# Patient Record
Sex: FEMALE | Race: WHITE | NOT HISPANIC OR LATINO | Employment: OTHER | URBAN - METROPOLITAN AREA
[De-identification: names, ages, dates, MRNs, and addresses within clinical notes are randomized per-mention and may not be internally consistent; named-entity substitution may affect disease eponyms.]

---

## 2010-05-12 LAB — CRC RECOMMENDATION EXT: NORMAL

## 2017-01-09 ENCOUNTER — HISTORICAL (OUTPATIENT)
Dept: RADIOLOGY | Facility: HOSPITAL | Age: 72
End: 2017-01-09

## 2017-02-13 ENCOUNTER — HISTORICAL (OUTPATIENT)
Dept: LAB | Facility: HOSPITAL | Age: 72
End: 2017-02-13

## 2017-02-22 ENCOUNTER — HISTORICAL (OUTPATIENT)
Dept: LAB | Facility: HOSPITAL | Age: 72
End: 2017-02-22

## 2017-11-28 ENCOUNTER — HISTORICAL (OUTPATIENT)
Dept: LAB | Facility: HOSPITAL | Age: 72
End: 2017-11-28

## 2017-11-28 LAB
ALBUMIN SERPL-MCNC: 3.9 GM/DL (ref 3.4–5)
ALBUMIN/GLOB SERPL: 1.7 RATIO (ref 1.1–2)
ALP SERPL-CCNC: 99 UNIT/L (ref 46–116)
ALT SERPL-CCNC: 48 UNIT/L (ref 12–78)
APPEARANCE, UA: CLEAR
AST SERPL-CCNC: 28 UNIT/L (ref 15–37)
BACTERIA SPEC CULT: ABNORMAL
BILIRUB SERPL-MCNC: 0.8 MG/DL (ref 0.2–1)
BILIRUB UR QL STRIP: NEGATIVE
BILIRUBIN DIRECT+TOT PNL SERPL-MCNC: 0.18 MG/DL (ref 0–0.2)
BILIRUBIN DIRECT+TOT PNL SERPL-MCNC: 0.62 MG/DL (ref 0–0.8)
BUN SERPL-MCNC: 22.3 MG/DL (ref 7–18)
CALCIUM SERPL-MCNC: 9.7 MG/DL (ref 8.5–10.1)
CHLORIDE SERPL-SCNC: 104 MMOL/L (ref 98–107)
CHOLEST SERPL-MCNC: 204 MG/DL (ref 0–200)
CHOLEST/HDLC SERPL: 3.9 {RATIO} (ref 0–4)
CO2 SERPL-SCNC: 29.7 MMOL/L (ref 21–32)
COLOR UR: YELLOW
CREAT SERPL-MCNC: 0.8 MG/DL (ref 0.6–1.3)
CREAT UR-MCNC: 60 MG/DL (ref 30–125)
EST. AVERAGE GLUCOSE BLD GHB EST-MCNC: 123 MG/DL
GLOBULIN SER-MCNC: 2.3 GM/DL (ref 2.4–3.5)
GLUCOSE (UA): NEGATIVE
GLUCOSE SERPL-MCNC: 102 MG/DL (ref 74–106)
HBA1C MFR BLD: 5.9 % (ref 4.5–6.2)
HDLC SERPL-MCNC: 52 MG/DL (ref 40–60)
HGB UR QL STRIP: NEGATIVE
KETONES UR QL STRIP: NEGATIVE
LDLC SERPL CALC-MCNC: 129 MG/DL (ref 0–129)
LEUKOCYTE ESTERASE UR QL STRIP: ABNORMAL
MICROALBUMIN UR-MCNC: 1.4 MG/DL (ref 0–3)
MICROALBUMIN/CREAT RATIO PNL UR: 23.4 MG/GM CR (ref 0–30)
NITRITE UR QL STRIP: NEGATIVE
PH UR STRIP: 7 [PH] (ref 5–9)
POTASSIUM SERPL-SCNC: 4.6 MMOL/L (ref 3.5–5.1)
PROT SERPL-MCNC: 6.2 GM/DL (ref 6.4–8.2)
PROT UR QL STRIP: NEGATIVE
RBC #/AREA URNS HPF: ABNORMAL /[HPF]
SODIUM SERPL-SCNC: 141 MMOL/L (ref 136–145)
SP GR UR STRIP: 1.01 (ref 1–1.03)
SQUAMOUS EPITHELIAL, UA: ABNORMAL
TRIGL SERPL-MCNC: 115 MG/DL
TSH SERPL-ACNC: 1.62 MIU/ML (ref 0.36–3.74)
UROBILINOGEN UR STRIP-ACNC: 0.2
VLDLC SERPL CALC-MCNC: 23 MG/DL
WBC #/AREA URNS HPF: ABNORMAL /HPF

## 2018-04-27 ENCOUNTER — HISTORICAL (OUTPATIENT)
Dept: RADIOLOGY | Facility: HOSPITAL | Age: 73
End: 2018-04-27

## 2018-05-01 ENCOUNTER — HISTORICAL (OUTPATIENT)
Dept: LAB | Facility: HOSPITAL | Age: 73
End: 2018-05-01

## 2018-07-25 ENCOUNTER — HISTORICAL (OUTPATIENT)
Dept: LAB | Facility: HOSPITAL | Age: 73
End: 2018-07-25

## 2018-07-25 LAB
ALBUMIN SERPL-MCNC: 3.8 GM/DL (ref 3.4–5)
ALBUMIN/GLOB SERPL: 1.5 RATIO (ref 1.1–2)
ALP SERPL-CCNC: 96 UNIT/L (ref 46–116)
ALT SERPL-CCNC: 37 UNIT/L (ref 12–78)
AST SERPL-CCNC: 24 UNIT/L (ref 15–37)
BILIRUB SERPL-MCNC: 1.1 MG/DL (ref 0.2–1)
BILIRUBIN DIRECT+TOT PNL SERPL-MCNC: 0.22 MG/DL (ref 0–0.2)
BILIRUBIN DIRECT+TOT PNL SERPL-MCNC: 0.88 MG/DL (ref 0–0.8)
BUN SERPL-MCNC: 15 MG/DL (ref 7–18)
CALCIUM SERPL-MCNC: 9.6 MG/DL (ref 8.5–10.1)
CHLORIDE SERPL-SCNC: 102 MMOL/L (ref 98–107)
CO2 SERPL-SCNC: 28.5 MMOL/L (ref 21–32)
CREAT SERPL-MCNC: 0.65 MG/DL (ref 0.6–1.3)
EST. AVERAGE GLUCOSE BLD GHB EST-MCNC: 126 MG/DL
GLOBULIN SER-MCNC: 2.6 GM/DL (ref 2.4–3.5)
GLUCOSE SERPL-MCNC: 101 MG/DL (ref 74–106)
HBA1C MFR BLD: 6 % (ref 4.5–6.2)
POTASSIUM SERPL-SCNC: 4.8 MMOL/L (ref 3.5–5.1)
PROT SERPL-MCNC: 6.4 GM/DL (ref 6.4–8.2)
SODIUM SERPL-SCNC: 138 MMOL/L (ref 136–145)

## 2018-08-06 ENCOUNTER — HISTORICAL (OUTPATIENT)
Dept: RADIOLOGY | Facility: HOSPITAL | Age: 73
End: 2018-08-06

## 2018-08-29 ENCOUNTER — HISTORICAL (OUTPATIENT)
Dept: RADIOLOGY | Facility: HOSPITAL | Age: 73
End: 2018-08-29

## 2018-10-15 ENCOUNTER — HISTORICAL (OUTPATIENT)
Dept: RADIOLOGY | Facility: HOSPITAL | Age: 73
End: 2018-10-15

## 2018-10-23 ENCOUNTER — HISTORICAL (OUTPATIENT)
Dept: RADIOLOGY | Facility: HOSPITAL | Age: 73
End: 2018-10-23

## 2018-11-19 ENCOUNTER — HISTORICAL (OUTPATIENT)
Dept: LAB | Facility: HOSPITAL | Age: 73
End: 2018-11-19

## 2018-11-19 LAB
ABS NEUT (OLG): 4.05 X10(3)/MCL (ref 2.1–9.2)
ALBUMIN SERPL-MCNC: 4 GM/DL (ref 3.4–5)
ALBUMIN/GLOB SERPL: 1.5 RATIO (ref 1.1–2)
ALP SERPL-CCNC: 104 UNIT/L (ref 46–116)
ALT SERPL-CCNC: 49 UNIT/L (ref 12–78)
AST SERPL-CCNC: 32 UNIT/L (ref 15–37)
BASOPHILS # BLD AUTO: 0.1 X10(3)/MCL (ref 0–0.2)
BASOPHILS NFR BLD AUTO: 1 %
BILIRUB SERPL-MCNC: 0.8 MG/DL (ref 0.2–1)
BILIRUBIN DIRECT+TOT PNL SERPL-MCNC: 0.19 MG/DL (ref 0–0.2)
BILIRUBIN DIRECT+TOT PNL SERPL-MCNC: 0.61 MG/DL (ref 0–0.8)
BUN SERPL-MCNC: 18.7 MG/DL (ref 7–18)
CALCIUM SERPL-MCNC: 10.2 MG/DL (ref 8.5–10.1)
CHLORIDE SERPL-SCNC: 101 MMOL/L (ref 98–107)
CHOLEST SERPL-MCNC: 219 MG/DL (ref 0–200)
CHOLEST/HDLC SERPL: 3.5 {RATIO} (ref 0–4)
CO2 SERPL-SCNC: 32.4 MMOL/L (ref 21–32)
CREAT SERPL-MCNC: 0.65 MG/DL (ref 0.6–1.3)
EOSINOPHIL # BLD AUTO: 0.2 X10(3)/MCL (ref 0–0.9)
EOSINOPHIL NFR BLD AUTO: 3 %
ERYTHROCYTE [DISTWIDTH] IN BLOOD BY AUTOMATED COUNT: 12.9 % (ref 11.5–17)
EST. AVERAGE GLUCOSE BLD GHB EST-MCNC: 123 MG/DL
GLOBULIN SER-MCNC: 2.7 GM/DL (ref 2.4–3.5)
GLUCOSE SERPL-MCNC: 111 MG/DL (ref 74–106)
HBA1C MFR BLD: 5.9 % (ref 4.5–6.2)
HCT VFR BLD AUTO: 41.6 % (ref 37–47)
HDLC SERPL-MCNC: 62 MG/DL (ref 40–60)
HGB BLD-MCNC: 13.5 GM/DL (ref 12–16)
IMM GRANULOCYTES # BLD AUTO: 0.01 % (ref 0–0.02)
IMM GRANULOCYTES NFR BLD AUTO: 0.1 % (ref 0–0.43)
LDLC SERPL CALC-MCNC: 143 MG/DL (ref 0–129)
LYMPHOCYTES # BLD AUTO: 2.9 X10(3)/MCL (ref 0.6–4.6)
LYMPHOCYTES NFR BLD AUTO: 37 %
MCH RBC QN AUTO: 29.3 PG (ref 27–31)
MCHC RBC AUTO-ENTMCNC: 32.5 GM/DL (ref 33–36)
MCV RBC AUTO: 90.4 FL (ref 80–94)
MONOCYTES # BLD AUTO: 0.6 X10(3)/MCL (ref 0.1–1.3)
MONOCYTES NFR BLD AUTO: 7 %
NEUTROPHILS # BLD AUTO: 4.05 X10(3)/MCL (ref 1.4–7.9)
NEUTROPHILS NFR BLD AUTO: 52 %
PLATELET # BLD AUTO: 246 X10(3)/MCL (ref 130–400)
PMV BLD AUTO: 10.4 FL (ref 9.4–12.4)
POTASSIUM SERPL-SCNC: 4.6 MMOL/L (ref 3.5–5.1)
PROT SERPL-MCNC: 6.7 GM/DL (ref 6.4–8.2)
RBC # BLD AUTO: 4.6 X10(6)/MCL (ref 4.2–5.4)
SODIUM SERPL-SCNC: 140 MMOL/L (ref 136–145)
TRIGL SERPL-MCNC: 69 MG/DL
VLDLC SERPL CALC-MCNC: 14 MG/DL
WBC # SPEC AUTO: 7.8 X10(3)/MCL (ref 4.5–11.5)

## 2018-11-20 ENCOUNTER — HISTORICAL (OUTPATIENT)
Dept: LAB | Facility: HOSPITAL | Age: 73
End: 2018-11-20

## 2018-11-20 LAB
APPEARANCE, UA: CLEAR
BACTERIA SPEC CULT: ABNORMAL
BILIRUB UR QL STRIP: NEGATIVE
COLOR UR: YELLOW
GLUCOSE (UA): NEGATIVE
HGB UR QL STRIP: NEGATIVE
KETONES UR QL STRIP: NEGATIVE
LEUKOCYTE ESTERASE UR QL STRIP: ABNORMAL
NITRITE UR QL STRIP: NEGATIVE
PH UR STRIP: 7 [PH] (ref 5–9)
PROT UR QL STRIP: NEGATIVE
RBC #/AREA URNS HPF: ABNORMAL /HPF
SP GR UR STRIP: 1.01 (ref 1–1.03)
SQUAMOUS EPITHELIAL, UA: ABNORMAL
UROBILINOGEN UR STRIP-ACNC: 0.2
WBC #/AREA URNS HPF: ABNORMAL /HPF

## 2018-11-22 LAB — FINAL CULTURE: NO GROWTH

## 2018-12-10 ENCOUNTER — HISTORICAL (OUTPATIENT)
Dept: LAB | Facility: HOSPITAL | Age: 73
End: 2018-12-10

## 2018-12-10 LAB
CALCIUM SERPL-MCNC: 9.6 MG/DL (ref 8.5–10.1)
CREAT UR-MCNC: 51.9 MG/DL (ref 30–125)
MICROALBUMIN UR-MCNC: 0.9 MG/DL (ref 0–3)
MICROALBUMIN/CREAT RATIO PNL UR: 17.3 MG/GM CR (ref 0–30)
PHOSPHATE SERPL-MCNC: 3.1 MG/DL (ref 2.5–4.9)
TSH SERPL-ACNC: 0.6 MIU/ML (ref 0.36–3.74)

## 2019-01-16 ENCOUNTER — HISTORICAL (OUTPATIENT)
Dept: RADIOLOGY | Facility: HOSPITAL | Age: 74
End: 2019-01-16

## 2019-01-30 ENCOUNTER — HISTORICAL (OUTPATIENT)
Dept: RADIOLOGY | Facility: HOSPITAL | Age: 74
End: 2019-01-30

## 2019-01-30 LAB
ABS NEUT (OLG): 3.8 X10(3)/MCL (ref 2.1–9.2)
BASOPHILS # BLD AUTO: 0 X10(3)/MCL (ref 0–0.2)
BASOPHILS NFR BLD AUTO: 0 %
BUN SERPL-MCNC: 21.6 MG/DL (ref 7–18)
CALCIUM SERPL-MCNC: 10.1 MG/DL (ref 8.5–10.1)
CHLORIDE SERPL-SCNC: 102 MMOL/L (ref 98–107)
CO2 SERPL-SCNC: 33.7 MMOL/L (ref 21–32)
CREAT SERPL-MCNC: 0.68 MG/DL (ref 0.6–1.3)
CREAT/UREA NIT SERPL: 32
EOSINOPHIL # BLD AUTO: 0.1 X10(3)/MCL (ref 0–0.9)
EOSINOPHIL NFR BLD AUTO: 2 %
ERYTHROCYTE [DISTWIDTH] IN BLOOD BY AUTOMATED COUNT: 13 % (ref 11.5–17)
GLUCOSE SERPL-MCNC: 112 MG/DL (ref 74–106)
HCT VFR BLD AUTO: 42.9 % (ref 37–47)
HGB BLD-MCNC: 13.8 GM/DL (ref 12–16)
LYMPHOCYTES # BLD AUTO: 2.2 X10(3)/MCL (ref 0.6–4.6)
LYMPHOCYTES NFR BLD AUTO: 33 %
MCH RBC QN AUTO: 28.9 PG (ref 27–31)
MCHC RBC AUTO-ENTMCNC: 32.2 GM/DL (ref 33–36)
MCV RBC AUTO: 89.9 FL (ref 80–94)
MONOCYTES # BLD AUTO: 0.5 X10(3)/MCL (ref 0.1–1.3)
MONOCYTES NFR BLD AUTO: 8 %
NEUTROPHILS # BLD AUTO: 3.8 X10(3)/MCL (ref 1.4–7.9)
NEUTROPHILS NFR BLD AUTO: 57 %
PLATELET # BLD AUTO: 263 X10(3)/MCL (ref 130–400)
PMV BLD AUTO: 10.2 FL (ref 9.4–12.4)
POTASSIUM SERPL-SCNC: 4.5 MMOL/L (ref 3.5–5.1)
RBC # BLD AUTO: 4.77 X10(6)/MCL (ref 4.2–5.4)
SODIUM SERPL-SCNC: 140 MMOL/L (ref 136–145)
WBC # SPEC AUTO: 6.7 X10(3)/MCL (ref 4.5–11.5)

## 2019-02-01 ENCOUNTER — HISTORICAL (OUTPATIENT)
Dept: SURGERY | Facility: HOSPITAL | Age: 74
End: 2019-02-01

## 2019-02-20 ENCOUNTER — HISTORICAL (OUTPATIENT)
Dept: RADIOLOGY | Facility: HOSPITAL | Age: 74
End: 2019-02-20

## 2019-02-26 ENCOUNTER — HISTORICAL (OUTPATIENT)
Dept: LAB | Facility: HOSPITAL | Age: 74
End: 2019-02-26

## 2019-02-26 LAB
EST. AVERAGE GLUCOSE BLD GHB EST-MCNC: 117 MG/DL
HBA1C MFR BLD: 5.7 % (ref 4.5–6.2)

## 2019-03-13 ENCOUNTER — HISTORICAL (OUTPATIENT)
Dept: RADIOLOGY | Facility: HOSPITAL | Age: 74
End: 2019-03-13

## 2019-04-24 ENCOUNTER — HISTORICAL (OUTPATIENT)
Dept: RADIOLOGY | Facility: HOSPITAL | Age: 74
End: 2019-04-24

## 2019-08-21 ENCOUNTER — HISTORICAL (OUTPATIENT)
Dept: LAB | Facility: HOSPITAL | Age: 74
End: 2019-08-21

## 2019-08-21 LAB
CREAT UR-MCNC: 38.2 MG/DL (ref 30–125)
MICROALBUMIN UR-MCNC: 0.6 MG/DL (ref 0–3)
MICROALBUMIN/CREAT RATIO PNL UR: 15.7 MG/GM CR (ref 0–30)

## 2019-09-30 ENCOUNTER — HISTORICAL (OUTPATIENT)
Dept: LAB | Facility: HOSPITAL | Age: 74
End: 2019-09-30

## 2019-09-30 LAB
ABS NEUT (OLG): 3.1 X10(3)/MCL (ref 2.1–9.2)
ALBUMIN SERPL-MCNC: 3.9 GM/DL (ref 3.4–5)
ALBUMIN SERPL-MCNC: 3.9 GM/DL (ref 3.4–5)
ALBUMIN/GLOB SERPL: 1.6 RATIO (ref 1.1–2)
ALBUMIN/GLOB SERPL: 1.6 RATIO (ref 1.1–2)
ALP SERPL-CCNC: 107 UNIT/L (ref 46–116)
ALP SERPL-CCNC: 107 UNIT/L (ref 46–116)
ALT SERPL-CCNC: 49 UNIT/L (ref 12–78)
ALT SERPL-CCNC: 49 UNIT/L (ref 12–78)
APPEARANCE, UA: ABNORMAL
APPEARANCE, UA: CLEAR
AST SERPL-CCNC: 36 UNIT/L (ref 15–37)
AST SERPL-CCNC: 36 UNIT/L (ref 15–37)
BACTERIA SPEC CULT: ABNORMAL
BACTERIA SPEC CULT: ABNORMAL
BASOPHILS # BLD AUTO: 0 X10(3)/MCL (ref 0–0.2)
BASOPHILS NFR BLD AUTO: 0 %
BILIRUB SERPL-MCNC: 0.8 MG/DL (ref 0.2–1)
BILIRUB SERPL-MCNC: 0.9 MG/DL (ref 0.2–1)
BILIRUB UR QL STRIP: NEGATIVE
BILIRUB UR QL STRIP: NEGATIVE
BILIRUBIN DIRECT+TOT PNL SERPL-MCNC: 0.2 MG/DL (ref 0–0.2)
BILIRUBIN DIRECT+TOT PNL SERPL-MCNC: 0.21 MG/DL (ref 0–0.2)
BILIRUBIN DIRECT+TOT PNL SERPL-MCNC: 0.6 MG/DL (ref 0–0.8)
BILIRUBIN DIRECT+TOT PNL SERPL-MCNC: 0.69 MG/DL (ref 0–0.8)
BUN SERPL-MCNC: 19.3 MG/DL (ref 7–18)
BUN SERPL-MCNC: 19.3 MG/DL (ref 7–18)
CALCIUM SERPL-MCNC: 10 MG/DL (ref 8.5–10.1)
CALCIUM SERPL-MCNC: 10 MG/DL (ref 8.5–10.1)
CHLORIDE SERPL-SCNC: 105 MMOL/L (ref 98–107)
CHLORIDE SERPL-SCNC: 105 MMOL/L (ref 98–107)
CHOLEST SERPL-MCNC: 201 MG/DL (ref 0–200)
CHOLEST SERPL-MCNC: 201 MG/DL (ref 0–200)
CHOLEST/HDLC SERPL: 3.5 {RATIO} (ref 0–4)
CHOLEST/HDLC SERPL: 3.6 {RATIO} (ref 0–4)
CO2 SERPL-SCNC: 31.5 MMOL/L (ref 21–32)
CO2 SERPL-SCNC: 31.5 MMOL/L (ref 21–32)
COLOR UR: YELLOW
COLOR UR: YELLOW
CREAT SERPL-MCNC: 0.5 MG/DL (ref 0.6–1.3)
CREAT SERPL-MCNC: 0.58 MG/DL (ref 0.6–1.3)
DEPRECATED CALCIDIOL+CALCIFEROL SERPL-MC: 45.23 NG/DL (ref 30–80)
EOSINOPHIL # BLD AUTO: 0.1 X10(3)/MCL (ref 0–0.9)
EOSINOPHIL NFR BLD AUTO: 2 %
ERYTHROCYTE [DISTWIDTH] IN BLOOD BY AUTOMATED COUNT: 13 % (ref 11.5–17)
EST. AVERAGE GLUCOSE BLD GHB EST-MCNC: 120 MG/DL
EST. AVERAGE GLUCOSE BLD GHB EST-MCNC: 120 MG/DL
GLOBULIN SER-MCNC: 2.5 GM/DL (ref 2.4–3.5)
GLOBULIN SER-MCNC: 2.5 GM/DL (ref 2.4–3.5)
GLUCOSE (UA): NEGATIVE
GLUCOSE (UA): NEGATIVE
GLUCOSE SERPL-MCNC: 106 MG/DL (ref 74–106)
GLUCOSE SERPL-MCNC: 106 MG/DL (ref 74–106)
HBA1C MFR BLD: 5.8 % (ref 4.5–6.2)
HBA1C MFR BLD: 5.8 % (ref 4.5–6.2)
HCT VFR BLD AUTO: 40.5 % (ref 37–47)
HDLC SERPL-MCNC: 56 MG/DL (ref 40–60)
HDLC SERPL-MCNC: 57 MG/DL (ref 40–60)
HGB BLD-MCNC: 13 GM/DL (ref 12–16)
HGB UR QL STRIP: NEGATIVE
HGB UR QL STRIP: NEGATIVE
KETONES UR QL STRIP: NEGATIVE
KETONES UR QL STRIP: NEGATIVE
LDLC SERPL CALC-MCNC: 132 MG/DL (ref 0–129)
LDLC SERPL CALC-MCNC: 133 MG/DL (ref 0–129)
LEUKOCYTE ESTERASE UR QL STRIP: NEGATIVE
LEUKOCYTE ESTERASE UR QL STRIP: NEGATIVE
LYMPHOCYTES # BLD AUTO: 2.6 X10(3)/MCL (ref 0.6–4.6)
LYMPHOCYTES NFR BLD AUTO: 41 %
MCH RBC QN AUTO: 29.2 PG (ref 27–31)
MCHC RBC AUTO-ENTMCNC: 32.1 GM/DL (ref 33–36)
MCV RBC AUTO: 91 FL (ref 80–94)
MONOCYTES # BLD AUTO: 0.4 X10(3)/MCL (ref 0.1–1.3)
MONOCYTES NFR BLD AUTO: 7 %
NEUTROPHILS # BLD AUTO: 3.1 X10(3)/MCL (ref 1.4–7.9)
NEUTROPHILS NFR BLD AUTO: 48 %
NITRITE UR QL STRIP: NEGATIVE
NITRITE UR QL STRIP: NEGATIVE
PH UR STRIP: 6 [PH] (ref 5–9)
PH UR STRIP: 6 [PH] (ref 5–9)
PLATELET # BLD AUTO: 216 X10(3)/MCL (ref 130–400)
PMV BLD AUTO: 10 FL (ref 9.4–12.4)
POTASSIUM SERPL-SCNC: 4.2 MMOL/L (ref 3.5–5.1)
POTASSIUM SERPL-SCNC: 4.3 MMOL/L (ref 3.5–5.1)
PROT SERPL-MCNC: 6.4 GM/DL (ref 6.4–8.2)
PROT SERPL-MCNC: 6.4 GM/DL (ref 6.4–8.2)
PROT UR QL STRIP: NEGATIVE
PROT UR QL STRIP: NEGATIVE
RBC # BLD AUTO: 4.45 X10(6)/MCL (ref 4.2–5.4)
RBC #/AREA URNS HPF: ABNORMAL /[HPF]
RBC #/AREA URNS HPF: ABNORMAL /[HPF]
SODIUM SERPL-SCNC: 142 MMOL/L (ref 136–145)
SODIUM SERPL-SCNC: 142 MMOL/L (ref 136–145)
SP GR UR STRIP: 1.02 (ref 1–1.03)
SP GR UR STRIP: 1.02 (ref 1–1.03)
SQUAMOUS EPITHELIAL, UA: ABNORMAL
SQUAMOUS EPITHELIAL, UA: ABNORMAL
TRIGL SERPL-MCNC: 62 MG/DL
TRIGL SERPL-MCNC: 62 MG/DL
TSH SERPL-ACNC: 1.3 MIU/ML (ref 0.36–3.74)
UROBILINOGEN UR STRIP-ACNC: 1
UROBILINOGEN UR STRIP-ACNC: 1
VLDLC SERPL CALC-MCNC: 12 MG/DL
VLDLC SERPL CALC-MCNC: 12 MG/DL
WBC # SPEC AUTO: 6.5 X10(3)/MCL (ref 4.5–11.5)
WBC #/AREA URNS HPF: ABNORMAL /[HPF]
WBC #/AREA URNS HPF: ABNORMAL /[HPF]

## 2020-03-05 ENCOUNTER — HISTORICAL (OUTPATIENT)
Dept: RADIOLOGY | Facility: HOSPITAL | Age: 75
End: 2020-03-05

## 2020-05-04 ENCOUNTER — HISTORICAL (OUTPATIENT)
Dept: LAB | Facility: HOSPITAL | Age: 75
End: 2020-05-04

## 2020-05-04 LAB
ALBUMIN SERPL-MCNC: 3.8 GM/DL (ref 3.4–5)
ALBUMIN/GLOB SERPL: 1.7 RATIO (ref 1.1–2)
ALP SERPL-CCNC: 95 UNIT/L (ref 46–116)
ALT SERPL-CCNC: 60 UNIT/L (ref 12–78)
APPEARANCE, UA: CLEAR
AST SERPL-CCNC: 39 UNIT/L (ref 15–37)
BACTERIA SPEC CULT: ABNORMAL
BILIRUB SERPL-MCNC: 1 MG/DL (ref 0.2–1)
BILIRUB UR QL STRIP: NEGATIVE
BILIRUBIN DIRECT+TOT PNL SERPL-MCNC: 0.27 MG/DL (ref 0–0.2)
BILIRUBIN DIRECT+TOT PNL SERPL-MCNC: 0.73 MG/DL (ref 0–0.8)
BUN SERPL-MCNC: 19 MG/DL (ref 7–18)
CALCIUM SERPL-MCNC: 9.5 MG/DL (ref 8.5–10.1)
CHLORIDE SERPL-SCNC: 104 MMOL/L (ref 98–107)
CHOLEST SERPL-MCNC: 215 MG/DL (ref 0–200)
CHOLEST/HDLC SERPL: 3.5 {RATIO} (ref 0–4)
CO2 SERPL-SCNC: 28.7 MMOL/L (ref 21–32)
COLOR UR: YELLOW
CREAT SERPL-MCNC: 0.71 MG/DL (ref 0.6–1.3)
EST. AVERAGE GLUCOSE BLD GHB EST-MCNC: 114 MG/DL
GLOBULIN SER-MCNC: 2.3 GM/DL (ref 2.4–3.5)
GLUCOSE (UA): NEGATIVE
GLUCOSE SERPL-MCNC: 107 MG/DL (ref 74–106)
HBA1C MFR BLD: 5.6 % (ref 4.5–6.2)
HDLC SERPL-MCNC: 62 MG/DL (ref 40–60)
HGB UR QL STRIP: NEGATIVE
KETONES UR QL STRIP: NEGATIVE
LDLC SERPL CALC-MCNC: 139 MG/DL (ref 0–129)
LEUKOCYTE ESTERASE UR QL STRIP: ABNORMAL
NITRITE UR QL STRIP: NEGATIVE
PH UR STRIP: 7 [PH] (ref 5–9)
POTASSIUM SERPL-SCNC: 4.1 MMOL/L (ref 3.5–5.1)
PROT SERPL-MCNC: 6.1 GM/DL (ref 6.4–8.2)
PROT UR QL STRIP: NEGATIVE
RBC #/AREA URNS HPF: ABNORMAL /[HPF]
SODIUM SERPL-SCNC: 139 MMOL/L (ref 136–145)
SP GR UR STRIP: 1.01 (ref 1–1.03)
SQUAMOUS EPITHELIAL, UA: ABNORMAL
TRIGL SERPL-MCNC: 68 MG/DL
UROBILINOGEN UR STRIP-ACNC: 0.2
VLDLC SERPL CALC-MCNC: 14 MG/DL
WBC #/AREA URNS HPF: ABNORMAL /HPF

## 2020-06-12 ENCOUNTER — HISTORICAL (OUTPATIENT)
Dept: RADIOLOGY | Facility: HOSPITAL | Age: 75
End: 2020-06-12

## 2020-07-16 ENCOUNTER — HISTORICAL (OUTPATIENT)
Dept: RADIOLOGY | Facility: HOSPITAL | Age: 75
End: 2020-07-16

## 2020-11-02 ENCOUNTER — HISTORICAL (OUTPATIENT)
Dept: LAB | Facility: HOSPITAL | Age: 75
End: 2020-11-02

## 2020-11-02 ENCOUNTER — HISTORICAL (OUTPATIENT)
Dept: INFUSION THERAPY | Facility: HOSPITAL | Age: 75
End: 2020-11-02

## 2020-11-02 LAB
ABS NEUT (OLG): 4.27 X10(3)/MCL (ref 2.1–9.2)
ALBUMIN SERPL-MCNC: 4.13 GM/DL (ref 3.4–5)
ALBUMIN/GLOB SERPL: 1.6 RATIO (ref 1.1–2)
ALP SERPL-CCNC: 111 UNIT/L (ref 46–116)
ALT SERPL-CCNC: 56 UNIT/L (ref 12–78)
APPEARANCE, UA: ABNORMAL
AST SERPL-CCNC: 38 UNIT/L (ref 15–37)
BACTERIA SPEC CULT: ABNORMAL
BASOPHILS # BLD AUTO: 0 X10(3)/MCL (ref 0–0.2)
BASOPHILS NFR BLD AUTO: 0 %
BILIRUB SERPL-MCNC: 0.7 MG/DL (ref 0.2–1)
BILIRUB UR QL STRIP: NEGATIVE
BILIRUBIN DIRECT+TOT PNL SERPL-MCNC: 0.2 MG/DL (ref 0–0.2)
BILIRUBIN DIRECT+TOT PNL SERPL-MCNC: 0.5 MG/DL (ref 0–0.8)
BUN SERPL-MCNC: 20.5 MG/DL (ref 7–18)
CALCIUM SERPL-MCNC: 9.9 MG/DL (ref 8.5–10.1)
CHLORIDE SERPL-SCNC: 105 MMOL/L (ref 98–107)
CHOLEST SERPL-MCNC: 227 MG/DL (ref 0–200)
CHOLEST/HDLC SERPL: 3.5 {RATIO} (ref 0–4)
CO2 SERPL-SCNC: 30.1 MMOL/L (ref 21–32)
COLOR UR: YELLOW
CREAT SERPL-MCNC: 0.76 MG/DL (ref 0.6–1.3)
CREAT UR-MCNC: 64.2 MG/DL (ref 45–106)
DEPRECATED CALCIDIOL+CALCIFEROL SERPL-MC: 53.5 NG/ML (ref 6.6–49.9)
EOSINOPHIL # BLD AUTO: 0.1 X10(3)/MCL (ref 0–0.9)
EOSINOPHIL NFR BLD AUTO: 2 %
ERYTHROCYTE [DISTWIDTH] IN BLOOD BY AUTOMATED COUNT: 12.8 % (ref 11.5–17)
EST. AVERAGE GLUCOSE BLD GHB EST-MCNC: 123 MG/DL
GLOBULIN SER-MCNC: 2.57 GM/DL (ref 2.4–3.5)
GLUCOSE (UA): NEGATIVE
GLUCOSE SERPL-MCNC: 134 MG/DL (ref 74–106)
HBA1C MFR BLD: 5.9 % (ref 4.5–6.2)
HCT VFR BLD AUTO: 44.1 % (ref 37–47)
HDLC SERPL-MCNC: 65 MG/DL (ref 40–60)
HGB BLD-MCNC: 13.8 GM/DL (ref 12–16)
HGB UR QL STRIP: NEGATIVE
IMM GRANULOCYTES # BLD AUTO: 0.02 % (ref 0–0.02)
IMM GRANULOCYTES NFR BLD AUTO: 0.3 % (ref 0–0.43)
KETONES UR QL STRIP: NEGATIVE
LDLC SERPL CALC-MCNC: 147 MG/DL (ref 0–129)
LEUKOCYTE ESTERASE UR QL STRIP: ABNORMAL
LYMPHOCYTES # BLD AUTO: 2.7 X10(3)/MCL (ref 0.6–4.6)
LYMPHOCYTES NFR BLD AUTO: 35 %
MCH RBC QN AUTO: 29.4 PG (ref 27–31)
MCHC RBC AUTO-ENTMCNC: 31.3 GM/DL (ref 33–36)
MCV RBC AUTO: 93.8 FL (ref 80–94)
MICROALBUMIN UR-MCNC: 6.5 UG/ML
MICROALBUMIN/CREAT RATIO PNL UR: 10.1 MG/GM CR (ref 0–30)
MONOCYTES # BLD AUTO: 0.5 X10(3)/MCL (ref 0.1–1.3)
MONOCYTES NFR BLD AUTO: 6 %
NEUTROPHILS # BLD AUTO: 4.27 X10(3)/MCL (ref 1.4–7.9)
NEUTROPHILS NFR BLD AUTO: 56 %
NITRITE UR QL STRIP: NEGATIVE
PH UR STRIP: 8.5 [PH] (ref 5–9)
PLATELET # BLD AUTO: 231 X10(3)/MCL (ref 130–400)
PMV BLD AUTO: 9.6 FL (ref 9.4–12.4)
POTASSIUM SERPL-SCNC: 4.2 MMOL/L (ref 3.5–5.1)
PROT SERPL-MCNC: 6.7 GM/DL (ref 6.4–8.2)
PROT UR QL STRIP: NEGATIVE
RBC # BLD AUTO: 4.7 X10(6)/MCL (ref 4.2–5.4)
RBC #/AREA URNS HPF: ABNORMAL /[HPF]
SODIUM SERPL-SCNC: 142 MMOL/L (ref 136–145)
SP GR UR STRIP: 1.02 (ref 1–1.03)
SQUAMOUS EPITHELIAL, UA: ABNORMAL
TRIGL SERPL-MCNC: 76 MG/DL
TSH SERPL-ACNC: 1.95 MIU/ML (ref 0.36–3.74)
UROBILINOGEN UR STRIP-ACNC: 1
VLDLC SERPL CALC-MCNC: 15 MG/DL
WBC # SPEC AUTO: 7.6 X10(3)/MCL (ref 4.5–11.5)
WBC #/AREA URNS HPF: ABNORMAL /HPF

## 2021-04-27 ENCOUNTER — HISTORICAL (OUTPATIENT)
Dept: RADIOLOGY | Facility: HOSPITAL | Age: 76
End: 2021-04-27

## 2021-06-03 ENCOUNTER — HISTORICAL (OUTPATIENT)
Dept: LAB | Facility: HOSPITAL | Age: 76
End: 2021-06-03

## 2021-06-03 LAB
ABS NEUT (OLG): 3.59 X10(3)/MCL (ref 2.1–9.2)
ALBUMIN SERPL-MCNC: 3.8 GM/DL (ref 3.4–4.8)
ALBUMIN/GLOB SERPL: 1.5 RATIO (ref 1.1–2)
ALP SERPL-CCNC: 101 UNIT/L (ref 40–150)
ALT SERPL-CCNC: 46 UNIT/L (ref 0–55)
AST SERPL-CCNC: 38 UNIT/L (ref 5–34)
BASOPHILS # BLD AUTO: 0 X10(3)/MCL (ref 0–0.2)
BASOPHILS NFR BLD AUTO: 0 %
BILIRUB SERPL-MCNC: 1.3 MG/DL
BILIRUBIN DIRECT+TOT PNL SERPL-MCNC: 0.4 MG/DL (ref 0–0.5)
BILIRUBIN DIRECT+TOT PNL SERPL-MCNC: 0.9 MG/DL (ref 0–0.8)
BUN SERPL-MCNC: 16.9 MG/DL (ref 9.8–20.1)
CALCIUM SERPL-MCNC: 9.7 MG/DL (ref 8.4–10.2)
CHLORIDE SERPL-SCNC: 104 MMOL/L (ref 98–107)
CHOLEST SERPL-MCNC: 189 MG/DL
CHOLEST/HDLC SERPL: 4 {RATIO} (ref 0–5)
CO2 SERPL-SCNC: 29 MMOL/L (ref 23–31)
CREAT SERPL-MCNC: 0.68 MG/DL (ref 0.55–1.02)
EOSINOPHIL # BLD AUTO: 0.2 X10(3)/MCL (ref 0–0.9)
EOSINOPHIL NFR BLD AUTO: 3 %
ERYTHROCYTE [DISTWIDTH] IN BLOOD BY AUTOMATED COUNT: 13.2 % (ref 11.5–17)
EST. AVERAGE GLUCOSE BLD GHB EST-MCNC: 114 MG/DL
GLOBULIN SER-MCNC: 2.5 GM/DL (ref 2.4–3.5)
GLUCOSE SERPL-MCNC: 109 MG/DL (ref 82–115)
HBA1C MFR BLD: 5.6 %
HCT VFR BLD AUTO: 42 % (ref 37–47)
HDLC SERPL-MCNC: 49 MG/DL (ref 35–60)
HGB BLD-MCNC: 13.2 GM/DL (ref 12–16)
IMM GRANULOCYTES # BLD AUTO: 0.01 % (ref 0–0.02)
IMM GRANULOCYTES NFR BLD AUTO: 0.1 % (ref 0–0.43)
LDLC SERPL CALC-MCNC: 123 MG/DL (ref 50–140)
LYMPHOCYTES # BLD AUTO: 2.4 X10(3)/MCL (ref 0.6–4.6)
LYMPHOCYTES NFR BLD AUTO: 36 %
MCH RBC QN AUTO: 28.8 PG (ref 27–31)
MCHC RBC AUTO-ENTMCNC: 31.4 GM/DL (ref 33–36)
MCV RBC AUTO: 91.5 FL (ref 80–94)
MONOCYTES # BLD AUTO: 0.5 X10(3)/MCL (ref 0.1–1.3)
MONOCYTES NFR BLD AUTO: 7 %
NEUTROPHILS # BLD AUTO: 3.59 X10(3)/MCL (ref 1.4–7.9)
NEUTROPHILS NFR BLD AUTO: 54 %
PLATELET # BLD AUTO: 208 X10(3)/MCL (ref 130–400)
PMV BLD AUTO: 9.8 FL (ref 9.4–12.4)
POTASSIUM SERPL-SCNC: 4.3 MMOL/L (ref 3.5–5.1)
PROT SERPL-MCNC: 6.3 GM/DL (ref 5.8–7.6)
RBC # BLD AUTO: 4.59 X10(6)/MCL (ref 4.2–5.4)
SODIUM SERPL-SCNC: 140 MMOL/L (ref 136–145)
TRIGL SERPL-MCNC: 86 MG/DL (ref 37–140)
VLDLC SERPL CALC-MCNC: 17 MG/DL
WBC # SPEC AUTO: 6.7 X10(3)/MCL (ref 4.5–11.5)

## 2021-09-30 ENCOUNTER — HISTORICAL (OUTPATIENT)
Dept: LAB | Facility: HOSPITAL | Age: 76
End: 2021-09-30

## 2021-09-30 LAB
ABS NEUT (OLG): 4.21 X10(3)/MCL (ref 2.1–9.2)
BASOPHILS # BLD AUTO: 0 X10(3)/MCL (ref 0–0.2)
BASOPHILS NFR BLD AUTO: 0 %
BUN SERPL-MCNC: 15.2 MG/DL (ref 9.8–20.1)
CALCIUM SERPL-MCNC: 10.1 MG/DL (ref 8.4–10.2)
CHLORIDE SERPL-SCNC: 104 MMOL/L (ref 98–107)
CO2 SERPL-SCNC: 26 MMOL/L (ref 23–31)
CREAT SERPL-MCNC: 0.68 MG/DL (ref 0.55–1.02)
CREAT/UREA NIT SERPL: 22
EOSINOPHIL # BLD AUTO: 0.2 X10(3)/MCL (ref 0–0.9)
EOSINOPHIL NFR BLD AUTO: 2 %
ERYTHROCYTE [DISTWIDTH] IN BLOOD BY AUTOMATED COUNT: 13.6 % (ref 11.5–17)
GLUCOSE SERPL-MCNC: 109 MG/DL (ref 82–115)
HCT VFR BLD AUTO: 37.5 % (ref 37–47)
HGB BLD-MCNC: 11.9 GM/DL (ref 12–16)
IMM GRANULOCYTES # BLD AUTO: 0.01 % (ref 0–0.02)
IMM GRANULOCYTES NFR BLD AUTO: 0.1 % (ref 0–0.43)
LYMPHOCYTES # BLD AUTO: 2.5 X10(3)/MCL (ref 0.6–4.6)
LYMPHOCYTES NFR BLD AUTO: 33 %
MCH RBC QN AUTO: 29.2 PG (ref 27–31)
MCHC RBC AUTO-ENTMCNC: 31.7 GM/DL (ref 33–36)
MCV RBC AUTO: 91.9 FL (ref 80–94)
MONOCYTES # BLD AUTO: 0.6 X10(3)/MCL (ref 0.1–1.3)
MONOCYTES NFR BLD AUTO: 8 %
NEUTROPHILS # BLD AUTO: 4.21 X10(3)/MCL (ref 1.4–7.9)
NEUTROPHILS NFR BLD AUTO: 56 %
PLATELET # BLD AUTO: 226 X10(3)/MCL (ref 130–400)
PMV BLD AUTO: 9.7 FL (ref 9.4–12.4)
POTASSIUM SERPL-SCNC: 3.9 MMOL/L (ref 3.5–5.1)
RBC # BLD AUTO: 4.08 X10(6)/MCL (ref 4.2–5.4)
SARS-COV-2 AG RESP QL IA.RAPID: NEGATIVE
SODIUM SERPL-SCNC: 140 MMOL/L (ref 136–145)
WBC # SPEC AUTO: 7.5 X10(3)/MCL (ref 4.5–11.5)

## 2021-10-01 ENCOUNTER — HISTORICAL (OUTPATIENT)
Dept: SURGERY | Facility: HOSPITAL | Age: 76
End: 2021-10-01

## 2021-10-13 ENCOUNTER — HISTORICAL (OUTPATIENT)
Dept: RADIOLOGY | Facility: HOSPITAL | Age: 76
End: 2021-10-13

## 2021-10-20 ENCOUNTER — HISTORICAL (OUTPATIENT)
Dept: RADIOLOGY | Facility: HOSPITAL | Age: 76
End: 2021-10-20

## 2021-11-10 ENCOUNTER — HISTORICAL (OUTPATIENT)
Dept: RADIOLOGY | Facility: HOSPITAL | Age: 76
End: 2021-11-10

## 2021-12-01 ENCOUNTER — HISTORICAL (OUTPATIENT)
Dept: RADIOLOGY | Facility: HOSPITAL | Age: 76
End: 2021-12-01

## 2022-01-11 ENCOUNTER — HISTORICAL (OUTPATIENT)
Dept: RADIOLOGY | Facility: HOSPITAL | Age: 77
End: 2022-01-11

## 2022-02-17 ENCOUNTER — HISTORICAL (OUTPATIENT)
Dept: ADMINISTRATIVE | Facility: HOSPITAL | Age: 77
End: 2022-02-17

## 2022-02-17 LAB
BUN SERPL-MCNC: 17.2 MG/DL (ref 9.8–20.1)
CALCIUM SERPL-MCNC: 10.2 MG/DL (ref 8.7–10.5)
CHLORIDE SERPL-SCNC: 102 MMOL/L (ref 98–107)
CO2 SERPL-SCNC: 25 MMOL/L (ref 23–31)
CREAT SERPL-MCNC: 0.61 MG/DL (ref 0.55–1.02)
CREAT/UREA NIT SERPL: 28
EST. AVERAGE GLUCOSE BLD GHB EST-MCNC: 114 MG/DL
GLUCOSE SERPL-MCNC: 101 MG/DL (ref 82–115)
HBA1C MFR BLD: 5.6 %
HEMOLYSIS INTERF INDEX SERPL-ACNC: 16
ICTERIC INTERF INDEX SERPL-ACNC: 1
LIPEMIC INTERF INDEX SERPL-ACNC: 3
POTASSIUM SERPL-SCNC: 4.2 MMOL/L (ref 3.5–5.1)
SODIUM SERPL-SCNC: 137 MMOL/L (ref 136–145)
TSH SERPL-ACNC: 2.36 M[IU]/L (ref 0.35–4.94)

## 2022-03-03 ENCOUNTER — HISTORICAL (OUTPATIENT)
Dept: LAB | Facility: HOSPITAL | Age: 77
End: 2022-03-03

## 2022-03-03 ENCOUNTER — HISTORICAL (OUTPATIENT)
Dept: ADMINISTRATIVE | Facility: HOSPITAL | Age: 77
End: 2022-03-03

## 2022-03-03 LAB
ABS NEUT (OLG): 9.62 (ref 2.1–9.2)
BASOPHILS # BLD AUTO: 0 10*3/UL (ref 0–0.2)
BASOPHILS NFR BLD AUTO: 0 %
CK SERPL-CCNC: 111 U/L (ref 29–168)
CRP SERPL-MCNC: 0 MG/L (ref 0–1)
ERYTHROCYTE [DISTWIDTH] IN BLOOD BY AUTOMATED COUNT: 13.7 % (ref 11.5–17)
ERYTHROCYTE [SEDIMENTATION RATE] IN BLOOD: 10 MM/H (ref 0–20)
FOLATE SERPL-MCNC: 16.6 NG/ML (ref 7–31.4)
HCT VFR BLD AUTO: 40.4 % (ref 37–47)
HGB BLD-MCNC: 12.5 G/DL (ref 12–16)
IMM GRANULOCYTES # BLD AUTO: 0.02 10*3/UL (ref 0–0.02)
IMM GRANULOCYTES NFR BLD AUTO: 0.2 % (ref 0–0.43)
LYMPHOCYTES # BLD AUTO: 2.4 10*3/UL (ref 0.6–4.6)
LYMPHOCYTES NFR BLD AUTO: 18 %
MANUAL DIFF? (OHS): NO
MCH RBC QN AUTO: 27.5 PG (ref 27–31)
MCHC RBC AUTO-ENTMCNC: 30.9 G/DL (ref 33–36)
MCV RBC AUTO: 89 FL (ref 80–94)
MONOCYTES # BLD AUTO: 0.9 10*3/UL (ref 0.1–1.3)
MONOCYTES NFR BLD AUTO: 7 %
NEUTROPHILS # BLD AUTO: 9.62 10*3/UL (ref 1.4–7.9)
NEUTROPHILS NFR BLD AUTO: 74 %
PLATELET # BLD AUTO: 287 10*3/UL (ref 130–400)
PMV BLD AUTO: 9.6 FL (ref 9.4–12.4)
RBC # BLD AUTO: 4.54 10*6/UL (ref 4.2–5.4)
T4 FREE SERPL-MCNC: 1.07 NG/DL (ref 0.7–1.48)
VIT B12 SERPL-MCNC: 540 PG/ML (ref 213–816)
WBC # SPEC AUTO: 12.9 10*3/UL (ref 4.5–11.5)

## 2022-03-07 LAB
ANTINUCLEAR ANTIBODY SCREEN (OHS): NEGATIVE
DSDNA AB QUANT (OHS): 2
DSDNA ANTIBODY (OHS): NEGATIVE

## 2022-03-17 ENCOUNTER — HISTORICAL (OUTPATIENT)
Dept: RADIOLOGY | Facility: HOSPITAL | Age: 77
End: 2022-03-17

## 2022-03-17 ENCOUNTER — HISTORICAL (OUTPATIENT)
Dept: ADMINISTRATIVE | Facility: HOSPITAL | Age: 77
End: 2022-03-17

## 2022-04-10 ENCOUNTER — HISTORICAL (OUTPATIENT)
Dept: ADMINISTRATIVE | Facility: HOSPITAL | Age: 77
End: 2022-04-10
Payer: MEDICARE

## 2022-04-26 VITALS
HEIGHT: 66 IN | DIASTOLIC BLOOD PRESSURE: 80 MMHG | BODY MASS INDEX: 34.37 KG/M2 | SYSTOLIC BLOOD PRESSURE: 140 MMHG | WEIGHT: 213.88 LBS | OXYGEN SATURATION: 93 %

## 2022-04-30 NOTE — OP NOTE
DATE OF SURGERY:    02/01/2019    SURGEON:  Haroon Monreal MD    PREOPERATIVE DIAGNOSIS:  Right ankle lateral malleolus fracture with syndesmosis injury.    POSTOPERATIVE DIAGNOSIS:  Right ankle lateral malleolus fracture with syndesmosis injury.    PROCEDURE:    1. Open reduction internal fixation, right lateral malleolus fracture.    2. Application of short-leg splint, right lower extremity.    ANESTHESIA:  LMA.    IV FLUIDS:  As per Anesthesia.    ESTIMATED BLOOD LOSS:  Less than 10 cc.    COUNTS:  Correct.    COMPLICATIONS:  None.    IMPLANTS:  Biomet 1/3 semi-tubular plate.    DISPOSITION:  Stable to PACU.    INDICATION FOR PROCEDURE:  Ms. Gomez is a 73-year-old female who has been followed in my clinic.  The patient was noted to have an unstable right ankle fracture.  The risks, benefits and alternatives were discussed with the patient in detail.  The risks included, but were not limited to, pain, bleeding, infection, damage to blood vessels or nerves, heart attack, stroke, death, need for operation, continued pain, continued loss of motion, refracture, malunion, nonunion, painful hardware, hardware failure, need for additional surgery, posttraumatic arthritis.  The patient understood these risks and wanted to proceed with surgical intervention.  Informed consent was obtained.    PROCEDURE IN DETAIL:  The patient was found in preoperative holding by Anesthesia and found fit for surgery.  She was taken to the operating room and placed on the operating table in supine position.  All bony prominences were well padded.  Timeout was called to identify correct patient, correct procedure and correct site, and all were in agreement.  The patient underwent LMA anesthesia without complications.  She was then prepped and draped in normal sterile fashion.  The right lower extremity was exposed for surgery.  Well padded tourniquet was placed on the right upper thigh.  Approximate tourniquet time was 25 minutes  at 300.  She received her preoperative antibiotics.  After exsanguination of the right lower extremity, tourniquet was inflated.  Attention was turned to the lateral aspect of the right ankle.  With the use of big C-arm multiple views, correct starting point was then made.  A 10 cm incision was made over the lateral aspect of the right ankle.  Soft tissue dissection down to the fracture site where it was easily identified.  Careful not to damage the neurovascular tendinous structures.  Next, the fracture site was then curetted of all soft tissue debris.  It was then anatomically reduced, confirmed on multiple views of the big C-arm with point to point clamps.  Next, a lag screw was placed across the main fracture line, lagging the 2 main fracture fragments together.  After this was done, an 8-hole, 1/3 semi-tubular plate was then chosen.  Three cortical screws were placed proximal to the fracture.  Four cancellous screws were placed distal to the fracture in a neutralization plate technique.  After this was done, multiple views in the C-arm found the fracture well reduced and the hardware was in appropriate position.  Her ankle was then stressed and found to be stable without any signs of widening or instability.  After this was done multiple views with the C-arm found the fracture well reduced and the hardware was in appropriate position.  She was stable to stressing.  Tourniquet was released.  Hemostasis achieved.  Copious irrigation was used to wash the wound.  Subcutaneous tissues closed with 3-0 Vicryl.  Skin was closed with 3-0 nylon sutures in standard interrupted fashion.  Xeroform, 4 x 4, soft tissue dressing, and a well-padded posterior splint was placed on the right lower extremity.  She was awoken from anesthesia and brought to PACU in stable condition.        ______________________________  MD REECE Gustafson/[facility]  DD:  02/05/2019  Time:  06:47AM  DT:  02/05/2019  Time:  07:01AM  Job #:  [id number]

## 2022-05-09 PROBLEM — E78.2 MIXED HYPERLIPIDEMIA: Status: ACTIVE | Noted: 2022-05-09

## 2022-05-09 PROBLEM — E11.9 TYPE 2 DIABETES MELLITUS: Status: ACTIVE | Noted: 2022-05-09

## 2022-05-09 PROBLEM — K64.4 EXTERNAL HEMORRHOIDS: Status: ACTIVE | Noted: 2022-05-09

## 2022-05-09 PROBLEM — G64 DISORDER OF PERIPHERAL NERVOUS SYSTEM: Status: ACTIVE | Noted: 2022-05-09

## 2022-05-09 PROBLEM — M19.90 OSTEOARTHRITIS: Status: ACTIVE | Noted: 2022-05-09

## 2022-05-09 PROBLEM — I83.90 VARICOSITIES OF LEG: Status: ACTIVE | Noted: 2022-05-09

## 2022-05-09 PROBLEM — K57.30 DIVERTICULOSIS OF COLON: Status: ACTIVE | Noted: 2022-05-09

## 2022-05-09 PROBLEM — J32.9 SINUSITIS: Status: ACTIVE | Noted: 2022-05-09

## 2022-05-09 PROBLEM — G43.909 MIGRAINE HEADACHE: Status: ACTIVE | Noted: 2022-05-09

## 2022-05-09 PROBLEM — M51.36 DEGENERATION OF INTERVERTEBRAL DISC OF LUMBAR REGION: Status: ACTIVE | Noted: 2022-05-09

## 2022-05-09 PROBLEM — I10 HYPERTENSION: Status: ACTIVE | Noted: 2022-05-09

## 2022-05-09 PROBLEM — S82.409A FRACTURE OF FIBULA: Status: ACTIVE | Noted: 2022-05-09

## 2022-05-09 PROBLEM — M51.369 DEGENERATION OF INTERVERTEBRAL DISC OF LUMBAR REGION: Status: ACTIVE | Noted: 2022-05-09

## 2022-05-09 PROBLEM — E03.9 HYPOTHYROIDISM: Status: ACTIVE | Noted: 2022-05-09

## 2022-05-09 PROBLEM — E55.9 VITAMIN D DEFICIENCY: Status: ACTIVE | Noted: 2022-05-09

## 2022-05-09 PROBLEM — H35.30 MACULAR DEGENERATION OF LEFT EYE: Status: ACTIVE | Noted: 2022-05-09

## 2022-06-20 NOTE — PROGRESS NOTES
Latanya Taylor MD   [address]  [place] [address]     PATIENT NAME: Kari Gomez  : 1945  DATE: 22  MRN: [id number]      Billing Provider: Latanya Taylor MD  Level of Service:   Patient PCP Information     Provider PCP Type    Latanya Taylor MD General          Reason for Visit / Chief Complaint: Follow-up (C/O CONSTIPATION AND BACK PAIN)       Update PCP  Update Chief Complaint         History of Present Illness / Problem Focused Workflow     Kari Gomez presents to the clinic with Follow-up (C/O CONSTIPATION AND BACK PAIN)     Here today with back pain, she did treatment for injection with 4 shots the first time. She was sedated, she did well for 2 weeks. She did not get the sedation and her BP went really high after the second injection. The third treatment was radiofrequency ablation and she was sedated. She went to a  after it and was there sitting for several hours, she was hurting by the time she got out. It's the left side that is hurting and she's constipated now. She had a friend that was immobile and she had constipation get into her upper abdomen. She takes Miralax or Colace, sometime MOM so she gets some movements. She then gets diarrhea. When she gets up it is 8-9/10 pain on the back. She goes to bed at 10 usually and goes to restroom around midnight and it starts hurting again. If she wakes again at 2 she is unable to return to bed with the pain. She heats and ices it but still can't get back to sleep. She knows she is not walking as much with her balance issues and using the boot has her in more trouble. She needs the other shoe matched to it and she can't walk with it or she'd fall.  She is not doing her back exercise as she is scared to do them. She does the exercise for her plantar fascia.       Review of Systems     Review of Systems   Constitutional: Positive for fatigue.        Interrupted sleep with the pain   Respiratory: Negative.    Cardiovascular:  Positive for leg swelling. Negative for chest pain and palpitations.        Left leg   Gastrointestinal: Positive for abdominal pain and constipation.   Genitourinary: Positive for urgency.   Neurological:        Left leg burning especially in morning. She is having cramps in lower legs. She has never tried the gabapentin   Psychiatric/Behavioral:        Does have symptoms of depression, she notes that she looked at the handout and realizes she has multiple.        Medical / Social / Family History     Past Medical History:   Diagnosis Date    Allergic sinusitis     Arthritis     Combined hyperlipidemia     Disc degeneration, lumbar     Diverticula of colon     DJD (degenerative joint disease)     External hemorrhoid     Fracture, fibula     Headache, chronic migraine without aura     HTN (hypertension)     Hypothyroidism     Macular degeneration     Neuropathy     Vitamin D deficiency        Past Surgical History:   Procedure Laterality Date    BREAST BIOPSY      BUNIONECTOMY      CATARACT EXTRACTION, BILATERAL      EYE SURGERY      MACULAR DEGENERATION    OPEN REDUCTION AND INTERNAL FIXATION (ORIF) OF FRACTURE OF DISTAL RADIUS  10/01/2021    OPEN REDUCTION AND INTERNAL FIXATION (ORIF) OF INJURY OF ANKLE Right 02/01/2019       Social History  Ms. Gomez      reports that she has never smoked. She has never used smokeless tobacco. She reports that she does not drink alcohol and does not use drugs.    Family History  Ms.'ernesto Gomez   family history includes Breast cancer in her maternal grandmother; COPD in her mother; Hyperlipidemia in her father and mother; Lung cancer in her mother; Stroke in her father.    Medications and Allergies     Medications  Outpatient Medications Marked as Taking for the 6/21/22 encounter (Office Visit) with Latanya Taylor MD   Medication Sig Dispense Refill    acetaminophen (TYLENOL ARTHRITIS PAIN ORAL) Take by mouth daily as needed.      alendronate  (FOSAMAX) 70 MG tablet Take 70 mg by mouth every 7 days.      benzonatate (TESSALON) 200 MG capsule daily as needed.      calcium carbonate/vitamin D3 (VITAMIN D-3 ORAL) Take by mouth.      CALCIUM-MAGNESIUM ORAL Take by mouth once daily at 6am.      cyanocobalamin, vitamin B-12, (VITAMIN B-12 ORAL) Take by mouth once daily at 6am.      diltiaZEM (CARDIZEM CD) 240 MG 24 hr capsule TAKE ONE CAPSULE BY MOUTH DAILY 30 capsule 5    docusate sodium (COLACE) 100 MG capsule Take 100 mg by mouth 2 (two) times daily.      DULoxetine (CYMBALTA) 30 MG capsule Take 30 mg by mouth once daily.      ECHINACEA 1X ORAL Take by mouth once daily at 6am.      hydroCHLOROthiazide (HYDRODIURIL) 12.5 MG Tab Take 12.5 mg by mouth daily as needed.      ketorolac (TORADOL) 10 mg tablet Take 10 mg by mouth every 4 (four) hours as needed.      levothyroxine (SYNTHROID) 125 MCG tablet Take 125 mcg by mouth once daily.      LYSINE ORAL Take by mouth once daily at 6am.      montelukast (SINGULAIR) 10 mg tablet Take 10 mg by mouth once daily.      multivit/folic acid/vit K1 (ONE-A-DAY WOMEN'S 50 PLUS ORAL) Take by mouth 2 (two) times a day.      polyethylene glycol 3350 (MIRALAX ORAL) Take by mouth.      RED YEAST RICE ORAL Take by mouth 2 (two) times a day.      sumatriptan (IMITREX) 100 MG tablet TAKE ONE TABLET BY MOUTH DAILY AS NEEDED FOR MIGRAINE HEADACHE (MAY REPEAT DOSE AFTER 2 HOURS UP TO A MAXIMUM  MG IN 24 HOURS) 9 tablet 3    vit A/vit C/vit E/zinc/copper (PRESERVISION AREDS ORAL) PreserVision AREDS         Allergies  Review of patient's allergies indicates:   Allergen Reactions    Meperidine      Other reaction(s): doctor said pt is allergic       Physical Examination     Vitals:    06/21/22 0917   BP: (!) 149/85   Pulse: 71   Resp: 16   Temp: 98.4 °F (36.9 °C)     Physical Exam  Vitals reviewed.   Constitutional:       Appearance: Normal appearance. She is obese.   HENT:      Head: Normocephalic.    Cardiovascular:      Rate and Rhythm: Normal rate and regular rhythm.   Pulmonary:      Effort: Pulmonary effort is normal.      Breath sounds: Normal breath sounds. No wheezing or rhonchi.   Abdominal:      Palpations: Abdomen is soft.      Comments: Tender to the upper abdomen with deep palpation   Musculoskeletal:      Cervical back: No tenderness.      Comments: Pain with rotation and flexion in lower lumbar   Skin:     General: Skin is warm and dry.      Coloration: Skin is not jaundiced.      Findings: No erythema.   Neurological:      Mental Status: She is alert.      Comments: SLR on the right at 15 degrees into back and thigh, at 30 degrees on the left into thigh.            Assessment and Plan (including Health Maintenance)      Problem List  Smart Sets  Document Outside HM   :  No visits with results within 3 Month(s) from this visit.   Latest known visit with results is:   Historical on 2022   Component Date Value    WBC 2022 12.9     RBC 2022 4.54     Hgb 2022 12.5     Hct 2022 40.4     MCV 2022 89.0     MCH 2022 27.5     MCHC 2022 30.9     RDW 2022 13.7     Platelet 2022 287     MPV 2022 9.6     Abs Neut 2022 9.62     Manual Diff? 2022 No     Neut % 2022 74     Lymph % 2022 18     Mono % 2022 7     Basophil % 2022 0     Lymph # 2022 2.4     Neut # 2022 9.62     Mono # 2022 0.9     Baso # 2022 0.0     IG# 2022 0.0200     IG% 2022 0.200     Creatine Kinase 2022 111     C-Reactive Protein 2022 0     Sed Rate 2022 10     Thyroxine Free 2022 1.07     Vitamin B12 Level 2022 540     Folate Level 2022 16.6     ANNA Screen 2022 Negative     DSDNA Antibody 2022 Negative     DSDNA Ab # 2022 2.0          Plan:     Lumbar spinal stenosis flare after sitting at  I want to get her to PT to  try to get her moving again. I will add gabapentin but at her age it will likely be difficult to tolerate, start low and go up slowly.       Health Maintenance Due   Topic Date Due    Hepatitis C Screening  Never done    TETANUS VACCINE  Never done    Pneumococcal Vaccines (Age 65+) (2 - PCV) 07/27/2016    Shingles Vaccine (2 of 3) 02/13/2017    COVID-19 Vaccine (3 - Booster for Pfizer series) 07/17/2021       Problem List Items Addressed This Visit        Ophtho    Macular degeneration of left eye       Cardiac/Vascular    Hypertension       Endocrine    Type 2 diabetes mellitus      Other Visit Diagnoses     Constipation, unspecified constipation type    -  Primary    Chronic bilateral low back pain, unspecified whether sciatica present        Relevant Orders    Ambulatory referral/consult to Physical/Occupational Therapy    Frail elderly        Degenerative lumbar spinal stenosis        Relevant Orders    Ambulatory referral/consult to Physical/Occupational Therapy          Health Maintenance Topics with due status: Not Due       Topic Last Completion Date    Influenza Vaccine 11/02/2020    Lipid Panel 06/03/2021    DEXA Scan 01/11/2022       Future Appointments   Date Time Provider Department Center   11/8/2022  9:20 AM Latanya Taylor MD [facility] [place] [facility]            Signature:  Latanya Taylor MD  [facility]  [address] [id number]    Date of encounter: 6/21/22

## 2022-06-21 ENCOUNTER — OFFICE VISIT (OUTPATIENT)
Dept: PRIMARY CARE CLINIC | Facility: CLINIC | Age: 77
End: 2022-06-21
Payer: MEDICARE

## 2022-06-21 VITALS
BODY MASS INDEX: 31.98 KG/M2 | RESPIRATION RATE: 16 BRPM | HEART RATE: 71 BPM | SYSTOLIC BLOOD PRESSURE: 149 MMHG | DIASTOLIC BLOOD PRESSURE: 85 MMHG | TEMPERATURE: 98 F | WEIGHT: 199 LBS | HEIGHT: 66 IN | OXYGEN SATURATION: 95 %

## 2022-06-21 DIAGNOSIS — K59.00 CONSTIPATION, UNSPECIFIED CONSTIPATION TYPE: Primary | ICD-10-CM

## 2022-06-21 DIAGNOSIS — E11.9 TYPE 2 DIABETES MELLITUS WITHOUT COMPLICATION, WITHOUT LONG-TERM CURRENT USE OF INSULIN: ICD-10-CM

## 2022-06-21 DIAGNOSIS — G89.29 CHRONIC BILATERAL LOW BACK PAIN, UNSPECIFIED WHETHER SCIATICA PRESENT: ICD-10-CM

## 2022-06-21 DIAGNOSIS — R54 FRAIL ELDERLY: ICD-10-CM

## 2022-06-21 DIAGNOSIS — M54.50 CHRONIC BILATERAL LOW BACK PAIN, UNSPECIFIED WHETHER SCIATICA PRESENT: ICD-10-CM

## 2022-06-21 DIAGNOSIS — I10 PRIMARY HYPERTENSION: ICD-10-CM

## 2022-06-21 DIAGNOSIS — M48.061 DEGENERATIVE LUMBAR SPINAL STENOSIS: ICD-10-CM

## 2022-06-21 DIAGNOSIS — H35.3220 EXUDATIVE AGE-RELATED MACULAR DEGENERATION OF LEFT EYE, UNSPECIFIED STAGE: ICD-10-CM

## 2022-06-21 PROBLEM — H60.549 ECZEMA OF EXTERNAL AUDITORY CANAL: Status: ACTIVE | Noted: 2020-12-08

## 2022-06-21 PROBLEM — J30.0 VASOMOTOR RHINITIS: Status: ACTIVE | Noted: 2020-12-08

## 2022-06-21 PROCEDURE — 99213 PR OFFICE/OUTPT VISIT, EST, LEVL III, 20-29 MIN: ICD-10-PCS | Mod: ,,, | Performed by: INTERNAL MEDICINE

## 2022-06-21 PROCEDURE — 99213 OFFICE O/P EST LOW 20 MIN: CPT | Mod: ,,, | Performed by: INTERNAL MEDICINE

## 2022-06-21 RX ORDER — BENZONATATE 200 MG/1
CAPSULE ORAL DAILY PRN
COMMUNITY
End: 2022-08-02

## 2022-06-21 RX ORDER — GABAPENTIN 100 MG/1
100 CAPSULE ORAL 3 TIMES DAILY
Qty: 90 CAPSULE | Refills: 2 | Status: SHIPPED | OUTPATIENT
Start: 2022-06-21 | End: 2023-09-26

## 2022-06-21 RX ORDER — DULOXETIN HYDROCHLORIDE 30 MG/1
30 CAPSULE, DELAYED RELEASE ORAL DAILY
COMMUNITY
Start: 2022-06-06 | End: 2022-11-03

## 2022-06-21 RX ORDER — DOCUSATE SODIUM 100 MG/1
100 CAPSULE, LIQUID FILLED ORAL 2 TIMES DAILY
COMMUNITY

## 2022-06-21 RX ORDER — HYDROCHLOROTHIAZIDE 12.5 MG/1
12.5 TABLET ORAL DAILY PRN
COMMUNITY
Start: 2022-05-31 | End: 2022-11-03

## 2022-06-21 RX ORDER — ALENDRONATE SODIUM 70 MG/1
70 TABLET ORAL
COMMUNITY
Start: 2022-05-23 | End: 2022-09-19

## 2022-06-21 RX ORDER — LEVOTHYROXINE SODIUM 125 UG/1
125 TABLET ORAL DAILY
COMMUNITY
Start: 2022-05-31 | End: 2023-01-25

## 2022-06-21 RX ORDER — MONTELUKAST SODIUM 10 MG/1
10 TABLET ORAL DAILY
COMMUNITY
Start: 2022-01-03 | End: 2023-04-18

## 2022-06-21 RX ORDER — KETOROLAC TROMETHAMINE 10 MG/1
10 TABLET, FILM COATED ORAL EVERY 4 HOURS PRN
COMMUNITY
Start: 2022-06-06 | End: 2022-12-22 | Stop reason: SDUPTHER

## 2022-06-21 RX ORDER — LACTULOSE 10 G/15ML
10 SOLUTION ORAL 3 TIMES DAILY
Qty: 1350 ML | Refills: 2 | Status: SHIPPED | OUTPATIENT
Start: 2022-06-21 | End: 2022-09-19

## 2022-06-22 RX ORDER — ACETAMINOPHEN 500 MG
4000 TABLET ORAL DAILY
COMMUNITY

## 2022-06-23 ENCOUNTER — TELEPHONE (OUTPATIENT)
Dept: PRIMARY CARE CLINIC | Facility: CLINIC | Age: 77
End: 2022-06-23
Payer: MEDICARE

## 2022-06-23 NOTE — TELEPHONE ENCOUNTER
----- Message from Lindsay Vallecillo sent at 6/23/2022  1:34 PM CDT -----  Regarding: Advice  Type:  Needs Medical Advice    Who Called: Patient  Best Call Back Number: [phone]  Additional Information: patient wants to see Anshul for her PT, she does not know location name only that it is in [facility]

## 2022-07-25 NOTE — PROGRESS NOTES
Latanya Taylor MD   [address] [facility] [place] [address]     PATIENT NAME: Kari Gomez  : 1945  DATE: 22  MRN: [id number]      Billing Provider: Latanya Taylor MD  Level of Service:   Patient PCP Information     Provider PCP Type    Latanya Taylor MD General          Reason for Visit / Chief Complaint: 6 months follow up  PT follow up      Update PCP  Update Chief Complaint         History of Present Illness / Problem Focused Workflow     Kari Gomez presents to the clinic with 6 months follow up     Here for follow up after physical therapy. She still has a lot of back pain but her shoulders and legs are feeling much better. She is trying to watch her diet,she does ok with sweets but bread is a weakness. She had bad news about a friend being in ICU. She has been trying to move. She is thinking about replacing her dog now, she has been visiting her friends with dogs but it was helping her stress.       Review of Systems     Review of Systems   Constitutional: Negative.    HENT: Negative.    Respiratory: Negative.    Cardiovascular: Negative.    Gastrointestinal: Negative.    Genitourinary: Negative.    Musculoskeletal: Positive for arthralgias and back pain.   Psychiatric/Behavioral: The patient is nervous/anxious.        Medical / Social / Family History     Past Medical History:   Diagnosis Date    Allergic sinusitis     Arthritis     Combined hyperlipidemia     Disc degeneration, lumbar     Diverticula of colon     DJD (degenerative joint disease)     External hemorrhoid     Fracture, fibula     Headache, chronic migraine without aura     HTN (hypertension)     Hypothyroidism     Macular degeneration     Neuropathy     Vitamin D deficiency        Past Surgical History:   Procedure Laterality Date    BREAST BIOPSY      BUNIONECTOMY      CATARACT EXTRACTION, BILATERAL      EYE SURGERY      MACULAR DEGENERATION    OPEN REDUCTION AND INTERNAL FIXATION (ORIF) OF FRACTURE OF  DISTAL RADIUS  10/01/2021    OPEN REDUCTION AND INTERNAL FIXATION (ORIF) OF INJURY OF ANKLE Right 02/01/2019       Social History  Ms. Gomez      reports that she has never smoked. She has never used smokeless tobacco. She reports that she does not drink alcohol and does not use drugs.    Family History  Ms.'s Gomez   family history includes Breast cancer in her maternal grandmother; COPD in her mother; Hyperlipidemia in her father and mother; Lung cancer in her mother; Stroke in her father.    Medications and Allergies     Medications  Outpatient Medications Marked as Taking for the 7/26/22 encounter (Office Visit) with Latanya Taylor MD   Medication Sig Dispense Refill    acetaminophen (TYLENOL ARTHRITIS PAIN ORAL) Take by mouth daily as needed.      alendronate (FOSAMAX) 70 MG tablet Take 70 mg by mouth every 7 days.      ascorbic acid (VITAMIN C ORAL) Take by mouth.      benzonatate (TESSALON) 200 MG capsule daily as needed.      calcium carbonate/vitamin D3 (VITAMIN D-3 ORAL) Take by mouth.      CALCIUM-MAGNESIUM ORAL Take by mouth once daily at 6am.      cholecalciferol, vitamin D3, (VITAMIN D3) 50 mcg (2,000 unit) Cap capsule Take 2,000 Units by mouth once daily.      cyanocobalamin, vitamin B-12, (VITAMIN B-12 ORAL) Take by mouth once daily at 6am.      diltiaZEM (CARDIZEM CD) 240 MG 24 hr capsule TAKE ONE CAPSULE BY MOUTH DAILY 30 capsule 5    docusate sodium (COLACE) 100 MG capsule Take 100 mg by mouth 2 (two) times daily.      DULoxetine (CYMBALTA) 30 MG capsule Take 30 mg by mouth once daily.      ECHINACEA 1X ORAL Take by mouth once daily at 6am.      ferrous fumarate/vit Bcomp,C (SUPER B COMPLEX ORAL) Take by mouth.      gabapentin (NEURONTIN) 100 MG capsule Take 1 capsule (100 mg total) by mouth 3 (three) times daily. 90 capsule 2    hydroCHLOROthiazide (HYDRODIURIL) 12.5 MG Tab Take 12.5 mg by mouth daily as needed.      ketorolac (TORADOL) 10 mg tablet Take 10 mg by mouth  every 4 (four) hours as needed.      lactulose (CHRONULAC) 20 gram/30 mL Soln Take 15 mLs (10 g total) by mouth 3 (three) times daily. 1350 mL 2    levothyroxine (SYNTHROID) 125 MCG tablet Take 125 mcg by mouth once daily.      LYSINE ORAL Take by mouth once daily at 6am.      magnesium salicylate (DOANS PILLS ORAL) Take by mouth.      montelukast (SINGULAIR) 10 mg tablet Take 10 mg by mouth once daily.      multivit/folic acid/vit K1 (ONE-A-DAY WOMEN'S 50 PLUS ORAL) Take by mouth 2 (two) times a day.      mv-mn/iron/folic acid/herb 190 (VITAMIN D3 COMPLETE ORAL) Vitamin D      polyethylene glycol 3350 (MIRALAX ORAL) Take by mouth.      RED YEAST RICE ORAL Take by mouth 2 (two) times a day.      sumatriptan (IMITREX) 100 MG tablet TAKE ONE TABLET BY MOUTH DAILY AS NEEDED FOR MIGRAINE HEADACHE (MAY REPEAT DOSE AFTER 2 HOURS UP TO A MAXIMUM  MG IN 24 HOURS) 9 tablet 3    vit A/vit C/vit E/zinc/copper (PRESERVISION AREDS ORAL) PreserVision AREDS         Allergies  Review of patient's allergies indicates:   Allergen Reactions    Meperidine      Other reaction(s): doctor said pt is allergic       Physical Examination     Vitals:    07/26/22 1105   BP: 127/68   Pulse: 65   Resp: 18   Temp: 98 °F (36.7 °C)     Physical Exam  Vitals reviewed.   Constitutional:       Appearance: She is obese.   HENT:      Head: Normocephalic.      Right Ear: Tympanic membrane normal.   Cardiovascular:      Rate and Rhythm: Normal rate and regular rhythm.   Pulmonary:      Effort: Pulmonary effort is normal.      Breath sounds: Wheezing present. No rhonchi.   Abdominal:      Palpations: Abdomen is soft.      Tenderness: There is no abdominal tenderness. There is no guarding.   Musculoskeletal:      Right lower leg: Edema present.      Left lower leg: Edema present.   Skin:     General: Skin is warm and dry.      Findings: No bruising.   Neurological:      Mental Status: She is alert and oriented to person, place, and time.  Mental status is at baseline.   Psychiatric:      Comments: Down talking about dogs, wants to look for an adult with training.            Assessment and Plan (including Health Maintenance)      Problem List  Smart Sets  Document Outside HM   :    Plan:   PT has improved mobility.  HTN doing well.  Lipid is due will check with A1C and her urine. DM has been excellent with diet.       Health Maintenance Due   Topic Date Due    Hepatitis C Screening  Never done    TETANUS VACCINE  Never done    Pneumococcal Vaccines (Age 65+) (2 - PCV) 07/27/2016    Shingles Vaccine (2 of 3) 02/13/2017    COVID-19 Vaccine (3 - Booster for Pfizer series) 07/17/2021       Problem List Items Addressed This Visit        Neuro    Degeneration of intervertebral disc of lumbar region - Primary       Cardiac/Vascular    Essential hypertension    Relevant Orders    Comprehensive Metabolic Panel    Lipid Panel    Urinalysis, Reflex to Urine Culture Urine, Clean Catch    Mixed hyperlipidemia    Relevant Orders    Lipid Panel       Endocrine    Hypothyroidism    Type 2 diabetes mellitus    Relevant Orders    Comprehensive Metabolic Panel    Hemoglobin A1C    Lipid Panel    Microalbumin/Creatinine Ratio, Urine    Urinalysis, Reflex to Urine Culture Urine, Clean Catch          Health Maintenance Topics with due status: Not Due       Topic Last Completion Date    Influenza Vaccine 11/02/2020    Lipid Panel 06/03/2021    DEXA Scan 01/11/2022       Future Appointments   Date Time Provider Department Center   11/8/2022  9:20 AM [facility] [place] [facility]            Signature:  Latanya Taylor MD  [facility]  [address] [id number]    Date of encounter: 7/26/22

## 2022-07-26 ENCOUNTER — OFFICE VISIT (OUTPATIENT)
Dept: PRIMARY CARE CLINIC | Facility: CLINIC | Age: 77
End: 2022-07-26
Payer: MEDICARE

## 2022-07-26 VITALS
HEIGHT: 66 IN | WEIGHT: 203.63 LBS | OXYGEN SATURATION: 95 % | HEART RATE: 65 BPM | BODY MASS INDEX: 32.72 KG/M2 | DIASTOLIC BLOOD PRESSURE: 68 MMHG | TEMPERATURE: 98 F | RESPIRATION RATE: 18 BRPM | SYSTOLIC BLOOD PRESSURE: 127 MMHG

## 2022-07-26 DIAGNOSIS — E03.9 HYPOTHYROIDISM, UNSPECIFIED TYPE: ICD-10-CM

## 2022-07-26 DIAGNOSIS — I10 ESSENTIAL HYPERTENSION: ICD-10-CM

## 2022-07-26 DIAGNOSIS — E78.2 MIXED HYPERLIPIDEMIA: ICD-10-CM

## 2022-07-26 DIAGNOSIS — E11.9 TYPE 2 DIABETES MELLITUS WITHOUT COMPLICATION, WITHOUT LONG-TERM CURRENT USE OF INSULIN: ICD-10-CM

## 2022-07-26 DIAGNOSIS — M51.36 DEGENERATION OF INTERVERTEBRAL DISC OF LUMBAR REGION: Primary | ICD-10-CM

## 2022-07-26 PROCEDURE — 99213 OFFICE O/P EST LOW 20 MIN: CPT | Mod: ,,, | Performed by: INTERNAL MEDICINE

## 2022-07-26 PROCEDURE — 99213 PR OFFICE/OUTPT VISIT, EST, LEVL III, 20-29 MIN: ICD-10-PCS | Mod: ,,, | Performed by: INTERNAL MEDICINE

## 2022-07-28 ENCOUNTER — LAB VISIT (OUTPATIENT)
Dept: LAB | Facility: HOSPITAL | Age: 77
End: 2022-07-28
Attending: INTERNAL MEDICINE
Payer: MEDICARE

## 2022-07-28 DIAGNOSIS — E78.2 MIXED HYPERLIPIDEMIA: ICD-10-CM

## 2022-07-28 DIAGNOSIS — E11.9 TYPE 2 DIABETES MELLITUS WITHOUT COMPLICATION, WITHOUT LONG-TERM CURRENT USE OF INSULIN: ICD-10-CM

## 2022-07-28 DIAGNOSIS — I10 ESSENTIAL HYPERTENSION: ICD-10-CM

## 2022-07-28 LAB
ALBUMIN SERPL-MCNC: 3.8 GM/DL (ref 3.4–4.8)
ALBUMIN/GLOB SERPL: 1.4 RATIO (ref 1.1–2)
ALP SERPL-CCNC: 113 UNIT/L (ref 40–150)
ALT SERPL-CCNC: 37 UNIT/L (ref 0–55)
AST SERPL-CCNC: 31 UNIT/L (ref 5–34)
BILIRUBIN DIRECT+TOT PNL SERPL-MCNC: 0.9 MG/DL
BUN SERPL-MCNC: 22.3 MG/DL (ref 9.8–20.1)
CALCIUM SERPL-MCNC: 10 MG/DL (ref 8.4–10.2)
CHLORIDE SERPL-SCNC: 103 MMOL/L (ref 98–107)
CHOLEST SERPL-MCNC: 196 MG/DL
CHOLEST/HDLC SERPL: 4 {RATIO} (ref 0–5)
CO2 SERPL-SCNC: 28 MMOL/L (ref 23–31)
CREAT SERPL-MCNC: 0.74 MG/DL (ref 0.55–1.02)
EST. AVERAGE GLUCOSE BLD GHB EST-MCNC: 114 MG/DL
GLOBULIN SER-MCNC: 2.7 GM/DL (ref 2.4–3.5)
GLUCOSE SERPL-MCNC: 116 MG/DL (ref 82–115)
HBA1C MFR BLD: 5.6 %
HDLC SERPL-MCNC: 55 MG/DL (ref 35–60)
LDLC SERPL CALC-MCNC: 127 MG/DL (ref 50–140)
POTASSIUM SERPL-SCNC: 4.2 MMOL/L (ref 3.5–5.1)
PROT SERPL-MCNC: 6.5 GM/DL (ref 5.8–7.6)
SODIUM SERPL-SCNC: 139 MMOL/L (ref 136–145)
TRIGL SERPL-MCNC: 68 MG/DL (ref 37–140)
VLDLC SERPL CALC-MCNC: 14 MG/DL

## 2022-07-28 PROCEDURE — 83036 HEMOGLOBIN GLYCOSYLATED A1C: CPT

## 2022-07-28 PROCEDURE — 36415 COLL VENOUS BLD VENIPUNCTURE: CPT

## 2022-07-28 PROCEDURE — 80061 LIPID PANEL: CPT

## 2022-07-28 PROCEDURE — 80053 COMPREHEN METABOLIC PANEL: CPT

## 2022-07-29 LAB
CREAT UR-MCNC: 66.3 MG/DL (ref 47–110)
MICROALBUMIN UR-MCNC: 5.5 UG/ML
MICROALBUMIN/CREAT RATIO PNL UR: 8.3 MG/GM CR (ref 0–30)

## 2022-08-01 ENCOUNTER — TELEPHONE (OUTPATIENT)
Dept: PRIMARY CARE CLINIC | Facility: CLINIC | Age: 77
End: 2022-08-01
Payer: MEDICARE

## 2022-08-01 NOTE — TELEPHONE ENCOUNTER
Pt stated that she just ordered a lot of the red yeast rice and would like to finish waht she just bought before being put on anything else. She will watch her diet and take her take the red yeast rice and discuss a med change at her next OV

## 2022-08-01 NOTE — TELEPHONE ENCOUNTER
----- Message from Latanya Taylor MD sent at 7/29/2022  1:18 PM CDT -----  Sugar is doing good. The A1C is lower than last time, under prediabetic and back in normal now. Her urine looked good. Her kidney looks a little dry. Don't forget to drink the water. The cholesterol isn't too bad but the LDL is above target, we'd like it under 90 and it's 127, that's better than last time but still up. It's probably time to go from the red yeast rice to a statin, if willing I'll send atorvastatin and we'll do LFT in a month.

## 2022-09-26 PROBLEM — M15.9 PRIMARY OSTEOARTHRITIS INVOLVING MULTIPLE JOINTS: Status: ACTIVE | Noted: 2022-05-09

## 2022-09-26 PROBLEM — M15.0 PRIMARY OSTEOARTHRITIS INVOLVING MULTIPLE JOINTS: Status: ACTIVE | Noted: 2022-05-09

## 2022-09-26 NOTE — PROGRESS NOTES
Latanya Taylor MD   [address] [facility] [place] [address]     PATIENT NAME: Kari Gomez  : 1945  DATE: 22  MRN: [id number]      Billing Provider: Latanya Taylor MD  Level of Service: DE OFFICE/OUTPT VISIT, EST, LEVL II, 10-19 MIN  Patient PCP Information       Provider PCP Type    Latanya Taylor MD General            Reason for Visit / Chief Complaint: No chief complaint on file. Rib pain after fall      Update PCP  Update Chief Complaint         History of Present Illness / Problem Focused Workflow     Kari Gomez presents to the clinic with No chief complaint on file.     Had fall in bathroom, she went down against the cabinet. No loss of conscious reported. She has been hurting over her chest since then. There has been no cough or SOB.       Review of Systems     Review of Systems   Constitutional:  Negative for chills and fatigue.   Respiratory:  Negative for cough.    Cardiovascular:  Positive for chest pain.   Musculoskeletal:  Positive for arthralgias.   Skin:         No bruising     Medical / Social / Family History     Past Medical History:   Diagnosis Date    Allergic sinusitis     Arthritis     Combined hyperlipidemia     Disc degeneration, lumbar     Diverticula of colon     DJD (degenerative joint disease)     External hemorrhoid     Fracture, fibula     Headache, chronic migraine without aura     HTN (hypertension)     Hypothyroidism     Macular degeneration     Neuropathy     Vitamin D deficiency        Past Surgical History:   Procedure Laterality Date    BREAST BIOPSY      BUNIONECTOMY      CATARACT EXTRACTION, BILATERAL      EYE SURGERY      MACULAR DEGENERATION    OPEN REDUCTION AND INTERNAL FIXATION (ORIF) OF FRACTURE OF DISTAL RADIUS  10/01/2021    OPEN REDUCTION AND INTERNAL FIXATION (ORIF) OF INJURY OF ANKLE Right 2019       Social History  Ms. Gomez      reports that she has never smoked. She has never used smokeless tobacco. She reports that she does not  drink alcohol and does not use drugs.    Family History  Ms.'s Gomez   family history includes Breast cancer in her maternal grandmother; COPD in her mother; Hyperlipidemia in her father and mother; Lung cancer in her mother; Stroke in her father.    Medications and Allergies     Medications  No outpatient medications have been marked as taking for the 9/27/22 encounter (Office Visit) with Latanya Taylor MD.       Allergies  Review of patient's allergies indicates:   Allergen Reactions    Meperidine      Other reaction(s): doctor said pt is allergic       Physical Examination   There were no vitals filed for this visit.  Physical Exam  Vitals reviewed.   Constitutional:       Appearance: Normal appearance.   Eyes:      Extraocular Movements: Extraocular movements intact.      Pupils: Pupils are equal, round, and reactive to light.   Cardiovascular:      Rate and Rhythm: Normal rate and regular rhythm.   Pulmonary:      Breath sounds: Normal breath sounds. No wheezing or rales.      Comments: Pain right lateral chest wall, no crepitance  Chest:      Chest wall: Tenderness present.   Abdominal:      General: Bowel sounds are normal.      Palpations: Abdomen is soft.   Musculoskeletal:      Cervical back: Normal range of motion. No rigidity.   Skin:     General: Skin is warm and dry.      Findings: No bruising.   Neurological:      Mental Status: She is alert.   Psychiatric:         Mood and Affect: Mood normal.         Behavior: Behavior normal.         Thought Content: Thought content normal.         Judgment: Judgment normal.         Assessment and Plan (including Health Maintenance)      Problem List  Smart Sets  Document Outside HM   :  No visits with results within 2 Month(s) from this visit.   Latest known visit with results is:   Lab Visit on 07/28/2022   Component Date Value    Color,  07/28/2022 Yellow     Appearance,  07/28/2022 Clear     Specific Gravity,  07/28/2022 1.015     pH,  07/28/2022  7.5     Protein, UA 07/28/2022 Negative     Glucose, UA 07/28/2022 Negative     Ketones, UA 07/28/2022 Negative     Blood, UA 07/28/2022 Negative     Bilirubin, UA 07/28/2022 Negative     Urobilinogen, UA 07/28/2022 0.2     Nitrites, UA 07/28/2022 Negative     Leukocyte Esterase, UA 07/28/2022 Trace (A)     Bacteria, UA 07/28/2022 None Seen     RBC, UA 07/28/2022 None Seen     WBC, UA 07/28/2022 None Seen     Squamous Epithelial Cell* 07/28/2022 Rare          Plan:   Fall, she has signs of chest contusion but nothing that suggests need for xray, will have her take her usual pain medication Toradol she has at home along with Tylenol as needed. If not improving I will add Tramadol. Discussed importance of splinting side and taking deep breaths to prevent pneumonia.       Health Maintenance Due   Topic Date Due    Hepatitis C Screening  Never done    Eye Exam  Never done    TETANUS VACCINE  Never done    Pneumococcal Vaccines (Age 65+) (2 - PCV) 07/27/2016    Shingles Vaccine (2 of 3) 02/13/2017    COVID-19 Vaccine (3 - Booster for Pfizer series) 04/14/2021    Influenza Vaccine (1) 09/01/2022       Problem List Items Addressed This Visit    None  Visit Diagnoses       Contusion of rib on right side, initial encounter    -  Primary    Accidental fall, initial encounter                Health Maintenance Topics with due status: Not Due       Topic Last Completion Date    DEXA Scan 01/11/2022    Diabetes Urine Screening 07/28/2022    Lipid Panel 07/28/2022    Hemoglobin A1c 07/28/2022       Future Appointments   Date Time Provider Department Center   12/20/2022 10:40 AM Latanya Taylor MD [facility] PCP   1/26/2023  8:40 AM Latanya Taylor MD [facility] PCP            Signature:  Latanya Taylor MD  [facility]  [address] [address]    Date of encounter: 9/27/22

## 2022-09-27 ENCOUNTER — OFFICE VISIT (OUTPATIENT)
Dept: PRIMARY CARE CLINIC | Facility: CLINIC | Age: 77
End: 2022-09-27
Payer: MEDICARE

## 2022-09-27 DIAGNOSIS — S20.211A CONTUSION OF RIB ON RIGHT SIDE, INITIAL ENCOUNTER: Primary | ICD-10-CM
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
## 2022-09-27 PROCEDURE — 99212 OFFICE O/P EST SF 10 MIN: CPT | Mod: ,,, | Performed by: INTERNAL MEDICINE

## 2022-09-27 PROCEDURE — 99212 PR OFFICE/OUTPT VISIT, EST, LEVL II, 10-19 MIN: ICD-10-PCS | Mod: ,,, | Performed by: INTERNAL MEDICINE

## 2022-09-28 ENCOUNTER — TELEPHONE (OUTPATIENT)
Dept: PRIMARY CARE CLINIC | Facility: CLINIC | Age: 77
End: 2022-09-28
Payer: MEDICARE

## 2022-09-28 RX ORDER — CEPHALEXIN 500 MG/1
500 CAPSULE ORAL EVERY 8 HOURS
Qty: 30 CAPSULE | Refills: 0 | Status: SHIPPED | OUTPATIENT
Start: 2022-09-28 | End: 2022-10-08

## 2022-10-03 ENCOUNTER — TELEPHONE (OUTPATIENT)
Dept: PRIMARY CARE CLINIC | Facility: CLINIC | Age: 77
End: 2022-10-03
Payer: MEDICARE

## 2022-10-03 DIAGNOSIS — R07.81 RIB PAIN: Primary | ICD-10-CM
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
## 2022-10-03 NOTE — TELEPHONE ENCOUNTER
It didn't  the phone message, she was calling about xray of chest and medication for mouth ulcers. I just sent the suspension on 9/30 for mouth, is she already out? If so we may need ENT to check her for other sources.  I put the xray orders in, when I saw her I didn't think it would make much difference in how we treat it but the order is there.

## 2022-10-03 NOTE — TELEPHONE ENCOUNTER
----- Message from Yuliya Zapata sent at 10/3/2022  9:43 AM CDT -----  Regarding: med adv  Type:  Needs Medical Advice    Who Called: patient   Symptoms (please be specific): mouth ulcers and xray request bc of short pain on side   How long has patient had these symptoms:  pain started today and mouth ulcers appeared after clinic visit.  Pharmacy name and phone #:  [facility] [phone]  Would the patient rather a call back or a response via [facility]?   Best Call Back Number: [phone]  Additional Information: Please call patient back.

## 2022-10-05 ENCOUNTER — TELEPHONE (OUTPATIENT)
Dept: PRIMARY CARE CLINIC | Facility: CLINIC | Age: 77
End: 2022-10-05
Payer: MEDICARE

## 2022-10-05 NOTE — TELEPHONE ENCOUNTER
----- Message from Ralph Mon sent at 10/5/2022  2:56 PM CDT -----  Type:  Patient Returning Call    Who Called: Dinorah  Who Left Message for Patient:florian  Does the patient know what this is regarding?:x ray  Would the patient rather a call back or a response via [facility]? Call back  Best Call Back Number: [phone]  Additional Information: Pt said Florian left a message regarding her x ray. Please call back pt thanks.

## 2022-10-06 ENCOUNTER — TELEPHONE (OUTPATIENT)
Dept: PRIMARY CARE CLINIC | Facility: CLINIC | Age: 77
End: 2022-10-06
Payer: MEDICARE

## 2022-10-06 DIAGNOSIS — K12.1 MOUTH ULCERS: Primary | ICD-10-CM

## 2022-10-06 DIAGNOSIS — K12.1 MOUTH ULCER: Primary | ICD-10-CM

## 2022-12-20 ENCOUNTER — OFFICE VISIT (OUTPATIENT)
Dept: PRIMARY CARE CLINIC | Facility: CLINIC | Age: 77
End: 2022-12-20
Payer: MEDICARE

## 2022-12-20 VITALS
OXYGEN SATURATION: 98 % | HEART RATE: 67 BPM | DIASTOLIC BLOOD PRESSURE: 78 MMHG | RESPIRATION RATE: 18 BRPM | HEIGHT: 66 IN | TEMPERATURE: 98 F | BODY MASS INDEX: 32.14 KG/M2 | WEIGHT: 200 LBS | SYSTOLIC BLOOD PRESSURE: 140 MMHG
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
## 2022-12-20 DIAGNOSIS — E11.9 CONTROLLED TYPE 2 DIABETES MELLITUS WITHOUT COMPLICATION, WITHOUT LONG-TERM CURRENT USE OF INSULIN: ICD-10-CM

## 2022-12-20 DIAGNOSIS — D72.829 LEUKOCYTOSIS, UNSPECIFIED TYPE: ICD-10-CM

## 2022-12-20 DIAGNOSIS — I10 ESSENTIAL HYPERTENSION: Primary | ICD-10-CM

## 2022-12-20 DIAGNOSIS — M15.9 PRIMARY OSTEOARTHRITIS INVOLVING MULTIPLE JOINTS: ICD-10-CM

## 2022-12-20 PROCEDURE — 99213 PR OFFICE/OUTPT VISIT, EST, LEVL III, 20-29 MIN: ICD-10-PCS | Mod: ,,, | Performed by: INTERNAL MEDICINE

## 2022-12-20 PROCEDURE — 99213 OFFICE O/P EST LOW 20 MIN: CPT | Mod: ,,, | Performed by: INTERNAL MEDICINE

## 2022-12-20 RX ORDER — CEPHALEXIN 500 MG/1
500 CAPSULE ORAL EVERY 8 HOURS
Qty: 30 CAPSULE | Refills: 0 | Status: SHIPPED | OUTPATIENT
Start: 2022-12-20 | End: 2022-12-30

## 2022-12-20 RX ORDER — IPRATROPIUM BROMIDE 42 UG/1
SPRAY, METERED NASAL
COMMUNITY
Start: 2022-10-11

## 2022-12-20 RX ORDER — PROMETHAZINE HYDROCHLORIDE AND DEXTROMETHORPHAN HYDROBROMIDE 6.25; 15 MG/5ML; MG/5ML
SYRUP ORAL
COMMUNITY
Start: 2022-10-11 | End: 2023-06-07 | Stop reason: SDUPTHER

## 2022-12-20 NOTE — PROGRESS NOTES
Latanya Taylor MD   [address] [address]     Patient ID: [id number]     Chief Complaint: 3m f/u, fall, Generalized Body Aches, and Headache        HPI:     Kari Gomez is a 77 y.o. female here today for a follow up. She fell at the [facility] and it was cold so she was rushing back to her car to warm up but she tripped on her jacqueline. She fell hitting her chin and right hand. She bit her lip. She was bleeding from mouth and nose. BP was over 200. The ambulance cleaned her up but she was walking ok so she didn't go to ER. She's been putting neosporin on the lip.       Subjective:     Review of Systems   HENT:          Pain in nose, still has clots, trying not to bleed.    Eyes:         Gets shot every time she goes.    Respiratory: Negative.     Cardiovascular:         Neg except as per HPI   Skin:         Had abrasion on the knees it's healed, the chin is healing, she is using neosporin.      ----------------------------  Allergic sinusitis  Arthritis  Combined hyperlipidemia  Disc degeneration, lumbar  Diverticula of colon  DJD (degenerative joint disease)  External hemorrhoid  Fracture, fibula  Headache, chronic migraine without aura  HTN (hypertension)  Hypothyroidism  Macular degeneration  Neuropathy  Vitamin D deficiency     Past Surgical History:   Procedure Laterality Date    BREAST BIOPSY      BUNIONECTOMY      CATARACT EXTRACTION, BILATERAL      EYE SURGERY      MACULAR DEGENERATION    FRACTURE SURGERY Left 2021    OPEN REDUCTION AND INTERNAL FIXATION (ORIF) OF FRACTURE OF DISTAL RADIUS  10/01/2021    OPEN REDUCTION AND INTERNAL FIXATION (ORIF) OF INJURY OF ANKLE Right 02/01/2019       Family History   Problem Relation Age of Onset    Hyperlipidemia Mother     Lung cancer Mother     COPD Mother     Stroke Father     Hyperlipidemia Father     Glaucoma Paternal Aunt     Breast cancer Maternal Grandmother         Social History     Socioeconomic History    Marital status: Single   Tobacco Use     Smoking status: Never    Smokeless tobacco: Never   Substance and Sexual Activity    Alcohol use: Never    Drug use: Never    Sexual activity: Not Currently       Review of patient's allergies indicates:   Allergen Reactions    Meperidine      Other reaction(s): doctor said pt is allergic  Other reaction(s): Not Indicated       Outpatient Medications Marked as Taking for the 22 encounter (Office Visit) with Latanya Taylor MD   Medication Sig Dispense Refill    acetaminophen (TYLENOL ARTHRITIS PAIN ORAL) Take by mouth daily as needed.      alendronate (FOSAMAX) 70 MG tablet TAKE ONE TABLET BY MOUTH ONCE A WEEK 4 tablet 4    ascorbic acid (VITAMIN C ORAL) Take by mouth.      benzonatate (TESSALON) 200 MG capsule TAKE ONE CAPSULE BY MOUTH THREE TIMES A DAY 30 capsule 5    calcium carbonate/vitamin D3 (VITAMIN D-3 ORAL) Take by mouth.      CALCIUM-MAGNESIUM ORAL Take by mouth once daily at 6am.      cholecalciferol, vitamin D3, (VITAMIN D3) 50 mcg (2,000 unit) Cap capsule Take 2,000 Units by mouth once daily.      cyanocobalamin, vitamin B-12, (VITAMIN B-12 ORAL) Take by mouth once daily at 6am.      diltiaZEM (CARDIZEM CD) 240 MG 24 hr capsule TAKE ONE CAPSULE BY MOUTH DAILY 30 capsule 5    docusate sodium (COLACE) 100 MG capsule Take 100 mg by mouth 2 (two) times daily.      DULoxetine (CYMBALTA) 30 MG capsule TAKE ONE CAPSULE BY MOUTH DAILY FOR NEUROPATHY AND MOOD DO NOT CRUSH OR CHEW 30 capsule 11    ECHINACEA 1X ORAL Take by mouth once daily at 6am.      ferrous fumarate/vit Bcomp,C (SUPER B COMPLEX ORAL) Take by mouth.      hydroCHLOROthiazide (HYDRODIURIL) 12.5 MG Tab TAKE ONE TABLET BY MOUTH DAILY 30 tablet 2    ipratropium (ATROVENT) 42 mcg (0.06 %) nasal spray SMARTSI Spray(s) By Mouth 3 Times Daily PRN      ketorolac (TORADOL) 10 mg tablet Take 10 mg by mouth every 4 (four) hours as needed.      levothyroxine (SYNTHROID) 125 MCG tablet Take 125 mcg by mouth once daily.      LYSINE ORAL Take by mouth  "once daily at 6am.      montelukast (SINGULAIR) 10 mg tablet Take 10 mg by mouth once daily.      multivit/folic acid/vit K1 (ONE-A-DAY WOMEN'S 50 PLUS ORAL) Take by mouth 2 (two) times a day.      mv-mn/iron/folic acid/herb 190 (VITAMIN D3 COMPLETE ORAL) Vitamin D      polyethylene glycol 3350 (MIRALAX ORAL) Take by mouth.      promethazine-dextromethorphan (PROMETHAZINE-DM) 6.25-15 mg/5 mL Syrp SMARTSI Milliliter(s) By Mouth Every 12 Hours PRN      RED YEAST RICE ORAL Take by mouth 2 (two) times a day.      sumatriptan (IMITREX) 100 MG tablet TAKE ONE TABLET BY MOUTH DAILY AS NEEDED FOR MIGRAINE HEADACHE (MAY REPEAT DOSE AFTER 2 HOURS UP TO A MAXIMUM  MG IN 24 HOURS) 9 tablet 3    UNABLE TO FIND Take 1 capsule by mouth once daily. medication name: K+2 Potassium      vit A/vit C/vit E/zinc/copper (PRESERVISION AREDS ORAL) PreserVision AREDS         Patient Care Team:  Latanya Taylor MD as PCP - General (Internal Medicine)  Marisol Griffin DPM as Consulting Physician (Podiatry)  João Cuenca MD as Consulting Physician (Otolaryngology)  Haroon Monreal MD as Consulting Physician (Orthopedic Surgery)  Keyon Hernandez MD as Consulting Physician (Ophthalmology)  Kelvin Matias MD as Consulting Physician (Otolaryngology)       Objective:     BP (!) 140/78 (BP Location: Left arm, Patient Position: Sitting, BP Method: Large (Automatic))   Pulse 67   Temp 98.1 °F (36.7 °C) (Oral)   Resp 18   Ht 5' 6" (1.676 m)   Wt 90.7 kg (200 lb)   SpO2 98%   BMI 32.28 kg/m²     Physical Exam  Vitals reviewed.   Constitutional:       Appearance: She is obese.   HENT:      Head: Normocephalic.      Mouth/Throat:      Comments: Abrasion on mouth looks ok, she has seen blisters. Inside the mouth looks like it's healing well  Eyes:      Extraocular Movements: Extraocular movements intact.      Pupils: Pupils are equal, round, and reactive to light.   Cardiovascular:      Rate and Rhythm: Normal rate and " regular rhythm.   Pulmonary:      Effort: Pulmonary effort is normal.      Breath sounds: Normal breath sounds.   Abdominal:      General: Abdomen is flat.      Palpations: Abdomen is soft.      Tenderness: There is no abdominal tenderness. There is no guarding.   Musculoskeletal:      Comments: Knees are moving ok.    Skin:     General: Skin is warm and dry.      Findings: Bruising present.      Comments: Does look like small bruises near eye. She has abrasion under lip onto lip. Small bruise on the right thumb.    Neurological:      Mental Status: She is alert.   Psychiatric:         Mood and Affect: Mood normal.         Behavior: Behavior normal.         Thought Content: Thought content normal.         Judgment: Judgment normal.         Assessment/Problems:       ICD-10-CM ICD-9-CM   1. Essential hypertension  I10 401.9   2. Controlled type 2 diabetes mellitus without complication, without long-term current use of insulin  E11.9 250.00   3. Primary osteoarthritis involving multiple joints  M15.9 715.98   4. Accidental fall, initial encounter  W19.XXXA E888.9        Plan:     1. Essential hypertension  Comments:  Still elevated, will need to monitor at home, she hasn't been watching at home.     2. Controlled type 2 diabetes mellitus without complication, without long-term current use of insulin    3. Primary osteoarthritis involving multiple joints    4. Accidental fall, initial encounter  Comments:  Resolving    Other orders  -     cephALEXin (KEFLEX) 500 MG capsule; Take 1 capsule (500 mg total) by mouth every 8 (eight) hours. for 10 days  Dispense: 30 capsule; Refill: 0             Follow up in about 3 months (around 3/20/2023) for Medication Managment. In addition to their scheduled follow up, the patient has also been instructed to follow up on as needed basis.     Signature:  Latanya Taylor MD  [facility]  [address] [address]

## 2023-02-16 LAB
LEFT EYE DM RETINOPATHY: NORMAL
RIGHT EYE DM RETINOPATHY: NORMAL

## 2023-03-16 ENCOUNTER — LAB VISIT (OUTPATIENT)
Dept: LAB | Facility: HOSPITAL | Age: 78
End: 2023-03-16
Attending: INTERNAL MEDICINE
Payer: COMMERCIAL

## 2023-03-16 DIAGNOSIS — I10 ESSENTIAL HYPERTENSION: ICD-10-CM
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
## 2023-03-16 DIAGNOSIS — E11.9 CONTROLLED TYPE 2 DIABETES MELLITUS WITHOUT COMPLICATION, WITHOUT LONG-TERM CURRENT USE OF INSULIN: ICD-10-CM

## 2023-03-16 DIAGNOSIS — D72.829 LEUKOCYTOSIS, UNSPECIFIED TYPE: ICD-10-CM

## 2023-03-16 LAB
ANION GAP SERPL CALC-SCNC: 8 MEQ/L
BASOPHILS # BLD AUTO: 0.03 X10(3)/MCL (ref 0–0.2)
BASOPHILS NFR BLD AUTO: 0.4 %
BUN SERPL-MCNC: 16.5 MG/DL (ref 9.8–20.1)
CALCIUM SERPL-MCNC: 10 MG/DL (ref 8.4–10.2)
CHLORIDE SERPL-SCNC: 102 MMOL/L (ref 98–107)
CO2 SERPL-SCNC: 31 MMOL/L (ref 23–31)
CREAT SERPL-MCNC: 0.68 MG/DL (ref 0.55–1.02)
CREAT/UREA NIT SERPL: 24
EOSINOPHIL # BLD AUTO: 0.22 X10(3)/MCL (ref 0–0.9)
EOSINOPHIL NFR BLD AUTO: 3.1 %
ERYTHROCYTE [DISTWIDTH] IN BLOOD BY AUTOMATED COUNT: 13.2 % (ref 11.5–17)
EST. AVERAGE GLUCOSE BLD GHB EST-MCNC: 111.2 MG/DL
GFR SERPLBLD CREATININE-BSD FMLA CKD-EPI: >60 MLS/MIN/1.73/M2
GLUCOSE SERPL-MCNC: 119 MG/DL (ref 82–115)
HBA1C MFR BLD: 5.5 %
HCT VFR BLD AUTO: 39.2 % (ref 37–47)
HGB BLD-MCNC: 12.5 G/DL (ref 12–16)
IMM GRANULOCYTES # BLD AUTO: 0.01 X10(3)/MCL (ref 0–0.04)
IMM GRANULOCYTES NFR BLD AUTO: 0.1 %
LYMPHOCYTES # BLD AUTO: 2.6 X10(3)/MCL (ref 0.6–4.6)
LYMPHOCYTES NFR BLD AUTO: 36.9 %
MCH RBC QN AUTO: 28.8 PG
MCHC RBC AUTO-ENTMCNC: 31.9 G/DL (ref 33–36)
MCV RBC AUTO: 90.3 FL (ref 80–94)
MONOCYTES # BLD AUTO: 0.51 X10(3)/MCL (ref 0.1–1.3)
MONOCYTES NFR BLD AUTO: 7.2 %
NEUTROPHILS # BLD AUTO: 3.68 X10(3)/MCL (ref 2.1–9.2)
NEUTROPHILS NFR BLD AUTO: 52.3 %
PLATELET # BLD AUTO: 225 X10(3)/MCL (ref 130–400)
PMV BLD AUTO: 9.5 FL (ref 7.4–10.4)
POTASSIUM SERPL-SCNC: 4.1 MMOL/L (ref 3.5–5.1)
RBC # BLD AUTO: 4.34 X10(6)/MCL (ref 4.2–5.4)
SODIUM SERPL-SCNC: 141 MMOL/L (ref 136–145)
WBC # SPEC AUTO: 7.1 X10(3)/MCL (ref 4.5–11.5)

## 2023-03-16 PROCEDURE — 85025 COMPLETE CBC W/AUTO DIFF WBC: CPT

## 2023-03-16 PROCEDURE — 83036 HEMOGLOBIN GLYCOSYLATED A1C: CPT

## 2023-03-16 PROCEDURE — 36415 COLL VENOUS BLD VENIPUNCTURE: CPT

## 2023-03-16 PROCEDURE — 80048 BASIC METABOLIC PNL TOTAL CA: CPT

## 2023-03-20 ENCOUNTER — TELEPHONE (OUTPATIENT)
Dept: PRIMARY CARE CLINIC | Facility: CLINIC | Age: 78
End: 2023-03-20
Payer: MEDICARE

## 2023-03-20 PROBLEM — H35.3220 EXUDATIVE AGE-RELATED MACULAR DEGENERATION OF LEFT EYE, UNSPECIFIED STAGE: Status: ACTIVE | Noted: 2023-03-20

## 2023-03-20 NOTE — TELEPHONE ENCOUNTER
----- Message from Yuliya Zapata sent at 3/20/2023 11:06 AM CDT -----  Regarding: med adv  Type:  Needs Medical Advice    Who Called: patient  Symptoms (please be specific): boils on labia and inside    How long has patient had these symptoms:  Friday 03/17/23  Pharmacy name and phone #:  [facility] [phone]  Would the patient rather a call back or a response via [facility]?   Best Call Back Number: [phone]  Additional Information: patient is needing Dr. Taylor to call her an antibiotic Cephalexin 500 mg Caps. Please call patient back.

## 2023-03-20 NOTE — PROGRESS NOTES
Latanya Taylor MD   [address] [place] [address]     Patient ID: [id number]     Chief Complaint: Follow-up (6 month follow up )        HPI:     Kari Gomez is a 77 y.o. female here today for a follow up of HTN and DM. She had a fall, her feet are giving her more trouble now. She did go to do her lab. She is due after Easter for another injection for her eye. It's every 3 months now.     Subjective:     Review of Systems   Constitutional:         Not walking as much with her foot and the weather.    HENT: Negative.     Genitourinary:         She has a boil again in labia, she needs her Keflex   Musculoskeletal:  Positive for myalgias.        Foot spur is coming bad.    Psychiatric/Behavioral: Negative.       Past Medical History:   Diagnosis Date    Allergic sinusitis     Arthritis     Combined hyperlipidemia     Disc degeneration, lumbar     Diverticula of colon     DJD (degenerative joint disease)     External hemorrhoid     Fracture, fibula     Headache, chronic migraine without aura     HTN (hypertension)     Hypothyroidism     Macular degeneration     Neuropathy     Vitamin D deficiency         Past Surgical History:   Procedure Laterality Date    BREAST BIOPSY      BUNIONECTOMY      CATARACT EXTRACTION, BILATERAL      EYE SURGERY      MACULAR DEGENERATION    FRACTURE SURGERY Left 2021    OPEN REDUCTION AND INTERNAL FIXATION (ORIF) OF FRACTURE OF DISTAL RADIUS  10/01/2021    OPEN REDUCTION AND INTERNAL FIXATION (ORIF) OF INJURY OF ANKLE Right 02/01/2019       Family History   Problem Relation Age of Onset    Hyperlipidemia Mother     Lung cancer Mother     COPD Mother     Stroke Father     Hyperlipidemia Father     Glaucoma Paternal Aunt     Breast cancer Maternal Grandmother         Social History     Socioeconomic History    Marital status: Single   Tobacco Use    Smoking status: Never    Smokeless tobacco: Never   Substance and Sexual Activity    Alcohol use: Never    Drug use: Never    Sexual  activity: Not Currently       Review of patient's allergies indicates:   Allergen Reactions    Meperidine      Other reaction(s): doctor said pt is allergic  Other reaction(s): Not Indicated       Outpatient Medications Marked as Taking for the 3/21/23 encounter (Office Visit) with Latanya Taylor MD   Medication Sig Dispense Refill    acetaminophen (TYLENOL ARTHRITIS PAIN ORAL) Take by mouth daily as needed.      alendronate (FOSAMAX) 70 MG tablet TAKE ONE TABLET BY MOUTH ONCE A WEEK 4 tablet 4    ascorbic acid (VITAMIN C ORAL) Take by mouth.      benzonatate (TESSALON) 200 MG capsule TAKE ONE CAPSULE BY MOUTH THREE TIMES A DAY 30 capsule 5    calcium carbonate/vitamin D3 (VITAMIN D-3 ORAL) Take by mouth.      CALCIUM-MAGNESIUM ORAL Take by mouth once daily at 6am.      cholecalciferol, vitamin D3, (VITAMIN D3) 50 mcg (2,000 unit) Cap capsule Take 2,000 Units by mouth once daily.      cyanocobalamin, vitamin B-12, (VITAMIN B-12 ORAL) Take by mouth once daily at 6am.      diltiaZEM (CARDIZEM CD) 240 MG 24 hr capsule TAKE ONE CAPSULE BY MOUTH DAILY 30 capsule 5    DULoxetine (CYMBALTA) 30 MG capsule TAKE ONE CAPSULE BY MOUTH DAILY FOR NEUROPATHY AND MOOD DO NOT CRUSH OR CHEW 30 capsule 11    ECHINACEA 1X ORAL Take by mouth once daily at 6am.      ferrous fumarate/vit Bcomp,C (SUPER B COMPLEX ORAL) Take by mouth.      hydroCHLOROthiazide (HYDRODIURIL) 12.5 MG Tab TAKE ONE TABLET BY MOUTH DAILY 30 tablet 2    ipratropium (ATROVENT) 42 mcg (0.06 %) nasal spray SMARTSI Spray(s) By Mouth 3 Times Daily PRN      ketorolac (TORADOL) 10 mg tablet TAKE ONE TABLET BY MOUTH EVERY FOUR HOURS AS NEEDED FOR PAIN. MAX OF 4 PER DAY FOR FIVE DAYS 20 tablet 1    levothyroxine (SYNTHROID) 125 MCG tablet TAKE ONE TABLET BY MOUTH ONCE DAILY 30 tablet 9    LYSINE ORAL Take by mouth once daily at 6am.      montelukast (SINGULAIR) 10 mg tablet Take 10 mg by mouth once daily.      multivit/folic acid/vit K1 (ONE-A-DAY WOMEN'S 50 PLUS ORAL)  "Take by mouth 2 (two) times a day.      mv-mn/iron/folic acid/herb 190 (VITAMIN D3 COMPLETE ORAL) Vitamin D      polyethylene glycol 3350 (MIRALAX ORAL) Take by mouth.      promethazine-dextromethorphan (PROMETHAZINE-DM) 6.25-15 mg/5 mL Syrp SMARTSI Milliliter(s) By Mouth Every 12 Hours PRN      RED YEAST RICE ORAL Take by mouth 2 (two) times a day.      sumatriptan (IMITREX) 100 MG tablet TAKE ONE TABLET BY MOUTH DAILY AS NEEDED FOR MIGRAINE HEADACHE (MAY REPEAT DOSE AFTER 2 HOURS UP TO A MAXIMUM  MG IN 24 HOURS) 9 tablet 3    UNABLE TO FIND Take 1 capsule by mouth once daily. medication name: K+2 Potassium      UNABLE TO FIND Take 1 capsule by mouth once daily. medication name: K+2 Potassium      vit A/vit C/vit E/zinc/copper (PRESERVISION AREDS ORAL) PreserVision AREDS         Patient Care Team:  Latanya Taylor MD as PCP - General (Internal Medicine)  Marisol Griffin DPM as Consulting Physician (Podiatry)  João Cuenca MD as Consulting Physician (Otolaryngology)  Haroon Monreal MD as Consulting Physician (Orthopedic Surgery)  Keyon Hernandez MD as Consulting Physician (Ophthalmology)  Kelvin Matias MD as Consulting Physician (Otolaryngology)       Objective:     BP (!) 145/75 (BP Location: Right arm, Patient Position: Sitting, BP Method: Large (Automatic))   Pulse 69   Temp 98.5 °F (36.9 °C) (Oral)   Resp 17   Ht 5' 6" (1.676 m)   Wt 93 kg (205 lb)   SpO2 97%   BMI 33.09 kg/m²     Physical Exam  Vitals reviewed.   Cardiovascular:      Rate and Rhythm: Normal rate and regular rhythm.   Pulmonary:      Effort: Pulmonary effort is normal.      Breath sounds: No wheezing or rales.   Abdominal:      General: Bowel sounds are normal.      Palpations: Abdomen is soft.   Skin:     General: Skin is warm and dry.      Comments: Face has healed on the lip after fall       Lab Visit on 2023   Component Date Value    Sodium Level 2023 141     Potassium Level 2023 4.1  "    Chloride 03/16/2023 102     Carbon Dioxide 03/16/2023 31     Glucose Level 03/16/2023 119 (H)     Blood Urea Nitrogen 03/16/2023 16.5     Creatinine 03/16/2023 0.68     BUN/Creatinine Ratio 03/16/2023 24     Calcium Level Total 03/16/2023 10.0     Anion Gap 03/16/2023 8.0     eGFR 03/16/2023 >60     Hemoglobin A1c 03/16/2023 5.5     Estimated Average Glucose 03/16/2023 111.2     WBC 03/16/2023 7.1     RBC 03/16/2023 4.34     Hgb 03/16/2023 12.5     Hct 03/16/2023 39.2     MCV 03/16/2023 90.3     MCH 03/16/2023 28.8     MCHC 03/16/2023 31.9 (L)     RDW 03/16/2023 13.2     Platelet 03/16/2023 225     MPV 03/16/2023 9.5     Neut % 03/16/2023 52.3     Lymph % 03/16/2023 36.9     Mono % 03/16/2023 7.2     Eos % 03/16/2023 3.1     Basophil % 03/16/2023 0.4     Lymph # 03/16/2023 2.60     Neut # 03/16/2023 3.68     Mono # 03/16/2023 0.51     Eos # 03/16/2023 0.22     Baso # 03/16/2023 0.03     IG# 03/16/2023 0.01     IG% 03/16/2023 0.1        Assessment/Problems:       ICD-10-CM ICD-9-CM   1. Essential hypertension  I10 401.9   2. Controlled type 2 diabetes mellitus without complication, without long-term current use of insulin  E11.9 250.00   3. Primary osteoarthritis involving multiple joints  M15.9 715.98   4. Exudative age-related macular degeneration of left eye, unspecified stage  H35.3220 362.52   5. Boil of vulva  N76.4 616.4        Plan:     1. Essential hypertension  Comments:  High today, will watch at home and let us know    2. Controlled type 2 diabetes mellitus without complication, without long-term current use of insulin  Comments:  Excellent, A1C is 5.5 now    3. Primary osteoarthritis involving multiple joints  Comments:  Talk with Dr Irwin about the shoe build for her to use the boot.     4. Exudative age-related macular degeneration of left eye, unspecified stage  Comments:  She's getting her injections every 3 mo, will get records    5. Boil of vulva  Comments:  Add keflex today    Other  orders  -     cephALEXin (KEFLEX) 500 MG capsule; Take 1 capsule (500 mg total) by mouth every 8 (eight) hours. for 10 days  Dispense: 30 capsule; Refill: 0             Follow up in about 3 months (around 6/21/2023) for Diabetes . In addition to their scheduled follow up, the patient has also been instructed to follow up on as needed basis.     Signature:  Latanya Taylor MD  [facility]  [address] [address]

## 2023-03-21 ENCOUNTER — OFFICE VISIT (OUTPATIENT)
Dept: PRIMARY CARE CLINIC | Facility: CLINIC | Age: 78
End: 2023-03-21
Payer: MEDICARE

## 2023-03-21 VITALS
TEMPERATURE: 99 F | WEIGHT: 205 LBS | OXYGEN SATURATION: 97 % | HEART RATE: 69 BPM | BODY MASS INDEX: 32.95 KG/M2 | SYSTOLIC BLOOD PRESSURE: 145 MMHG | RESPIRATION RATE: 17 BRPM | HEIGHT: 66 IN | DIASTOLIC BLOOD PRESSURE: 75 MMHG

## 2023-03-21 DIAGNOSIS — M15.9 PRIMARY OSTEOARTHRITIS INVOLVING MULTIPLE JOINTS: ICD-10-CM

## 2023-03-21 DIAGNOSIS — I10 ESSENTIAL HYPERTENSION: Primary | ICD-10-CM

## 2023-03-21 DIAGNOSIS — N76.4 BOIL OF VULVA: ICD-10-CM

## 2023-03-21 DIAGNOSIS — H35.3220 EXUDATIVE AGE-RELATED MACULAR DEGENERATION OF LEFT EYE, UNSPECIFIED STAGE: ICD-10-CM

## 2023-03-21 DIAGNOSIS — E11.9 CONTROLLED TYPE 2 DIABETES MELLITUS WITHOUT COMPLICATION, WITHOUT LONG-TERM CURRENT USE OF INSULIN: ICD-10-CM

## 2023-03-21 PROCEDURE — 99214 PR OFFICE/OUTPT VISIT, EST, LEVL IV, 30-39 MIN: ICD-10-PCS | Mod: ,,, | Performed by: INTERNAL MEDICINE

## 2023-03-21 PROCEDURE — 99214 OFFICE O/P EST MOD 30 MIN: CPT | Mod: ,,, | Performed by: INTERNAL MEDICINE

## 2023-03-21 RX ORDER — CEPHALEXIN 500 MG/1
500 CAPSULE ORAL EVERY 8 HOURS
Qty: 30 CAPSULE | Refills: 0 | Status: SHIPPED | OUTPATIENT
Start: 2023-03-21 | End: 2023-03-31

## 2023-03-24 PROBLEM — H35.371 EPIRETINAL MEMBRANE (ERM) OF RIGHT EYE: Status: ACTIVE | Noted: 2023-03-24

## 2023-03-29 ENCOUNTER — DOCUMENTATION ONLY (OUTPATIENT)
Dept: PRIMARY CARE CLINIC | Facility: CLINIC | Age: 78
End: 2023-03-29
Payer: MEDICARE

## 2023-05-01 RX ORDER — DILTIAZEM HYDROCHLORIDE 240 MG/1
CAPSULE, COATED, EXTENDED RELEASE ORAL
Qty: 30 CAPSULE | Refills: 5 | Status: SHIPPED | OUTPATIENT
Start: 2023-05-01 | End: 2023-09-26 | Stop reason: SDUPTHER

## 2023-05-30 ENCOUNTER — TELEPHONE (OUTPATIENT)
Dept: PRIMARY CARE CLINIC | Facility: CLINIC | Age: 78
End: 2023-05-30
Payer: MEDICARE

## 2023-05-30 DIAGNOSIS — R53.1 WEAKNESS: ICD-10-CM

## 2023-05-30 DIAGNOSIS — U07.1 COVID-19: Primary | ICD-10-CM

## 2023-05-30 NOTE — TELEPHONE ENCOUNTER
----- Message from Yuliya Zapata sent at 5/30/2023  9:37 AM CDT -----  Regarding: med adv  Type:  Needs Medical Advice    Who Called: patient  Symptoms (please be specific): weakness and cough   How long has patient had these symptoms:  since 05/19/23  Pharmacy name and phone #:  [facility] in [place]  Would the patient rather a call back or a response via [facility]?   Best Call Back Number: [phone]  Additional Information: had Covid on 05/19/23 and visited Urgent Care 05/28/23 and was given Covid medication and want to know if Dr. Taylor wants her to have a chest xray and labs. Please call patient back.

## 2023-05-30 NOTE — TELEPHONE ENCOUNTER
If she's short of breath then yes she needs a CXR. If she's getting better then we don't have to have the xray or lab.

## 2023-05-31 NOTE — TELEPHONE ENCOUNTER
Spoke with the patient and she stated she has 2 inhalers that came in a kit. She also stated she will go get her labs done as well.

## 2023-05-31 NOTE — TELEPHONE ENCOUNTER
Spoke with the patient and she stated she is not having any shortness of breath but she does still have a cough and she is very weak.

## 2023-06-07 ENCOUNTER — TELEPHONE (OUTPATIENT)
Dept: PRIMARY CARE CLINIC | Facility: CLINIC | Age: 78
End: 2023-06-07
Payer: MEDICARE

## 2023-06-07 RX ORDER — PROMETHAZINE HYDROCHLORIDE AND DEXTROMETHORPHAN HYDROBROMIDE 6.25; 15 MG/5ML; MG/5ML
SYRUP ORAL
Qty: 180 ML | Refills: 1 | Status: SHIPPED | OUTPATIENT
Start: 2023-06-07 | End: 2023-09-26 | Stop reason: ALTCHOICE

## 2023-06-07 NOTE — TELEPHONE ENCOUNTER
----- Message from Radha Roth sent at 6/7/2023  2:54 PM CDT -----  .Type:  Needs Medical Advice    Who Called: pt   Symptoms (please be specific): Covid    How long has patient had these symptoms:  three weeks ago   Pharmacy name and phone #:  [facility] - [facility], [place] - [address]  Would the patient rather a call back or a response via [facility]? Call back   Best Call Back Number: [phone]  Additional Information: pt is requesting a cough syrup , still has a cough, please call

## 2023-06-14 ENCOUNTER — TELEPHONE (OUTPATIENT)
Dept: PRIMARY CARE CLINIC | Facility: CLINIC | Age: 78
End: 2023-06-14
Payer: MEDICARE

## 2023-06-14 NOTE — TELEPHONE ENCOUNTER
Are there any outstanding task in patient chart?  Y labs     2. Do we have outstanding/pending referrals?  N     3. Has the patient been seen in an ER, Urgent Care, or admitted since last visit?  N     4. Has patient seen any other healthcare providers since last visit?  N     5. Has patient had any blood work or xrays done since last visit?  N   6. Is the patient's pneumonia vaccine current?  Prevnar 13:n/a  Pneumonia 20:n/a  Pneumonia 23:7/27/15    7. When was the patient's colonoscopy?  N/a  8. When was the patient's last mamogram?  N/a  9. When was the patient's last cervical screening/PAP smear?  N/a  10. When was the patient's last bone scan?  1/11/22  11. When was the patient's last diabetic screening?  A1c:3/16/23  Micro: 7/28/22  Eye Exam: 2/16/23

## 2023-06-19 ENCOUNTER — LAB VISIT (OUTPATIENT)
Dept: LAB | Facility: HOSPITAL | Age: 78
End: 2023-06-19
Attending: INTERNAL MEDICINE
Payer: MEDICARE

## 2023-06-19 DIAGNOSIS — U07.1 COVID-19: ICD-10-CM

## 2023-06-19 DIAGNOSIS — R53.1 WEAKNESS: ICD-10-CM

## 2023-06-19 LAB
ALBUMIN SERPL-MCNC: 3.8 G/DL (ref 3.4–4.8)
ALBUMIN/GLOB SERPL: 1.5 RATIO (ref 1.1–2)
ALP SERPL-CCNC: 88 UNIT/L (ref 40–150)
ALT SERPL-CCNC: 22 UNIT/L (ref 0–55)
AST SERPL-CCNC: 24 UNIT/L (ref 5–34)
BASOPHILS # BLD AUTO: 0.02 X10(3)/MCL
BASOPHILS NFR BLD AUTO: 0.3 %
BILIRUBIN DIRECT+TOT PNL SERPL-MCNC: 1 MG/DL
BUN SERPL-MCNC: 18.3 MG/DL (ref 9.8–20.1)
CALCIUM SERPL-MCNC: 10 MG/DL (ref 8.4–10.2)
CHLORIDE SERPL-SCNC: 102 MMOL/L (ref 98–107)
CO2 SERPL-SCNC: 29 MMOL/L (ref 23–31)
CREAT SERPL-MCNC: 0.71 MG/DL (ref 0.55–1.02)
EOSINOPHIL # BLD AUTO: 0.22 X10(3)/MCL (ref 0–0.9)
EOSINOPHIL NFR BLD AUTO: 3.2 %
ERYTHROCYTE [DISTWIDTH] IN BLOOD BY AUTOMATED COUNT: 14.4 % (ref 11.5–17)
FERRITIN SERPL-MCNC: 19.1 NG/ML (ref 4.63–204)
GFR SERPLBLD CREATININE-BSD FMLA CKD-EPI: >60 MLS/MIN/1.73/M2
GLOBULIN SER-MCNC: 2.6 GM/DL (ref 2.4–3.5)
GLUCOSE SERPL-MCNC: 122 MG/DL (ref 82–115)
HCT VFR BLD AUTO: 40.7 % (ref 37–47)
HGB BLD-MCNC: 12.4 G/DL (ref 12–16)
IMM GRANULOCYTES # BLD AUTO: 0.01 X10(3)/MCL (ref 0–0.04)
IMM GRANULOCYTES NFR BLD AUTO: 0.1 %
LYMPHOCYTES # BLD AUTO: 2.55 X10(3)/MCL (ref 0.6–4.6)
LYMPHOCYTES NFR BLD AUTO: 37.2 %
MCH RBC QN AUTO: 27.5 PG (ref 27–31)
MCHC RBC AUTO-ENTMCNC: 30.5 G/DL (ref 33–36)
MCV RBC AUTO: 90.2 FL (ref 80–94)
MONOCYTES # BLD AUTO: 0.52 X10(3)/MCL (ref 0.1–1.3)
MONOCYTES NFR BLD AUTO: 7.6 %
NEUTROPHILS # BLD AUTO: 3.54 X10(3)/MCL (ref 2.1–9.2)
NEUTROPHILS NFR BLD AUTO: 51.6 %
PLATELET # BLD AUTO: 222 X10(3)/MCL (ref 130–400)
PMV BLD AUTO: 9.5 FL (ref 7.4–10.4)
POTASSIUM SERPL-SCNC: 3.9 MMOL/L (ref 3.5–5.1)
PROT SERPL-MCNC: 6.4 GM/DL (ref 5.8–7.6)
RBC # BLD AUTO: 4.51 X10(6)/MCL (ref 4.2–5.4)
SODIUM SERPL-SCNC: 140 MMOL/L (ref 136–145)
WBC # SPEC AUTO: 6.86 X10(3)/MCL (ref 4.5–11.5)

## 2023-06-19 PROCEDURE — 80053 COMPREHEN METABOLIC PANEL: CPT

## 2023-06-19 PROCEDURE — 36415 COLL VENOUS BLD VENIPUNCTURE: CPT

## 2023-06-19 PROCEDURE — 82728 ASSAY OF FERRITIN: CPT

## 2023-06-19 PROCEDURE — 85025 COMPLETE CBC W/AUTO DIFF WBC: CPT

## 2023-06-20 NOTE — PROGRESS NOTES
Patient ID: [id number]     Chief Complaint: Medicare AWV      HPI:     Kari Gomez is a 78 y.o. female here today for a Medicare Wellness. She did have COVID last month. Since here she went to Urgent care and they wanted her to recheck with dermatology. She said it was ok. They had seen fluid in ear and she went to ENT and it was showing same so they want on nasal spray.       Opioid Screening: Patient medication list reviewed, patient is not taking prescription opioids. Patient is not using additional opioids than prescribed. Patient is at low risk of substance abuse based on this opioid use history.       Past Medical History:   Diagnosis Date    Allergic sinusitis     Arthritis     Combined hyperlipidemia     Disc degeneration, lumbar     Diverticula of colon     DJD (degenerative joint disease)     External hemorrhoid     Fracture, fibula     Headache, chronic migraine without aura     HTN (hypertension)     Hypothyroidism     Macular degeneration     Neuropathy     Vitamin D deficiency         Past Surgical History:   Procedure Laterality Date    BREAST BIOPSY      BUNIONECTOMY      CATARACT EXTRACTION, BILATERAL      EYE SURGERY      MACULAR DEGENERATION    FRACTURE SURGERY Left 2021    OPEN REDUCTION AND INTERNAL FIXATION (ORIF) OF FRACTURE OF DISTAL RADIUS  10/01/2021    OPEN REDUCTION AND INTERNAL FIXATION (ORIF) OF INJURY OF ANKLE Right 02/01/2019       Review of patient's allergies indicates:   Allergen Reactions    Meperidine      Other reaction(s): doctor said pt is allergic  Other reaction(s): Not Indicated       Outpatient Medications Marked as Taking for the 6/21/23 encounter (Office Visit) with Latanya Taylor MD   Medication Sig Dispense Refill    acetaminophen (TYLENOL ARTHRITIS PAIN ORAL) Take by mouth daily as needed.      alendronate (FOSAMAX) 70 MG tablet TAKE ONE TABLET BY MOUTH ONCE A WEEK 4 tablet 4    ascorbic acid (VITAMIN C ORAL) Take by mouth.      benzonatate (TESSALON) 200  MG capsule TAKE ONE CAPSULE BY MOUTH THREE TIMES A DAY 30 capsule 5    calcium carbonate/vitamin D3 (VITAMIN D-3 ORAL) Take by mouth.      CALCIUM-MAGNESIUM ORAL Take by mouth once daily at 6am.      cholecalciferol, vitamin D3, (VITAMIN D3) 50 mcg (2,000 unit) Cap capsule Take 2,000 Units by mouth once daily.      cyanocobalamin, vitamin B-12, (VITAMIN B-12 ORAL) Take by mouth once daily at 6am.      diltiaZEM (CARDIZEM CD) 240 MG 24 hr capsule TAKE ONE CAPSULE BY MOUTH DAILY 30 capsule 5    docusate sodium (COLACE) 100 MG capsule Take 100 mg by mouth 2 (two) times daily.      DULoxetine (CYMBALTA) 30 MG capsule TAKE ONE CAPSULE BY MOUTH DAILY FOR NEUROPATHY AND MOOD DO NOT CRUSH OR CHEW 30 capsule 11    ECHINACEA 1X ORAL Take by mouth once daily at 6am.      ferrous fumarate/vit Bcomp,C (SUPER B COMPLEX ORAL) Take by mouth.      fluticasone propionate (FLONASE) 50 mcg/actuation nasal spray 1 spray by Each Nostril route 2 (two) times daily.      hydroCHLOROthiazide (HYDRODIURIL) 12.5 MG Tab TAKE ONE TABLET BY MOUTH DAILY 30 tablet 2    ipratropium (ATROVENT) 42 mcg (0.06 %) nasal spray SMARTSI Spray(s) By Mouth 3 Times Daily PRN      ketorolac (TORADOL) 10 mg tablet TAKE ONE TABLET BY MOUTH EVERY FOUR HOURS AS NEEDED FOR PAIN. MAX OF 4 PER DAY FOR FIVE DAYS 20 tablet 1    levothyroxine (SYNTHROID) 125 MCG tablet TAKE ONE TABLET BY MOUTH ONCE DAILY 30 tablet 9    LYSINE ORAL Take by mouth once daily at 6am.      magnesium salicylate (DOANS PILLS ORAL) Take by mouth.      montelukast (SINGULAIR) 10 mg tablet TAKE ONE TABLET BY MOUTH DAILY 30 tablet 11    multivit/folic acid/vit K1 (ONE-A-DAY WOMEN'S 50 PLUS ORAL) Take by mouth 2 (two) times a day.      mv-mn/iron/folic acid/herb 190 (VITAMIN D3 COMPLETE ORAL) Vitamin D      polyethylene glycol 3350 (MIRALAX ORAL) Take by mouth.      promethazine-dextromethorphan (PROMETHAZINE-DM) 6.25-15 mg/5 mL Syrp SMARTSI Milliliter(s) By Mouth Every 12 Hours  mL 1     RED YEAST RICE ORAL Take by mouth 2 (two) times a day.      sumatriptan (IMITREX) 100 MG tablet TAKE ONE TABLET BY MOUTH DAILY AS NEEDED FOR MIGRAINE HEADACHE (MAY REPEAT DOSE AFTER 2 HOURS UP TO A MAXIMUM  MG IN 24 HOURS) 9 tablet 3    UNABLE TO FIND Take 1 capsule by mouth once daily. medication name: K+2 Potassium      UNABLE TO FIND Take 1 capsule by mouth once daily. medication name: K+2 Potassium      vit A/vit C/vit E/zinc/copper (PRESERVISION AREDS ORAL) PreserVision AREDS         Social History     Socioeconomic History    Marital status: Single   Tobacco Use    Smoking status: Never    Smokeless tobacco: Never   Substance and Sexual Activity    Alcohol use: Never    Drug use: Never    Sexual activity: Not Currently     Social Determinants of Health     Financial Resource Strain: Low Risk     Difficulty of Paying Living Expenses: Not hard at all   Food Insecurity: No Food Insecurity    Worried About Running Out of Food in the Last Year: Never true    Ran Out of Food in the Last Year: Never true   Transportation Needs: No Transportation Needs    Lack of Transportation (Medical): No    Lack of Transportation (Non-Medical): No   Physical Activity: Sufficiently Active    Days of Exercise per Week: 5 days    Minutes of Exercise per Session: 30 min   Stress: No Stress Concern Present    Feeling of Stress : Not at all   Social Connections: Moderately Integrated    Frequency of Communication with Friends and Family: More than three times a week    Frequency of Social Gatherings with Friends and Family: Three times a week    Attends [employer] Services: More than 4 times per year    Active Member of Clubs or Organizations: Yes    Attends Club or Organization Meetings: 1 to 4 times per year    Marital Status: Never    Housing Stability: Low Risk     Unable to Pay for Housing in the Last Year: No    Number of Places Lived in the Last Year: 1    Unstable Housing in the Last Year: No        Family History  "  Problem Relation Age of Onset    Hyperlipidemia Mother     Lung cancer Mother     COPD Mother     Stroke Father     Hyperlipidemia Father     Glaucoma Paternal Aunt     Breast cancer Maternal Grandmother         Patient Care Team:  Latanya Taylor MD as PCP - General (Internal Medicine)  Marisol Griffin DPM as Consulting Physician (Podiatry)  João Cuenca MD as Consulting Physician (Otolaryngology)  Haroon Monreal MD as Consulting Physician (Orthopedic Surgery)  Keyon Hernandez MD as Consulting Physician (Ophthalmology)  Kelvin Matias MD as Consulting Physician (Otolaryngology)       Subjective:     Review of Systems   Constitutional:  Positive for malaise/fatigue. Negative for chills and fever.        Falls asleep in afternoon, it's since COVID   HENT:  Positive for congestion and sore throat.         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    Eyes:         Vision has been holding   Respiratory:  Positive for cough. Negative for sputum production and wheezing.    Cardiovascular: Negative.    Gastrointestinal:  Negative for diarrhea, nausea and vomiting.   Genitourinary:  Negative for dysuria and frequency.   Musculoskeletal:  Positive for myalgias.   Neurological:  Positive for sensory change. Negative for dizziness and headaches.   Psychiatric/Behavioral:  Negative for depression. The patient is not nervous/anxious.        Patient Reported Health Risk Assessment       Objective:     /71 (BP Location: Left arm, Patient Position: Sitting, BP Method: Large (Automatic))   Pulse 70   Temp 98.2 °F (36.8 °C) (Oral)   Resp 18   Ht 5' 6" (1.676 m)   Wt 97.5 kg (215 lb)   SpO2 97%   BMI 34.70 kg/m²     Physical Exam  Vitals reviewed.   Constitutional:       Appearance: She is obese. She is not ill-appearing.   HENT:      Head: Normocephalic and atraumatic.      Right Ear: Tympanic membrane, ear canal and external ear " normal.      Left Ear: Tympanic membrane, ear canal and external ear normal.      Mouth/Throat:      Mouth: Mucous membranes are moist.      Pharynx: No oropharyngeal exudate or posterior oropharyngeal erythema.   Eyes:      General: No scleral icterus.     Pupils: Pupils are equal, round, and reactive to light.   Cardiovascular:      Rate and Rhythm: Regular rhythm.      Heart sounds: Murmur heard.     No gallop.   Pulmonary:      Effort: Pulmonary effort is normal.      Breath sounds: Normal breath sounds. No wheezing or rales.   Abdominal:      Palpations: Abdomen is soft.      Tenderness: There is no abdominal tenderness. There is no guarding.   Musculoskeletal:         General: Tenderness present.      Cervical back: No rigidity.      Comments: Bracing on the left ankle   Lymphadenopathy:      Cervical: No cervical adenopathy.   Skin:     General: Skin is warm and dry.      Coloration: Skin is pale.   Neurological:      Mental Status: She is alert and oriented to person, place, and time. Mental status is at baseline.      Motor: Weakness present.      Gait: Gait abnormal.   Psychiatric:         Mood and Affect: Mood normal.         Behavior: Behavior normal.         Thought Content: Thought content normal.         Judgment: Judgment normal.         No flowsheet data found.  Fall Risk Assessment - Outpatient 6/21/2023 3/21/2023 12/20/2022 7/26/2022 6/21/2022   Mobility Status Ambulatory w/ assistance Ambulatory Ambulatory w/ assistance - Ambulatory w/ assistance   Number of falls 0 1 1 0 0   Identified as fall risk 1 0 1 0 0              Lab Visit on 06/19/2023   Component Date Value    Sodium Level 06/19/2023 140     Potassium Level 06/19/2023 3.9     Chloride 06/19/2023 102     Carbon Dioxide 06/19/2023 29     Glucose Level 06/19/2023 122 (H)     Blood Urea Nitrogen 06/19/2023 18.3     Creatinine 06/19/2023 0.71     Calcium Level Total 06/19/2023 10.0     Protein Total 06/19/2023 6.4     Albumin Level  06/19/2023 3.8     Globulin 06/19/2023 2.6     Albumin/Globulin Ratio 06/19/2023 1.5     Bilirubin Total 06/19/2023 1.0     Alkaline Phosphatase 06/19/2023 88     Alanine Aminotransferase 06/19/2023 22     Aspartate Aminotransfera* 06/19/2023 24     eGFR 06/19/2023 >60     Ferritin Level 06/19/2023 19.10     WBC 06/19/2023 6.86     RBC 06/19/2023 4.51     Hgb 06/19/2023 12.4     Hct 06/19/2023 40.7     MCV 06/19/2023 90.2     MCH 06/19/2023 27.5     MCHC 06/19/2023 30.5 (L)     RDW 06/19/2023 14.4     Platelet 06/19/2023 222     MPV 06/19/2023 9.5     Neut % 06/19/2023 51.6     Lymph % 06/19/2023 37.2     Mono % 06/19/2023 7.6     Eos % 06/19/2023 3.2     Basophil % 06/19/2023 0.3     Lymph # 06/19/2023 2.55     Neut # 06/19/2023 3.54     Mono # 06/19/2023 0.52     Eos # 06/19/2023 0.22     Baso # 06/19/2023 0.02     IG# 06/19/2023 0.01     IG% 06/19/2023 0.1        Assessment/Plan:     1. Medicare annual wellness visit, subsequent  -     (In Office Administered) Pneumococcal Conjugate Vaccine (20 Valent) (IM)    2. Essential hypertension    3. Controlled type 2 diabetes mellitus without complication, without long-term current use of insulin    4. Acquired hypothyroidism    5. Exudative age-related macular degeneration of left eye, unspecified stage    6. Need for pneumococcal vaccination  -     (In Office Administered) Pneumococcal Conjugate Vaccine (20 Valent) (IM)    Other orders  -     cefadroxil (DURICEF) 500 MG Cap; Take 1 capsule (500 mg total) by mouth every 12 (twelve) hours. for 10 days  Dispense: 20 capsule; Refill: 0           Medicare Annual Wellness and Personalized Prevention Plan:   Fall Risk + Home Safety + Hearing Impairment + Depression Screen + Opioid and Substance Abuse Screening + Cognitive Impairment Screen + Health Risk Assessment all reviewed.     Health Maintenance Topics with due status: Not Due       Topic Last Completion Date    Influenza Vaccine 11/02/2020    DEXA Scan 01/11/2022     Lipid Panel 07/28/2022    Eye Exam 02/16/2023    Hemoglobin A1c 03/16/2023      The patient's Health Maintenance was reviewed and the following appears to be due at this time:   Health Maintenance Due   Topic Date Due    Hepatitis C Screening  Never done    TETANUS VACCINE  Never done    Pneumococcal Vaccines (Age 65+) (2 - PCV) 07/27/2016    Shingles Vaccine (2 of 3) 02/13/2017    COVID-19 Vaccine (3 - Pfizer series) 04/14/2021    Diabetes Urine Screening  07/28/2023       Advance Care Planning   I attest to discussing Advance Care Planning with patient and/or family member.  Education was provided including the importance of the Health Care Power of , Advance Directives, and/or [place] documentation.  The patient expressed understanding to the importance of this information and discussion.   Advance Care Planning     Date: 06/21/2023  She has a living will and we note in [employer] we scanned in but I can't find it. She'll try to bring me a copy. She's done all her paperwork with  since she doesn't have children. Discussion 8 min.           Follow up in about 3 months (around 9/21/2023) for Medication Managment. In addition to their scheduled follow up, the patient has also been instructed to follow up on as needed basis.

## 2023-06-21 ENCOUNTER — OFFICE VISIT (OUTPATIENT)
Dept: PRIMARY CARE CLINIC | Facility: CLINIC | Age: 78
End: 2023-06-21
Payer: MEDICARE

## 2023-06-21 VITALS
HEART RATE: 70 BPM | WEIGHT: 215 LBS | BODY MASS INDEX: 34.55 KG/M2 | TEMPERATURE: 98 F | SYSTOLIC BLOOD PRESSURE: 134 MMHG | OXYGEN SATURATION: 97 % | RESPIRATION RATE: 18 BRPM | HEIGHT: 66 IN | DIASTOLIC BLOOD PRESSURE: 71 MMHG

## 2023-06-21 DIAGNOSIS — U09.9 POST-COVID CHRONIC COUGH: ICD-10-CM

## 2023-06-21 DIAGNOSIS — Z23 NEED FOR PNEUMOCOCCAL VACCINATION: ICD-10-CM

## 2023-06-21 DIAGNOSIS — Z00.00 MEDICARE ANNUAL WELLNESS VISIT, SUBSEQUENT: Primary | ICD-10-CM

## 2023-06-21 DIAGNOSIS — E03.9 ACQUIRED HYPOTHYROIDISM: ICD-10-CM

## 2023-06-21 DIAGNOSIS — E11.9 CONTROLLED TYPE 2 DIABETES MELLITUS WITHOUT COMPLICATION, WITHOUT LONG-TERM CURRENT USE OF INSULIN: ICD-10-CM

## 2023-06-21 DIAGNOSIS — Z11.59 ENCOUNTER FOR HEPATITIS C SCREENING TEST FOR LOW RISK PATIENT: ICD-10-CM

## 2023-06-21 DIAGNOSIS — H35.3220 EXUDATIVE AGE-RELATED MACULAR DEGENERATION OF LEFT EYE, UNSPECIFIED STAGE: ICD-10-CM

## 2023-06-21 DIAGNOSIS — I10 ESSENTIAL HYPERTENSION: ICD-10-CM

## 2023-06-21 DIAGNOSIS — R05.3 POST-COVID CHRONIC COUGH: ICD-10-CM

## 2023-06-21 PROCEDURE — G0009 PNEUMOCOCCAL CONJUGATE VACCINE 20-VALENT: ICD-10-PCS | Mod: ICN,,, | Performed by: INTERNAL MEDICINE

## 2023-06-21 PROCEDURE — 1160F PR REVIEW ALL MEDS BY PRESCRIBER/CLIN PHARMACIST DOCUMENTED: ICD-10-PCS | Mod: CPTII,ICN,, | Performed by: INTERNAL MEDICINE

## 2023-06-21 PROCEDURE — 3288F PR FALLS RISK ASSESSMENT DOCUMENTED: ICD-10-PCS | Mod: CPTII,ICN,, | Performed by: INTERNAL MEDICINE

## 2023-06-21 PROCEDURE — 3078F DIAST BP <80 MM HG: CPT | Mod: CPTII,ICN,, | Performed by: INTERNAL MEDICINE

## 2023-06-21 PROCEDURE — G0439 PPPS, SUBSEQ VISIT: HCPCS | Mod: ICN,,, | Performed by: INTERNAL MEDICINE

## 2023-06-21 PROCEDURE — 1159F PR MEDICATION LIST DOCUMENTED IN MEDICAL RECORD: ICD-10-PCS | Mod: CPTII,ICN,, | Performed by: INTERNAL MEDICINE

## 2023-06-21 PROCEDURE — 3075F PR MOST RECENT SYSTOLIC BLOOD PRESS GE 130-139MM HG: ICD-10-PCS | Mod: CPTII,ICN,, | Performed by: INTERNAL MEDICINE

## 2023-06-21 PROCEDURE — 3288F FALL RISK ASSESSMENT DOCD: CPT | Mod: CPTII,ICN,, | Performed by: INTERNAL MEDICINE

## 2023-06-21 PROCEDURE — 90677 PCV20 VACCINE IM: CPT | Mod: ICN,,, | Performed by: INTERNAL MEDICINE

## 2023-06-21 PROCEDURE — 3078F PR MOST RECENT DIASTOLIC BLOOD PRESSURE < 80 MM HG: ICD-10-PCS | Mod: CPTII,ICN,, | Performed by: INTERNAL MEDICINE

## 2023-06-21 PROCEDURE — 1160F RVW MEDS BY RX/DR IN RCRD: CPT | Mod: CPTII,ICN,, | Performed by: INTERNAL MEDICINE

## 2023-06-21 PROCEDURE — 1101F PT FALLS ASSESS-DOCD LE1/YR: CPT | Mod: CPTII,CN,, | Performed by: INTERNAL MEDICINE

## 2023-06-21 PROCEDURE — 90677 PNEUMOCOCCAL CONJUGATE VACCINE 20-VALENT: ICD-10-PCS | Mod: ICN,,, | Performed by: INTERNAL MEDICINE

## 2023-06-21 PROCEDURE — 3075F SYST BP GE 130 - 139MM HG: CPT | Mod: CPTII,ICN,, | Performed by: INTERNAL MEDICINE

## 2023-06-21 PROCEDURE — G0009 ADMIN PNEUMOCOCCAL VACCINE: HCPCS | Mod: ICN,,, | Performed by: INTERNAL MEDICINE

## 2023-06-21 PROCEDURE — 1101F PR PT FALLS ASSESS DOC 0-1 FALLS W/OUT INJ PAST YR: ICD-10-PCS | Mod: CPTII,CN,, | Performed by: INTERNAL MEDICINE

## 2023-06-21 PROCEDURE — G0439 PR MEDICARE ANNUAL WELLNESS SUBSEQUENT VISIT: ICD-10-PCS | Mod: ICN,,, | Performed by: INTERNAL MEDICINE

## 2023-06-21 PROCEDURE — 1125F AMNT PAIN NOTED PAIN PRSNT: CPT | Mod: CPTII,ICN,, | Performed by: INTERNAL MEDICINE

## 2023-06-21 PROCEDURE — 1159F MED LIST DOCD IN RCRD: CPT | Mod: CPTII,ICN,, | Performed by: INTERNAL MEDICINE

## 2023-06-21 PROCEDURE — 1125F PR PAIN SEVERITY QUANTIFIED, PAIN PRESENT: ICD-10-PCS | Mod: CPTII,ICN,, | Performed by: INTERNAL MEDICINE

## 2023-06-21 RX ORDER — CEFADROXIL 500 MG/1
500 CAPSULE ORAL EVERY 12 HOURS
Qty: 20 CAPSULE | Refills: 0 | Status: SHIPPED | OUTPATIENT
Start: 2023-06-21 | End: 2023-07-01

## 2023-06-21 RX ORDER — FLUTICASONE PROPIONATE 50 MCG
1 SPRAY, SUSPENSION (ML) NASAL 2 TIMES DAILY
COMMUNITY
Start: 2023-05-21

## 2023-06-21 NOTE — PATIENT INSTRUCTIONS
Patient Education       Advance Directives   The Basics   Written by the doctors and editors at [facility]   What are advance directives? -- Advance directives are legal documents that allow you to spell out ahead of time what of types medical care you would want if you ever became unable to speak for yourself. These documents can help ensure that you get the care you want even if you have an unexpected serious illness or accident. The documents can also make things easier for the people who will need to make decisions for you if you ever become unable to make them for yourself.  Are there different kinds of advance directives? -- Yes. The most useful kinds of advance directives are:  Health care proxy (also called the durable power of  for health care) - The health care proxy document allows you to choose someone to make medical decisions for you if you become unable to speak for yourself. The benefit of having this document is that it makes your choice of a decision-maker clear to your doctors and family members. When you choose a health care proxy, it is important to talk to the person you choose about the things that you do or don't want. That way your decision-maker knows what to do later on if they ever have to speak for you.  Living will - A living will is the document that tells health care providers what type of care you want if you become unable to speak for yourself. For instance, a living will allows you to record in writing whether you would want a feeding tube put in if you had a serious illness or accident.  Do not resuscitate/do not intubate order (also called a DNR/DNI) - If you decide you do not want your heart restarted if it stops and you do not want a breathing tube put in if you stop breathing, you can ask for a DNR/DNI. This is a form that must be signed by a doctor. It tells all your health care providers that you have decided you do not want these treatments.  Advance directives work  best when they are part of a team effort that includes not only the person making the decisions, but also doctors, emergency health workers, and places like hospitals and nursing homes.  The Physician Orders for Life Sustaining Treatment (POLST) is a form for people who already have a serious illness or are very weak and likely to need medical help. The POLST form spells out exactly what care should be given, and not given, based on your choices and wishes. It is signed by your doctor, and you can keep a copy at home to be used in the event of an emergency. A copy is also kept on file at any hospital or other place where you might get medical care. Not every state has a POLST program. To find out if your state has one, you can go online to www.polst.org.  How do I choose a health care proxy? -- Choose someone who:  You know and trust  Can separate their own wishes from yours  You know would carry out your wishes if that became necessary  Could be easily reached if they were needed  Could handle it if other family members or loved ones wanted you to get treated differently than you would want  Some people choose a second person as an alternate proxy, in case their first choice cannot be reached at the time decisions need to be made.  Who should have an advance directive? -- Advance directives are a good idea for anyone, but they are especially important if:  You are older than 65.  You have a serious life-threatening illness, such as advanced cancer, or end-stage heart or liver failure.  You want to make sure you can choose the person you would like as your health care proxy (decision-maker).  What kinds of decisions will I need to make? -- Your advance directives can have as much or as little detail as you want. But many people who have advance directives record their wishes about the following treatments:  Breathing tubes - If you stop breathing or are having a very hard time breathing, you can get attached to a  "machine that will help you breathe. For that to happen, you will have to be "intubated." That means that a tube will be put down your throat and into your lungs. Then the tube will be connected to a breathing machine. When the tube is in place, you will not be able to talk, at least at first. Plus, you will probably be sedated, meaning that you are on medicines that make you sleep.  Sometimes a breathing machine is needed only for a short time. For instance, some people need the breathing machine just while they recover from a lung infection. When deciding about a breathing tube, consider whether you would want it at all, want it only for a short time, or want it no matter what. Also, keep in mind that any time a breathing machine is used, it is hard to know for sure if and when it will be able to be disconnected.  Cardiopulmonary resuscitation (CPR) - If your heart stops beating suddenly, doctors might be able to restart it by pumping on your chest, putting in a breathing tube and pushing air into your lungs, giving you an electric shock (called "defibrillation"), and/or giving you special medicines. Some people recover completely after having their heart restarted. Others have permanent brain damage from a lack of blood flow to the brain; this is most likely in people who have an advanced, serious illness.  Feeding tubes - If you become unable to eat, you can have a tube put into your stomach or intestines that can deliver nutrients. A feeding tube can keep a person's body going while they heal and gets strong. But it can also keep a person alive for a long time even if there is no chance the person will recover.  Can I change my mind? -- Yes. You can change your mind at any time. If you sign an advance directive and you decide you want a different kind of treatment or you no longer want the health care proxy you chose, all you have to do is tell your doctor or nurse about your new decision. If you want to name a " new health care proxy or want to record new wishes, you can draw up new documents.  How can I draw up an advance directive? -- The following table lists resources that can help you learn more about making your own advance directives (table 1).  All topics are updated as new evidence becomes available and our peer review process is complete.  This topic retrieved from [employer] on: Sep 21, 2021.  Topic 75981 Version 13.0  Release: 29.4.2 - C29.263  © 2021 UpToDate, Inc. and/or its affiliates. All rights reserved.  table 1: Resources that can help you make advance directives     Address  Phone number  Website    [facility]  [address] [place] [address] Toll-free: (553) [facility]  [phone]] http://assets.[facility].org/external_sites/ caregiving/multimedia/EG_AdvanceDirectives.html    Aging with Dignity (Five Wishes form)  [address] [place] [address] Toll-free: (770) 5WISHES  [phone]] www.agingwithdignity.org    CaringInfo    Toll-free: [phone] [phone] www.caringinfo.org    [facility] POLST [employer]  c/o [employer]  [address] [place] [address] [phone] www.polst.org    Graphic 13612 Version 7.0  Consumer Information Use and Disclaimer   This information is not specific medical advice and does not replace information you receive from your health care provider. This is only a brief summary of general information. It does NOT include all information about conditions, illnesses, injuries, tests, procedures, treatments, therapies, discharge instructions or life-style choices that may apply to you. You must talk with your health care provider for complete information about your health and treatment options. This information should not be used to decide whether or not to accept your health care provider's advice, instructions or recommendations. Only your health care provider has the knowledge and training to provide advice that is right for you. The use of  this information is governed by the [employer] End User License Agreement, available at https://www.[employer].Roobiq/en/solutions/[employer]/about/eduarda.The use of [employer] content is governed by the [employer] Terms of Use. ©2021 UpToDate, Inc. All rights reserved.  Copyright   © 2021 UpToDate, Inc. and/or its affiliates. All rights reserved.

## 2023-06-21 NOTE — PROGRESS NOTES
Patient ID: [id number]     Chief Complaint: Medicare AWV      HPI:     Kari Gomez is a 78 y.o. female here today for a Medicare Wellness. She did have COVID last month. Since here she went to Urgent care and they wanted her to recheck with dermatology. She said it was ok. They had seen fluid in ear and she went to ENT and it was showing same so they want on nasal spray.       Opioid Screening: Patient medication list reviewed, patient is not taking prescription opioids. Patient is not using additional opioids than prescribed. Patient is at low risk of substance abuse based on this opioid use history.       Past Medical History:   Diagnosis Date    Allergic sinusitis     Arthritis     Combined hyperlipidemia     Disc degeneration, lumbar     Diverticula of colon     DJD (degenerative joint disease)     External hemorrhoid     Fracture, fibula     Headache, chronic migraine without aura     HTN (hypertension)     Hypothyroidism     Macular degeneration     Neuropathy     Vitamin D deficiency         Past Surgical History:   Procedure Laterality Date    BREAST BIOPSY      BUNIONECTOMY      CATARACT EXTRACTION, BILATERAL      EYE SURGERY      MACULAR DEGENERATION    FRACTURE SURGERY Left 2021    OPEN REDUCTION AND INTERNAL FIXATION (ORIF) OF FRACTURE OF DISTAL RADIUS  10/01/2021    OPEN REDUCTION AND INTERNAL FIXATION (ORIF) OF INJURY OF ANKLE Right 02/01/2019       Review of patient's allergies indicates:   Allergen Reactions    Meperidine      Other reaction(s): doctor said pt is allergic  Other reaction(s): Not Indicated       Outpatient Medications Marked as Taking for the 6/21/23 encounter (Office Visit) with Latanya Taylor MD   Medication Sig Dispense Refill    acetaminophen (TYLENOL ARTHRITIS PAIN ORAL) Take by mouth daily as needed.      alendronate (FOSAMAX) 70 MG tablet TAKE ONE TABLET BY MOUTH ONCE A WEEK 4 tablet 4    ascorbic acid (VITAMIN C ORAL) Take by mouth.      benzonatate (TESSALON) 200  MG capsule TAKE ONE CAPSULE BY MOUTH THREE TIMES A DAY 30 capsule 5    calcium carbonate/vitamin D3 (VITAMIN D-3 ORAL) Take by mouth.      CALCIUM-MAGNESIUM ORAL Take by mouth once daily at 6am.      cholecalciferol, vitamin D3, (VITAMIN D3) 50 mcg (2,000 unit) Cap capsule Take 2,000 Units by mouth once daily.      cyanocobalamin, vitamin B-12, (VITAMIN B-12 ORAL) Take by mouth once daily at 6am.      diltiaZEM (CARDIZEM CD) 240 MG 24 hr capsule TAKE ONE CAPSULE BY MOUTH DAILY 30 capsule 5    docusate sodium (COLACE) 100 MG capsule Take 100 mg by mouth 2 (two) times daily.      DULoxetine (CYMBALTA) 30 MG capsule TAKE ONE CAPSULE BY MOUTH DAILY FOR NEUROPATHY AND MOOD DO NOT CRUSH OR CHEW 30 capsule 11    ECHINACEA 1X ORAL Take by mouth once daily at 6am.      ferrous fumarate/vit Bcomp,C (SUPER B COMPLEX ORAL) Take by mouth.      fluticasone propionate (FLONASE) 50 mcg/actuation nasal spray 1 spray by Each Nostril route 2 (two) times daily.      hydroCHLOROthiazide (HYDRODIURIL) 12.5 MG Tab TAKE ONE TABLET BY MOUTH DAILY 30 tablet 2    ipratropium (ATROVENT) 42 mcg (0.06 %) nasal spray SMARTSI Spray(s) By Mouth 3 Times Daily PRN      ketorolac (TORADOL) 10 mg tablet TAKE ONE TABLET BY MOUTH EVERY FOUR HOURS AS NEEDED FOR PAIN. MAX OF 4 PER DAY FOR FIVE DAYS 20 tablet 1    levothyroxine (SYNTHROID) 125 MCG tablet TAKE ONE TABLET BY MOUTH ONCE DAILY 30 tablet 9    LYSINE ORAL Take by mouth once daily at 6am.      magnesium salicylate (DOANS PILLS ORAL) Take by mouth.      montelukast (SINGULAIR) 10 mg tablet TAKE ONE TABLET BY MOUTH DAILY 30 tablet 11    multivit/folic acid/vit K1 (ONE-A-DAY WOMEN'S 50 PLUS ORAL) Take by mouth 2 (two) times a day.      mv-mn/iron/folic acid/herb 190 (VITAMIN D3 COMPLETE ORAL) Vitamin D      polyethylene glycol 3350 (MIRALAX ORAL) Take by mouth.      promethazine-dextromethorphan (PROMETHAZINE-DM) 6.25-15 mg/5 mL Syrp SMARTSI Milliliter(s) By Mouth Every 12 Hours  mL 1     RED YEAST RICE ORAL Take by mouth 2 (two) times a day.      sumatriptan (IMITREX) 100 MG tablet TAKE ONE TABLET BY MOUTH DAILY AS NEEDED FOR MIGRAINE HEADACHE (MAY REPEAT DOSE AFTER 2 HOURS UP TO A MAXIMUM  MG IN 24 HOURS) 9 tablet 3    UNABLE TO FIND Take 1 capsule by mouth once daily. medication name: K+2 Potassium      UNABLE TO FIND Take 1 capsule by mouth once daily. medication name: K+2 Potassium      vit A/vit C/vit E/zinc/copper (PRESERVISION AREDS ORAL) PreserVision AREDS         Social History     Socioeconomic History    Marital status: Single   Tobacco Use    Smoking status: Never    Smokeless tobacco: Never   Substance and Sexual Activity    Alcohol use: Never    Drug use: Never    Sexual activity: Not Currently     Social Determinants of Health     Financial Resource Strain: Low Risk     Difficulty of Paying Living Expenses: Not hard at all   Food Insecurity: No Food Insecurity    Worried About Running Out of Food in the Last Year: Never true    Ran Out of Food in the Last Year: Never true   Transportation Needs: No Transportation Needs    Lack of Transportation (Medical): No    Lack of Transportation (Non-Medical): No   Physical Activity: Sufficiently Active    Days of Exercise per Week: 5 days    Minutes of Exercise per Session: 30 min   Stress: No Stress Concern Present    Feeling of Stress : Not at all   Social Connections: Moderately Integrated    Frequency of Communication with Friends and Family: More than three times a week    Frequency of Social Gatherings with Friends and Family: Three times a week    Attends Orthodoxy Services: More than 4 times per year    Active Member of Clubs or Organizations: Yes    Attends Club or Organization Meetings: 1 to 4 times per year    Marital Status: Never    Housing Stability: Low Risk     Unable to Pay for Housing in the Last Year: No    Number of Places Lived in the Last Year: 1    Unstable Housing in the Last Year: No        Family History  "  Problem Relation Age of Onset    Hyperlipidemia Mother     Lung cancer Mother     COPD Mother     Stroke Father     Hyperlipidemia Father     Glaucoma Paternal Aunt     Breast cancer Maternal Grandmother         Patient Care Team:  Latanya Taylor MD as PCP - General (Internal Medicine)  Marisol Griffin DPM as Consulting Physician (Podiatry)  João Cuenca MD as Consulting Physician (Otolaryngology)  Haroon Monreal MD as Consulting Physician (Orthopedic Surgery)  Keyon Hernandez MD as Consulting Physician (Ophthalmology)  Kelvin Matias MD as Consulting Physician (Otolaryngology)       Subjective:     Review of Systems   Constitutional:  Positive for malaise/fatigue. Negative for chills and fever.        Falls asleep in afternoon, it's since COVID   HENT:  Positive for congestion and sore throat.         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    Eyes:         Vision has been holding   Respiratory:  Positive for cough. Negative for sputum production and wheezing.    Cardiovascular: Negative.    Gastrointestinal:  Negative for diarrhea, nausea and vomiting.   Genitourinary:  Negative for dysuria and frequency.   Musculoskeletal:  Positive for myalgias.   Neurological:  Positive for sensory change. Negative for dizziness and headaches.   Psychiatric/Behavioral:  Negative for depression. The patient is not nervous/anxious.        Patient Reported Health Risk Assessment       Objective:     /71 (BP Location: Left arm, Patient Position: Sitting, BP Method: Large (Automatic))   Pulse 70   Temp 98.2 °F (36.8 °C) (Oral)   Resp 18   Ht 5' 6" (1.676 m)   Wt 97.5 kg (215 lb)   SpO2 97%   BMI 34.70 kg/m²     Physical Exam  Vitals reviewed.   Constitutional:       Appearance: She is obese. She is not ill-appearing.   HENT:      Head: Normocephalic and atraumatic.      Right Ear: Tympanic membrane, ear canal and external ear " normal.      Left Ear: Tympanic membrane, ear canal and external ear normal.      Mouth/Throat:      Mouth: Mucous membranes are moist.      Pharynx: No oropharyngeal exudate or posterior oropharyngeal erythema.   Eyes:      General: No scleral icterus.     Pupils: Pupils are equal, round, and reactive to light.   Cardiovascular:      Rate and Rhythm: Regular rhythm.      Heart sounds: Murmur heard.     No gallop.   Pulmonary:      Effort: Pulmonary effort is normal.      Breath sounds: Normal breath sounds. No wheezing or rales.   Abdominal:      Palpations: Abdomen is soft.      Tenderness: There is no abdominal tenderness. There is no guarding.   Musculoskeletal:         General: Tenderness present.      Cervical back: No rigidity.      Comments: Bracing on the left ankle   Lymphadenopathy:      Cervical: No cervical adenopathy.   Skin:     General: Skin is warm and dry.      Coloration: Skin is pale.   Neurological:      Mental Status: She is alert and oriented to person, place, and time. Mental status is at baseline.      Motor: Weakness present.      Gait: Gait abnormal.   Psychiatric:         Mood and Affect: Mood normal.         Behavior: Behavior normal.         Thought Content: Thought content normal.         Judgment: Judgment normal.         No flowsheet data found.  Fall Risk Assessment - Outpatient 6/21/2023 3/21/2023 12/20/2022 7/26/2022 6/21/2022   Mobility Status Ambulatory w/ assistance Ambulatory Ambulatory w/ assistance - Ambulatory w/ assistance   Number of falls 0 1 1 0 0   Identified as fall risk 1 0 1 0 0              Lab Visit on 06/19/2023   Component Date Value    Sodium Level 06/19/2023 140     Potassium Level 06/19/2023 3.9     Chloride 06/19/2023 102     Carbon Dioxide 06/19/2023 29     Glucose Level 06/19/2023 122 (H)     Blood Urea Nitrogen 06/19/2023 18.3     Creatinine 06/19/2023 0.71     Calcium Level Total 06/19/2023 10.0     Protein Total 06/19/2023 6.4     Albumin Level  06/19/2023 3.8     Globulin 06/19/2023 2.6     Albumin/Globulin Ratio 06/19/2023 1.5     Bilirubin Total 06/19/2023 1.0     Alkaline Phosphatase 06/19/2023 88     Alanine Aminotransferase 06/19/2023 22     Aspartate Aminotransfera* 06/19/2023 24     eGFR 06/19/2023 >60     Ferritin Level 06/19/2023 19.10     WBC 06/19/2023 6.86     RBC 06/19/2023 4.51     Hgb 06/19/2023 12.4     Hct 06/19/2023 40.7     MCV 06/19/2023 90.2     MCH 06/19/2023 27.5     MCHC 06/19/2023 30.5 (L)     RDW 06/19/2023 14.4     Platelet 06/19/2023 222     MPV 06/19/2023 9.5     Neut % 06/19/2023 51.6     Lymph % 06/19/2023 37.2     Mono % 06/19/2023 7.6     Eos % 06/19/2023 3.2     Basophil % 06/19/2023 0.3     Lymph # 06/19/2023 2.55     Neut # 06/19/2023 3.54     Mono # 06/19/2023 0.52     Eos # 06/19/2023 0.22     Baso # 06/19/2023 0.02     IG# 06/19/2023 0.01     IG% 06/19/2023 0.1      Lab Results   Component Value Date    HGBA1C 5.5 03/16/2023       Assessment/Plan:     1. Medicare annual wellness visit, subsequent  -     (In Office Administered) Pneumococcal Conjugate Vaccine (20 Valent) (IM)    2. Essential hypertension  Comments:  Continue diltiazem 240mg  Orders:  -     Comprehensive Metabolic Panel; Future; Expected date: 09/18/2023  -     Lipid Panel; Future; Expected date: 09/18/2023    3. Controlled type 2 diabetes mellitus without complication, without long-term current use of insulin  Comments:  A1C in normal range, doing great on diet alone  Orders:  -     Comprehensive Metabolic Panel; Future; Expected date: 09/18/2023  -     Hemoglobin A1C; Future; Expected date: 09/18/2023  -     Lipid Panel; Future; Expected date: 09/18/2023    4. Acquired hypothyroidism  Comments:  Just did lab with illness will do in 3 months  Orders:  -     TSH; Future; Expected date: 09/18/2023    5. Exudative age-related macular degeneration of left eye, unspecified stage  Comments:  Has been stable, sees opthamology every 3 mo    6. Need for  pneumococcal vaccination  -     (In Office Administered) Pneumococcal Conjugate Vaccine (20 Valent) (IM)    7. Post-COVID chronic cough  Comments:  Try Duricef since worsened in last week    8. Encounter for hepatitis C screening test for low risk patient  -     Hepatitis C Antibody; Future; Expected date: 09/18/2023    Other orders  -     cefadroxil (DURICEF) 500 MG Cap; Take 1 capsule (500 mg total) by mouth every 12 (twelve) hours. for 10 days  Dispense: 20 capsule; Refill: 0           Medicare Annual Wellness and Personalized Prevention Plan:   Fall Risk + Home Safety + Hearing Impairment + Depression Screen + Opioid and Substance Abuse Screening + Cognitive Impairment Screen + Health Risk Assessment all reviewed.     Health Maintenance Topics with due status: Not Due       Topic Last Completion Date    Influenza Vaccine 11/02/2020    DEXA Scan 01/11/2022    Lipid Panel 07/28/2022    Eye Exam 02/16/2023    Hemoglobin A1c 03/16/2023      The patient's Health Maintenance was reviewed and the following appears to be due at this time:   Health Maintenance Due   Topic Date Due    Hepatitis C Screening  Never done    TETANUS VACCINE  Never done    Shingles Vaccine (2 of 3) 02/13/2017    COVID-19 Vaccine (3 - Pfizer series) 04/14/2021    Diabetes Urine Screening  07/28/2023       Advance Care Planning   I attest to discussing Advance Care Planning with patient and/or family member.  Education was provided including the importance of the Health Care Power of , Advance Directives, and/or [place] documentation.  The patient expressed understanding to the importance of this information and discussion.   Advance Care Planning     Date: 06/21/2023  She has a living will and we note in [employer] we scanned in but I can't find it. She'll try to bring me a copy. She's done all her paperwork with  since she doesn't have children. Discussion 8 min.           Follow up in about 3 months (around 9/21/2023) for  Medication Managment. In addition to their scheduled follow up, the patient has also been instructed to follow up on as needed basis.

## 2023-06-23 ENCOUNTER — DOCUMENTATION ONLY (OUTPATIENT)
Dept: PRIMARY CARE CLINIC | Facility: CLINIC | Age: 78
End: 2023-06-23
Payer: MEDICARE

## 2023-06-27 RX ORDER — ALENDRONATE SODIUM 70 MG/1
TABLET ORAL
Qty: 4 TABLET | Refills: 4 | Status: SHIPPED | OUTPATIENT
Start: 2023-06-27 | End: 2023-11-14

## 2023-07-06 ENCOUNTER — TELEPHONE (OUTPATIENT)
Dept: PRIMARY CARE CLINIC | Facility: CLINIC | Age: 78
End: 2023-07-06
Payer: MEDICARE
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
## 2023-07-06 DIAGNOSIS — M25.511 ACUTE PAIN OF RIGHT SHOULDER: Primary | ICD-10-CM

## 2023-07-06 NOTE — TELEPHONE ENCOUNTER
----- Message from Ralph Mon sent at 7/6/2023  9:34 AM CDT -----  Regarding: call back  .Type:  Needs Medical Advice    Who Called: hieu  Symptoms (please be specific): right shoulder pain   How long has patient had these symptoms:  Saturday  Would the patient rather a call back or a response via [facility]?   Best Call Back Number: [phone]  Additional Information: Pt states she fell Saturday morning and her right shoulder has been hurting and she doesn't think she broke anything . She wants to get a order for a x -ray pls call back

## 2023-07-12 ENCOUNTER — HOSPITAL ENCOUNTER (OUTPATIENT)
Dept: RADIOLOGY | Facility: HOSPITAL | Age: 78
Discharge: HOME | End: 2023-07-12
Attending: INTERNAL MEDICINE
Payer: MEDICARE

## 2023-07-12 DIAGNOSIS — M25.511 ACUTE PAIN OF RIGHT SHOULDER: ICD-10-CM
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
## 2023-07-12 PROCEDURE — 73030 X-RAY EXAM OF SHOULDER: CPT | Mod: TC,RT

## 2023-07-24 ENCOUNTER — TELEPHONE (OUTPATIENT)
Dept: PRIMARY CARE CLINIC | Facility: CLINIC | Age: 78
End: 2023-07-24
Payer: MEDICARE

## 2023-07-24 RX ORDER — CEPHALEXIN 500 MG/1
500 CAPSULE ORAL EVERY 8 HOURS
Qty: 30 CAPSULE | Refills: 0 | Status: SHIPPED | OUTPATIENT
Start: 2023-07-24 | End: 2023-08-03

## 2023-07-24 NOTE — TELEPHONE ENCOUNTER
----- Message from Ralph Mon sent at 7/24/2023 10:52 AM CDT -----  Regarding: call back  .Type:  Needs Medical Advice    Who Called: anna  Symptoms (please be specific): boil   Would the patient rather a call back or a response via [facility]?   Best Call Back Number: [phone]   Additional Information: pt states she has a boil and want to be prescribed something for it.

## 2023-09-19 ENCOUNTER — TELEPHONE (OUTPATIENT)
Dept: PRIMARY CARE CLINIC | Facility: CLINIC | Age: 78
End: 2023-09-19
Payer: MEDICARE

## 2023-09-20 ENCOUNTER — LAB VISIT (OUTPATIENT)
Dept: LAB | Facility: HOSPITAL | Age: 78
End: 2023-09-20
Attending: INTERNAL MEDICINE
Payer: MEDICARE

## 2023-09-20 DIAGNOSIS — I10 ESSENTIAL HYPERTENSION: ICD-10-CM

## 2023-09-20 DIAGNOSIS — E03.9 ACQUIRED HYPOTHYROIDISM: ICD-10-CM

## 2023-09-20 DIAGNOSIS — E11.9 CONTROLLED TYPE 2 DIABETES MELLITUS WITHOUT COMPLICATION, WITHOUT LONG-TERM CURRENT USE OF INSULIN: ICD-10-CM

## 2023-09-20 DIAGNOSIS — Z11.59 ENCOUNTER FOR HEPATITIS C SCREENING TEST FOR LOW RISK PATIENT: ICD-10-CM

## 2023-09-20 LAB
ALBUMIN SERPL-MCNC: 3.6 G/DL (ref 3.4–4.8)
ALBUMIN/GLOB SERPL: 1.2 RATIO (ref 1.1–2)
ALP SERPL-CCNC: 88 UNIT/L (ref 40–150)
ALT SERPL-CCNC: 27 UNIT/L (ref 0–55)
AST SERPL-CCNC: 22 UNIT/L (ref 5–34)
BILIRUB SERPL-MCNC: 0.8 MG/DL
BUN SERPL-MCNC: 16.9 MG/DL (ref 9.8–20.1)
CALCIUM SERPL-MCNC: 9.5 MG/DL (ref 8.4–10.2)
CHLORIDE SERPL-SCNC: 107 MMOL/L (ref 98–107)
CHOLEST SERPL-MCNC: 195 MG/DL
CHOLEST/HDLC SERPL: 4 {RATIO} (ref 0–5)
CO2 SERPL-SCNC: 25 MMOL/L (ref 23–31)
CREAT SERPL-MCNC: 0.71 MG/DL (ref 0.55–1.02)
EST. AVERAGE GLUCOSE BLD GHB EST-MCNC: 119.8 MG/DL
GFR SERPLBLD CREATININE-BSD FMLA CKD-EPI: >60 MLS/MIN/1.73/M2
GLOBULIN SER-MCNC: 2.9 GM/DL (ref 2.4–3.5)
GLUCOSE SERPL-MCNC: 115 MG/DL (ref 82–115)
HBA1C MFR BLD: 5.8 %
HCV AB SERPL QL IA: NONREACTIVE
HDLC SERPL-MCNC: 55 MG/DL (ref 35–60)
LDLC SERPL CALC-MCNC: 126 MG/DL (ref 50–140)
POTASSIUM SERPL-SCNC: 4.1 MMOL/L (ref 3.5–5.1)
PROT SERPL-MCNC: 6.5 GM/DL (ref 5.8–7.6)
SODIUM SERPL-SCNC: 138 MMOL/L (ref 136–145)
TRIGL SERPL-MCNC: 71 MG/DL (ref 37–140)
TSH SERPL-ACNC: 2.97 UIU/ML (ref 0.35–4.94)
VLDLC SERPL CALC-MCNC: 14 MG/DL

## 2023-09-20 PROCEDURE — 36415 COLL VENOUS BLD VENIPUNCTURE: CPT

## 2023-09-20 PROCEDURE — 83036 HEMOGLOBIN GLYCOSYLATED A1C: CPT

## 2023-09-20 PROCEDURE — 86803 HEPATITIS C AB TEST: CPT

## 2023-09-20 PROCEDURE — 84443 ASSAY THYROID STIM HORMONE: CPT

## 2023-09-20 PROCEDURE — 80061 LIPID PANEL: CPT

## 2023-09-20 PROCEDURE — 80053 COMPREHEN METABOLIC PANEL: CPT

## 2023-09-25 NOTE — PROGRESS NOTES
Latanya Talyor MD   [address] [place] [address]     Patient ID: [id number]     Chief Complaint: 3 Months follow up for HTN/DM        HPI:     Kari Gomez is a 78 y.o. female here today for a follow up of HTN and DM. She has had two falls having to wear her boot again on the left foot. She fell in January and hit her chin, she is noting since then she notes the saliva leaks down both sides. She fell in house and hit her right chest. She also had right ankle sprain with fall. She went to see her podiatrist and got injection into the side of her foot since she was walking on the side of her foot. Now she is in her boot.  No other complaints today.       Subjective:     Review of Systems   Constitutional: Negative.    Respiratory: Negative.     Cardiovascular: Negative.        Past Medical History:   Diagnosis Date    Allergic sinusitis     Arthritis     Combined hyperlipidemia     Disc degeneration, lumbar     Diverticula of colon     DJD (degenerative joint disease)     External hemorrhoid     Fracture, fibula     Headache, chronic migraine without aura     HTN (hypertension)     Hypothyroidism     Macular degeneration     Neuropathy     Vitamin D deficiency         Past Surgical History:   Procedure Laterality Date    BREAST BIOPSY      BUNIONECTOMY      CATARACT EXTRACTION, BILATERAL      EYE SURGERY      MACULAR DEGENERATION    FRACTURE SURGERY Left 2021    OPEN REDUCTION AND INTERNAL FIXATION (ORIF) OF FRACTURE OF DISTAL RADIUS  10/01/2021    OPEN REDUCTION AND INTERNAL FIXATION (ORIF) OF INJURY OF ANKLE Right 02/01/2019       Family History   Problem Relation Age of Onset    Hyperlipidemia Mother     Lung cancer Mother     COPD Mother     Stroke Father     Hyperlipidemia Father     Glaucoma Paternal Aunt     Breast cancer Maternal Grandmother         Social History     Socioeconomic History    Marital status: Single   Tobacco Use    Smoking status: Never    Smokeless tobacco: Never   Substance and  Sexual Activity    Alcohol use: Never    Drug use: Never    Sexual activity: Not Currently     Social Determinants of Health     Financial Resource Strain: Low Risk  (6/21/2023)    Overall Financial Resource Strain (CARDIA)     Difficulty of Paying Living Expenses: Not hard at all   Food Insecurity: No Food Insecurity (6/21/2023)    Hunger Vital Sign     Worried About Running Out of Food in the Last Year: Never true     Ran Out of Food in the Last Year: Never true   Transportation Needs: No Transportation Needs (6/21/2023)    PRAPARE - Transportation     Lack of Transportation (Medical): No     Lack of Transportation (Non-Medical): No   Physical Activity: Sufficiently Active (6/21/2023)    Exercise Vital Sign     Days of Exercise per Week: 5 days     Minutes of Exercise per Session: 30 min   Stress: No Stress Concern Present (6/21/2023)    [employer] - Occupational Stress Questionnaire     Feeling of Stress : Not at all   Social Connections: Moderately Integrated (6/21/2023)    Social Connection and Isolation Panel [NHANES]     Frequency of Communication with Friends and Family: More than three times a week     Frequency of Social Gatherings with Friends and Family: Three times a week     Attends [employer] Services: More than 4 times per year     Active Member of Clubs or Organizations: Yes     Attends Club or Organization Meetings: 1 to 4 times per year     Marital Status: Never    Housing Stability: Low Risk  (6/21/2023)    Housing Stability Vital Sign     Unable to Pay for Housing in the Last Year: No     Number of Places Lived in the Last Year: 1     Unstable Housing in the Last Year: No       Review of patient's allergies indicates:   Allergen Reactions    Meperidine      Other reaction(s): doctor said pt is allergic  Other reaction(s): Not Indicated       Outpatient Medications Marked as Taking for the 9/26/23 encounter (Office Visit) with Latanya Taylor MD   Medication  Sig Dispense Refill    acetaminophen (TYLENOL ARTHRITIS PAIN ORAL) Take by mouth daily as needed.      alendronate (FOSAMAX) 70 MG tablet TAKE ONE TABLET BY MOUTH ONCE A WEEK 4 tablet 4    ascorbic acid (VITAMIN C ORAL) Take by mouth.      calcium carbonate/vitamin D3 (VITAMIN D-3 ORAL) Take by mouth.      cholecalciferol, vitamin D3, (VITAMIN D3) 50 mcg (2,000 unit) Cap capsule Take 4,000 Units by mouth once daily.      cyanocobalamin, vitamin B-12, (VITAMIN B-12 ORAL) Take by mouth once daily at 6am.      docusate sodium (COLACE) 100 MG capsule Take 100 mg by mouth 2 (two) times daily.      ECHINACEA 1X ORAL Take by mouth once daily at 6am.      gabapentin (NEURONTIN) 100 MG capsule Take 1 capsule (100 mg total) by mouth 3 (three) times daily. 90 capsule 2    ipratropium (ATROVENT) 42 mcg (0.06 %) nasal spray SMARTSI Spray(s) By Mouth 3 Times Daily PRN      ketorolac (TORADOL) 10 mg tablet TAKE ONE TABLET BY MOUTH EVERY FOUR HOURS AS NEEDED FOR PAIN. MAX OF 4 PER DAY FOR FIVE DAYS 20 tablet 1    LYSINE ORAL Take by mouth 2 (two) times a day.      montelukast (SINGULAIR) 10 mg tablet TAKE ONE TABLET BY MOUTH DAILY 30 tablet 11    multivit/folic acid/vit K1 (ONE-A-DAY WOMEN'S 50 PLUS ORAL) Take by mouth 2 (two) times a day.      polyethylene glycol 3350 (MIRALAX ORAL) Take by mouth.      RED YEAST RICE ORAL Take by mouth 2 (two) times a day.      sumatriptan (IMITREX) 100 MG tablet TAKE ONE TABLET BY MOUTH DAILY AS NEEDED FOR MIGRAINE HEADACHE (MAY REPEAT DOSE AFTER 2 HOURS UP TO A MAXIMUM  MG IN 24 HOURS) 9 tablet 3    UNABLE TO FIND Take 1 capsule by mouth once daily. medication name: K+2 Potassium      vit A/vit C/vit E/zinc/copper (PRESERVISION AREDS ORAL) PreserVision AREDS      [DISCONTINUED] benzonatate (TESSALON) 200 MG capsule TAKE ONE CAPSULE BY MOUTH THREE TIMES A DAY 30 capsule 5    [DISCONTINUED] diltiaZEM (CARDIZEM CD) 240 MG 24 hr capsule TAKE ONE CAPSULE BY MOUTH DAILY 30 capsule 5     "[DISCONTINUED] DULoxetine (CYMBALTA) 30 MG capsule TAKE ONE CAPSULE BY MOUTH DAILY FOR NEUROPATHY AND MOOD DO NOT CRUSH OR CHEW 30 capsule 11    [DISCONTINUED] hydroCHLOROthiazide (HYDRODIURIL) 12.5 MG Tab TAKE ONE TABLET BY MOUTH DAILY (Patient taking differently: every other day.) 30 tablet 2    [DISCONTINUED] levothyroxine (SYNTHROID) 125 MCG tablet TAKE ONE TABLET BY MOUTH ONCE DAILY 30 tablet 9       Patient Care Team:  Latanya Taylor MD as PCP - General (Internal Medicine)  Marisol Griffin DPM as Consulting Physician (Podiatry)  João Cuenca MD as Consulting Physician (Otolaryngology)  Haroon Monreal MD as Consulting Physician (Orthopedic Surgery)  Keyon Hernandez MD as Consulting Physician (Ophthalmology)  Kelvin Matias MD as Consulting Physician (Otolaryngology)       Objective:     /75   Pulse 72   Temp 97.4 °F (36.3 °C) (Oral)   Resp 16   Ht 5' 6" (1.676 m)   Wt 97.5 kg (215 lb)   SpO2 96%   BMI 34.70 kg/m²     Physical Exam  Vitals reviewed.   HENT:      Mouth/Throat:      Mouth: Mucous membranes are moist.      Pharynx: No posterior oropharyngeal erythema.   Eyes:      General: No scleral icterus.     Conjunctiva/sclera: Conjunctivae normal.      Pupils: Pupils are equal, round, and reactive to light.   Cardiovascular:      Rate and Rhythm: Normal rate and regular rhythm.   Pulmonary:      Effort: Pulmonary effort is normal.      Breath sounds: Normal breath sounds.   Musculoskeletal:         General: Tenderness present.   Skin:     General: Skin is warm and dry.      Coloration: Skin is pale.   Neurological:      Mental Status: She is alert.   Psychiatric:         Mood and Affect: Mood normal.         Behavior: Behavior normal.         Thought Content: Thought content normal.         Judgment: Judgment normal.      Comments: Focused against government and vaccines now           Lab Visit on 09/20/2023   Component Date Value    Sodium Level 09/20/2023 138     " Potassium Level 09/20/2023 4.1     Chloride 09/20/2023 107     Carbon Dioxide 09/20/2023 25     Glucose Level 09/20/2023 115     Blood Urea Nitrogen 09/20/2023 16.9     Creatinine 09/20/2023 0.71     Calcium Level Total 09/20/2023 9.5     Protein Total 09/20/2023 6.5     Albumin Level 09/20/2023 3.6     Globulin 09/20/2023 2.9     Albumin/Globulin Ratio 09/20/2023 1.2     Bilirubin Total 09/20/2023 0.8     Alkaline Phosphatase 09/20/2023 88     Alanine Aminotransferase 09/20/2023 27     Aspartate Aminotransfera* 09/20/2023 22     eGFR 09/20/2023 >60     Hemoglobin A1c 09/20/2023 5.8     Estimated Average Glucose 09/20/2023 119.8     Hep C Ab Interp 09/20/2023 Nonreactive     Cholesterol Total 09/20/2023 195     HDL Cholesterol 09/20/2023 55     Triglyceride 09/20/2023 71     Cholesterol/HDL Ratio 09/20/2023 4     Very Low Density Lipopro* 09/20/2023 14     LDL Cholesterol 09/20/2023 126.00     Thyroid Stimulating Horm* 09/20/2023 2.973          Assessment/Plan:     1. Essential hypertension  Comments:  Doing well, trying to watch salt, the chips are hard to find for her football games.     2. Controlled type 2 diabetes mellitus without complication, without long-term current use of insulin  Comments:  A1C at 5.8, great control with just diet     3. Acquired hypothyroidism  Comments:  Levels good, refilled current dose    4. Recurrent falls  Comments:  Went to Urgent Care on Labor Day    Other orders  -     benzonatate (TESSALON) 200 MG capsule; Take 1 capsule (200 mg total) by mouth 3 (three) times daily as needed for Cough.  Dispense: 30 capsule; Refill: 5  -     diltiaZEM (CARDIZEM CD) 240 MG 24 hr capsule; Take 1 capsule (240 mg total) by mouth once daily.  Dispense: 30 capsule; Refill: 5  -     DULoxetine (CYMBALTA) 30 MG capsule; Take 1 capsule (30 mg total) by mouth once daily. For neuropathy and mood  Dispense: 30 capsule; Refill: 11  -     hydroCHLOROthiazide (HYDRODIURIL) 12.5 MG Tab; Take 1 tablet (12.5 mg  total) by mouth every other day.  Dispense: 30 tablet; Refill: 5  -     levothyroxine (SYNTHROID) 125 MCG tablet; Take 1 tablet (125 mcg total) by mouth once daily.  Dispense: 30 tablet; Refill: 9             Follow up in about 3 months (around 12/26/2023) for Falls. In addition to their scheduled follow up, the patient has also been instructed to follow up on as needed basis.     Signature:  Latanya Taylor MD  [facility]  [id number] [id number]

## 2023-09-26 ENCOUNTER — OFFICE VISIT (OUTPATIENT)
Dept: PRIMARY CARE CLINIC | Facility: CLINIC | Age: 78
End: 2023-09-26
Payer: MEDICARE

## 2023-09-26 VITALS
SYSTOLIC BLOOD PRESSURE: 130 MMHG | OXYGEN SATURATION: 96 % | DIASTOLIC BLOOD PRESSURE: 75 MMHG | RESPIRATION RATE: 16 BRPM | HEIGHT: 66 IN | HEART RATE: 72 BPM | BODY MASS INDEX: 34.55 KG/M2 | WEIGHT: 215 LBS | TEMPERATURE: 97 F

## 2023-09-26 DIAGNOSIS — E78.5 DYSLIPIDEMIA: ICD-10-CM

## 2023-09-26 DIAGNOSIS — R29.6 RECURRENT FALLS: ICD-10-CM

## 2023-09-26 DIAGNOSIS — E03.9 ACQUIRED HYPOTHYROIDISM: ICD-10-CM

## 2023-09-26 DIAGNOSIS — E11.9 CONTROLLED TYPE 2 DIABETES MELLITUS WITHOUT COMPLICATION, WITHOUT LONG-TERM CURRENT USE OF INSULIN: ICD-10-CM

## 2023-09-26 DIAGNOSIS — I10 ESSENTIAL HYPERTENSION: Primary | ICD-10-CM

## 2023-09-26 PROCEDURE — 99214 PR OFFICE/OUTPT VISIT, EST, LEVL IV, 30-39 MIN: ICD-10-PCS | Mod: ,,, | Performed by: INTERNAL MEDICINE

## 2023-09-26 PROCEDURE — 99214 OFFICE O/P EST MOD 30 MIN: CPT | Mod: ,,, | Performed by: INTERNAL MEDICINE

## 2023-09-26 RX ORDER — DILTIAZEM HYDROCHLORIDE 240 MG/1
240 CAPSULE, COATED, EXTENDED RELEASE ORAL DAILY
Qty: 30 CAPSULE | Refills: 5 | Status: SHIPPED | OUTPATIENT
Start: 2023-09-26

## 2023-09-26 RX ORDER — DULOXETIN HYDROCHLORIDE 30 MG/1
30 CAPSULE, DELAYED RELEASE ORAL DAILY
Qty: 30 CAPSULE | Refills: 11 | Status: SHIPPED | OUTPATIENT
Start: 2023-09-26

## 2023-09-26 RX ORDER — BENZONATATE 200 MG/1
200 CAPSULE ORAL 3 TIMES DAILY PRN
Qty: 30 CAPSULE | Refills: 5 | Status: SHIPPED | OUTPATIENT
Start: 2023-09-26 | End: 2024-02-28 | Stop reason: ALTCHOICE

## 2023-09-26 RX ORDER — LEVOTHYROXINE SODIUM 125 UG/1
125 TABLET ORAL DAILY
Qty: 30 TABLET | Refills: 9 | Status: SHIPPED | OUTPATIENT
Start: 2023-09-26

## 2023-09-26 RX ORDER — HYDROCHLOROTHIAZIDE 12.5 MG/1
12.5 TABLET ORAL EVERY OTHER DAY
Qty: 30 TABLET | Refills: 5 | Status: SHIPPED | OUTPATIENT
Start: 2023-09-26

## 2023-09-26 NOTE — PATIENT INSTRUCTIONS
DASH Diet   About this topic   DASH stands for Dietary Approaches to Stop Hypertension. The DASH diet may help you lower blood pressure. It may also help keep you from getting high blood pressure. You will eat less fat and more fiber on the DASH diet.  This diet gives you more minerals that fight high blood pressure. Some nutrients in this diet are:  Potassium ? Acts to help you get rid of salt. This may help to lower blood pressure.  Calcium ? Makes blood vessels and muscles work the right way  Magnesium - Helps blood vessels relax  Fiber ? Helps you feel full. It also helps digestion.  What will the results be?   The DASH diet may help you:  Lower your blood pressure and cholesterol  Lower your risk for cancer, heart disease, heart attack, and stroke. It may also lower your risk for heart failure, kidney stones, and diabetes.  Lose weight or keep a healthy weight  What lifestyle changes are needed?   Add regular exercise to get the most help from this diet.  Try to lower stress. Find ways to relax.  Stop smoking. Avoid secondhand smoke.  Limit alcohol intake.  What changes to diet are needed?   Know about poor eating habits. Then, you can fix them as you work with the program.  This diet encourages fruits and vegetables, whole grains, lean meats, healthy fats, and low-fat or fat-free dairy products.  This diet is lower in saturated fats, trans-fats, cholesterol, added sugars, and sodium.  Who should use this diet?   This eating plan is good for the whole family. It is also good for people with high blood pressure and those at risk for high blood pressure.  What foods are good to eat?   Grains: Try to eat 6 to 8 servings of whole grain, high fiber foods each day. These are bread, cereals, brown rice, or pasta.  Fruits and vegetables: Eat 4 to 5 servings each day. Try to pick many kinds and colors. Fresh or frozen are best. Look for low sodium or salt-free if you choose canned.  Dairy: Try to eat 2 to 3 servings of  fat free and low fat milk products each day.  Lean meats, poultry, and seafood: Try to eat 6 servings or less of lean meats, poultry, and seafood each day. Try to choose more low fat or lean meats like chicken and turkey. Eat less red meat. Eat more fish instead.  Nuts, seeds, and legumes (dry beans and peas): Try to eat 4 to 5 servings each week. Try to pick nuts such as almonds and walnuts, sunflower seeds, peanut butter, soy beans, lentils, kidney beans, and split peas.  Fats and oils: Try to eat 2 to 3 servings of fats and oils each day. Eat good fats found in fish, nuts, and avocados. Try using olive oil or vegetable oils such as canola oil. Other good oils to try are corn, safflower, sunflower, or soybean oils. Use low-sodium and low-fat salad dressing and mayonnaise.  Condiments: Pepper, herbs, spices, vinegar, lemon or lime juices are great for seasoning. Be careful to choose low-sodium or salt-free products if you use broths, soups, or soy sauce.  Sweets: Try to eat less than 5 servings each week. Choose low-fat and trans fat-free desserts. These are things like fruit flavored gelatin, sorbet, jelly beans, brea crackers, animal crackers, low-fat fig bars, and li snaps. Eat fruit to satisfy your desire for sweets.     What foods should be limited or avoided?   Grains: Salted breads, rolls, crackers, quick breads, self-rising flours, biscuit mixes, regular bread crumbs, instant hot cereals, commercially-prepared rice, pasta, stuffing mixes  Fruits and vegetables: Commercially-prepared potatoes and vegetable mixes, regular canned vegetables and juices, vegetables frozen with sauce or pickled vegetables, processed fruits with salt or sodium  Milk: Whole milk, malted milk, chocolate milk, buttermilk, cheese, ice cream  Meats and beans: Smoked, cured, salted, or canned fish; meats or poultry such as nugent, sausages, sardines; high-fat cuts of meat like beef, lamb, or pork; chicken with the skin on it  Fats:  Cut back on solid fats like butter, lard, and margarine. Eat less food with high saturated fat, cholesterol and total fat.  Condiments and snacks: Salted and canned peas, beans, and olives; salted snack foods; fried foods; soda or other sweetened drinks  Sweets: High-fat baked goods such as muffins, donuts, pastries, commercial baked goods, candy bars  If you choose to drink alcohol, limit the amount you drink. Women should have 1 drink or less per day and men should have 2 drinks or less per day.  Helpful tips   Avoid eating canned vegetables and processed foods. These have a lot of salt in them. Look for a low-salt or low-sodium choice.  Try baking or broiling instead of frying food.  Write down the foods you eat. This will help you track what you have eaten each week.  When you go to a grocery store, have a list or a meal plan. Do not shop when you are hungry to avoid cravings for foods.  Read food labels with care. They will show you how much is in a serving. The amount is given as a percentage of the total amount you need each day. Reading labels will help you make healthy food choices.       Avoid fast foods.  Talk to your doctor or dietitian to see if you need vitamin and mineral supplements to help you balance your diet.  Talk to a dietitian for help.  Where can I learn more?   Academy of [employer] and Dietetics  https://www.eatright.org/health/wellness/heart-and-cardiovascular-health/dash-diet-reducing-hypertension-through-diet-and-lifestyle   FamilyDoctor.org  http://familydoctor.org/familydoctor/en/prevention-wellness/food-nutrition/weight-loss/the-dash-diet-healthy-eating-to-control-your-blood-pressure.html   Last Reviewed Date   2021-03-15  Consumer Information Use and Disclaimer   This information is not specific medical advice and does not replace information you receive from your health care provider. This is only a brief summary of general information. It does NOT include all information about  conditions, illnesses, injuries, tests, procedures, treatments, therapies, discharge instructions or life-style choices that may apply to you. You must talk with your health care provider for complete information about your health and treatment options. This information should not be used to decide whether or not to accept your health care providers advice, instructions or recommendations. Only your health care provider has the knowledge and training to provide advice that is right for you.  Copyright   Copyright © 2021 [employer] and its affiliates and/or licensors. All rights reserved.

## 2023-10-09 ENCOUNTER — TELEPHONE (OUTPATIENT)
Dept: PRIMARY CARE CLINIC | Facility: CLINIC | Age: 78
End: 2023-10-09
Payer: MEDICARE

## 2023-10-09 NOTE — TELEPHONE ENCOUNTER
----- Message from Radha Roth sent at 10/5/2023 10:28 AM CDT -----  .Type:  Needs Medical Advice    Who Called: pt  Symptoms (please be specific): hurting left hip, and thigh    How long has patient had these symptoms:  September  Pharmacy name and phone #:  [facility] - [place], [place] - [address]  Would the patient rather a call back or a response via [facility]?   Best Call Back Number: [phone]  Additional Information: pt is requesting a referral she fell in September and it's still hurting X-ray left leg pain

## 2023-10-09 NOTE — TELEPHONE ENCOUNTER
Spoke to patient, stated she wants to hold on for now. Over weekend the pain got better. Will call if anything changes

## 2023-11-14 RX ORDER — ALENDRONATE SODIUM 70 MG/1
TABLET ORAL
Qty: 4 TABLET | Refills: 4 | Status: SHIPPED | OUTPATIENT
Start: 2023-11-14 | End: 2024-04-01

## 2024-01-02 ENCOUNTER — LAB VISIT (OUTPATIENT)
Dept: LAB | Facility: HOSPITAL | Age: 79
End: 2024-01-02
Attending: INTERNAL MEDICINE
Payer: MEDICARE

## 2024-01-02 ENCOUNTER — TELEPHONE (OUTPATIENT)
Dept: PRIMARY CARE CLINIC | Facility: CLINIC | Age: 79
End: 2024-01-02
Payer: MEDICARE

## 2024-01-02 DIAGNOSIS — I10 ESSENTIAL HYPERTENSION: ICD-10-CM

## 2024-01-02 DIAGNOSIS — E11.9 CONTROLLED TYPE 2 DIABETES MELLITUS WITHOUT COMPLICATION, WITHOUT LONG-TERM CURRENT USE OF INSULIN: Primary | ICD-10-CM

## 2024-01-02 DIAGNOSIS — E11.9 CONTROLLED TYPE 2 DIABETES MELLITUS WITHOUT COMPLICATION, WITHOUT LONG-TERM CURRENT USE OF INSULIN: ICD-10-CM

## 2024-01-02 LAB
ALBUMIN SERPL-MCNC: 4 G/DL (ref 3.4–4.8)
ALBUMIN/GLOB SERPL: 1.7 RATIO (ref 1.1–2)
ALP SERPL-CCNC: 91 UNIT/L (ref 40–150)
ALT SERPL-CCNC: 26 UNIT/L (ref 0–55)
AST SERPL-CCNC: 29 UNIT/L (ref 5–34)
BILIRUB SERPL-MCNC: 0.8 MG/DL
BUN SERPL-MCNC: 16.8 MG/DL (ref 9.8–20.1)
CALCIUM SERPL-MCNC: 9.9 MG/DL (ref 8.4–10.2)
CHLORIDE SERPL-SCNC: 106 MMOL/L (ref 98–107)
CO2 SERPL-SCNC: 30 MMOL/L (ref 23–31)
CREAT SERPL-MCNC: 0.79 MG/DL (ref 0.55–1.02)
EST. AVERAGE GLUCOSE BLD GHB EST-MCNC: 116.9 MG/DL
GFR SERPLBLD CREATININE-BSD FMLA CKD-EPI: >60 MLS/MIN/1.73/M2
GLOBULIN SER-MCNC: 2.4 GM/DL (ref 2.4–3.5)
GLUCOSE SERPL-MCNC: 123 MG/DL (ref 82–115)
HBA1C MFR BLD: 5.7 %
POTASSIUM SERPL-SCNC: 4.5 MMOL/L (ref 3.5–5.1)
PROT SERPL-MCNC: 6.4 GM/DL (ref 5.8–7.6)
SODIUM SERPL-SCNC: 144 MMOL/L (ref 136–145)

## 2024-01-02 PROCEDURE — 36415 COLL VENOUS BLD VENIPUNCTURE: CPT

## 2024-01-02 PROCEDURE — 80053 COMPREHEN METABOLIC PANEL: CPT

## 2024-01-02 PROCEDURE — 83036 HEMOGLOBIN GLYCOSYLATED A1C: CPT

## 2024-01-02 RX ORDER — CEPHALEXIN 500 MG/1
500 CAPSULE ORAL EVERY 8 HOURS
Qty: 30 CAPSULE | Refills: 0 | Status: SHIPPED | OUTPATIENT
Start: 2024-01-02 | End: 2024-01-12

## 2024-01-02 NOTE — TELEPHONE ENCOUNTER
Patient called stating she has a boil on her groin again and asked if you could call in  Cephalexin 500mg 1 tab q8hrs for 10 days at [facility]

## 2024-01-02 NOTE — TELEPHONE ENCOUNTER
I sent to [facility] but it's been in and out of stock so if they don't have it try sending to [facility]. I doubt I'll be back online till late tonight.

## 2024-01-03 ENCOUNTER — OFFICE VISIT (OUTPATIENT)
Dept: PRIMARY CARE CLINIC | Facility: CLINIC | Age: 79
End: 2024-01-03
Payer: MEDICARE

## 2024-01-03 VITALS
HEART RATE: 76 BPM | WEIGHT: 209 LBS | OXYGEN SATURATION: 93 % | BODY MASS INDEX: 33.59 KG/M2 | HEIGHT: 66 IN | SYSTOLIC BLOOD PRESSURE: 139 MMHG | RESPIRATION RATE: 16 BRPM | DIASTOLIC BLOOD PRESSURE: 66 MMHG | TEMPERATURE: 97 F

## 2024-01-03 DIAGNOSIS — N76.4 BOIL OF VULVA: ICD-10-CM

## 2024-01-03 DIAGNOSIS — E11.9 CONTROLLED TYPE 2 DIABETES MELLITUS WITHOUT COMPLICATION, WITHOUT LONG-TERM CURRENT USE OF INSULIN: ICD-10-CM

## 2024-01-03 DIAGNOSIS — I10 ESSENTIAL HYPERTENSION: Primary | ICD-10-CM

## 2024-01-03 DIAGNOSIS — M13.0 POLYARTHROPATHY: ICD-10-CM

## 2024-01-03 DIAGNOSIS — R29.6 RECURRENT FALLS: ICD-10-CM

## 2024-01-03 DIAGNOSIS — H35.3220 EXUDATIVE AGE-RELATED MACULAR DEGENERATION OF LEFT EYE, UNSPECIFIED STAGE: ICD-10-CM

## 2024-01-03 PROCEDURE — 99214 OFFICE O/P EST MOD 30 MIN: CPT | Mod: ,,, | Performed by: INTERNAL MEDICINE

## 2024-01-03 NOTE — PATIENT INSTRUCTIONS
DASH Diet   About this topic   DASH stands for Dietary Approaches to Stop Hypertension. The DASH diet may help you lower blood pressure. It may also help keep you from getting high blood pressure. You will eat less fat and more fiber on the DASH diet.  This diet gives you more minerals that fight high blood pressure. Some nutrients in this diet are:  Potassium ? Acts to help you get rid of salt. This may help to lower blood pressure.  Calcium ? Makes blood vessels and muscles work the right way  Magnesium - Helps blood vessels relax  Fiber ? Helps you feel full. It also helps digestion.  What will the results be?   The DASH diet may help you:  Lower your blood pressure and cholesterol  Lower your risk for cancer, heart disease, heart attack, and stroke. It may also lower your risk for heart failure, kidney stones, and diabetes.  Lose weight or keep a healthy weight  What lifestyle changes are needed?   Add regular exercise to get the most help from this diet.  Try to lower stress. Find ways to relax.  Stop smoking. Avoid secondhand smoke.  Limit alcohol intake.  What changes to diet are needed?   Know about poor eating habits. Then, you can fix them as you work with the program.  This diet encourages fruits and vegetables, whole grains, lean meats, healthy fats, and low-fat or fat-free dairy products.  This diet is lower in saturated fats, trans-fats, cholesterol, added sugars, and sodium.  Who should use this diet?   This eating plan is good for the whole family. It is also good for people with high blood pressure and those at risk for high blood pressure.  What foods are good to eat?   Grains: Try to eat 6 to 8 servings of whole grain, high fiber foods each day. These are bread, cereals, brown rice, or pasta.  Fruits and vegetables: Eat 4 to 5 servings each day. Try to pick many kinds and colors. Fresh or frozen are best. Look for low sodium or salt-free if you choose canned.  Dairy: Try to eat 2 to 3 servings of  fat free and low fat milk products each day.  Lean meats, poultry, and seafood: Try to eat 6 servings or less of lean meats, poultry, and seafood each day. Try to choose more low fat or lean meats like chicken and turkey. Eat less red meat. Eat more fish instead.  Nuts, seeds, and legumes (dry beans and peas): Try to eat 4 to 5 servings each week. Try to pick nuts such as almonds and walnuts, sunflower seeds, peanut butter, soy beans, lentils, kidney beans, and split peas.  Fats and oils: Try to eat 2 to 3 servings of fats and oils each day. Eat good fats found in fish, nuts, and avocados. Try using olive oil or vegetable oils such as canola oil. Other good oils to try are corn, safflower, sunflower, or soybean oils. Use low-sodium and low-fat salad dressing and mayonnaise.  Condiments: Pepper, herbs, spices, vinegar, lemon or lime juices are great for seasoning. Be careful to choose low-sodium or salt-free products if you use broths, soups, or soy sauce.  Sweets: Try to eat less than 5 servings each week. Choose low-fat and trans fat-free desserts. These are things like fruit flavored gelatin, sorbet, jelly beans, brea crackers, animal crackers, low-fat fig bars, and li snaps. Eat fruit to satisfy your desire for sweets.     What foods should be limited or avoided?   Grains: Salted breads, rolls, crackers, quick breads, self-rising flours, biscuit mixes, regular bread crumbs, instant hot cereals, commercially-prepared rice, pasta, stuffing mixes  Fruits and vegetables: Commercially-prepared potatoes and vegetable mixes, regular canned vegetables and juices, vegetables frozen with sauce or pickled vegetables, processed fruits with salt or sodium  Milk: Whole milk, malted milk, chocolate milk, buttermilk, cheese, ice cream  Meats and beans: Smoked, cured, salted, or canned fish; meats or poultry such as nugent, sausages, sardines; high-fat cuts of meat like beef, lamb, or pork; chicken with the skin on it  Fats:  Cut back on solid fats like butter, lard, and margarine. Eat less food with high saturated fat, cholesterol and total fat.  Condiments and snacks: Salted and canned peas, beans, and olives; salted snack foods; fried foods; soda or other sweetened drinks  Sweets: High-fat baked goods such as muffins, donuts, pastries, commercial baked goods, candy bars  If you choose to drink alcohol, limit the amount you drink. Women should have 1 drink or less per day and men should have 2 drinks or less per day.  Helpful tips   Avoid eating canned vegetables and processed foods. These have a lot of salt in them. Look for a low-salt or low-sodium choice.  Try baking or broiling instead of frying food.  Write down the foods you eat. This will help you track what you have eaten each week.  When you go to a grocery store, have a list or a meal plan. Do not shop when you are hungry to avoid cravings for foods.  Read food labels with care. They will show you how much is in a serving. The amount is given as a percentage of the total amount you need each day. Reading labels will help you make healthy food choices.       Avoid fast foods.  Talk to your doctor or dietitian to see if you need vitamin and mineral supplements to help you balance your diet.  Talk to a dietitian for help.  Where can I learn more?   [employer]y [employer] Dietetics  https://www.eatright.org/health/wellness/heart-and-cardiovascular-health/dash-diet-reducing-hypertension-through-diet-and-lifestyle   FamilyDoctor.org  http://familydoctor.org/familydoctor/en/prevention-wellness/food-nutrition/weight-loss/the-dash-diet-healthy-eating-to-control-your-blood-pressure.html   Last Reviewed Date   2021-03-15  Consumer Information Use and Disclaimer   This information is not specific medical advice and does not replace information you receive from your health care provider. This is only a brief summary of general information. It does NOT include all information about  conditions, illnesses, injuries, tests, procedures, treatments, therapies, discharge instructions or life-style choices that may apply to you. You must talk with your health care provider for complete information about your health and treatment options. This information should not be used to decide whether or not to accept your health care providers advice, instructions or recommendations. Only your health care provider has the knowledge and training to provide advice that is right for you.  Copyright   Copyright © 2021 [employer] and its affiliates and/or licensors. All rights reserved.

## 2024-01-03 NOTE — PROGRESS NOTES
Latanya Taylor MD   [id number] [place] [address]     Patient ID: [id number]     Chief Complaint: 3 momths follow up post fall        HPI:     Kari Gomez is a 78 y.o. female here today for a follow up after falls, she has not had any since here. She has been trying to get shoes so she can wear a boot with. The left ankle is collapsing medially since she is using the brace according to her podiatrist. The plantar fascitis is still active. Yesterday she called with recurrence of a vulvar boil. It's improved on the Keflex.     Subjective:     Review of Systems   Constitutional:  Negative for chills and fever.        Weight up, not walking with the weather. She's not sleeping well, she tosses and turns.    Eyes:         She's driving again with her eyes. She goes every three months for the left eye shot, it always hurts.    Respiratory: Negative.     Cardiovascular: Negative.    Musculoskeletal:  Positive for joint pain and myalgias. Negative for falls.        Shoulder is hurting now on the right, the bone    Skin:         Boil is better   Endo/Heme/Allergies:  Does not bruise/bleed easily.       Past Medical History:   Diagnosis Date    Allergic sinusitis     Arthritis     Combined hyperlipidemia     Disc degeneration, lumbar     Diverticula of colon     DJD (degenerative joint disease)     External hemorrhoid     Fracture, fibula     Headache, chronic migraine without aura     HTN (hypertension)     Hypothyroidism     Macular degeneration     Neuropathy     Vitamin D deficiency         Past Surgical History:   Procedure Laterality Date    BREAST BIOPSY      BUNIONECTOMY      CATARACT EXTRACTION, BILATERAL      EYE SURGERY      MACULAR DEGENERATION    FRACTURE SURGERY Left 2021    OPEN REDUCTION AND INTERNAL FIXATION (ORIF) OF FRACTURE OF DISTAL RADIUS  10/01/2021    OPEN REDUCTION AND INTERNAL FIXATION (ORIF) OF INJURY OF ANKLE Right 02/01/2019       Family History   Problem Relation Age of Onset     Hyperlipidemia Mother     Lung cancer Mother     COPD Mother     Stroke Father     Hyperlipidemia Father     Glaucoma Paternal Aunt     Breast cancer Maternal Grandmother     Uncle now with cirrhosis and bad diabetes    Social History     Socioeconomic History    Marital status: Single   Tobacco Use    Smoking status: Never    Smokeless tobacco: Never   Substance and Sexual Activity    Alcohol use: Never    Drug use: Never    Sexual activity: Not Currently     Social Determinants of Health     Financial Resource Strain: Low Risk  (6/21/2023)    Overall Financial Resource Strain (CARDIA)     Difficulty of Paying Living Expenses: Not hard at all   Food Insecurity: No Food Insecurity (6/21/2023)    Hunger Vital Sign     Worried About Running Out of Food in the Last Year: Never true     Ran Out of Food in the Last Year: Never true   Transportation Needs: No Transportation Needs (6/21/2023)    PRAPARE - Transportation     Lack of Transportation (Medical): No     Lack of Transportation (Non-Medical): No   Physical Activity: Sufficiently Active (6/21/2023)    Exercise Vital Sign     Days of Exercise per Week: 5 days     Minutes of Exercise per Session: 30 min   Stress: No Stress Concern Present (6/21/2023)    [employer] - Occupational Stress Questionnaire     Feeling of Stress : Not at all   Social Connections: Moderately Integrated (6/21/2023)    Social Connection and Isolation Panel [NHANES]     Frequency of Communication with Friends and Family: More than three times a week     Frequency of Social Gatherings with Friends and Family: Three times a week     Attends Sabianism Services: More than 4 times per year     Active Member of Clubs or Organizations: Yes     Attends Club or Organization Meetings: 1 to 4 times per year     Marital Status: Never    Housing Stability: Low Risk  (6/21/2023)    Housing Stability Vital Sign     Unable to Pay for Housing in the Last Year: No     Number  of Places Lived in the Last Year: 1     Unstable Housing in the Last Year: No       Review of patient's allergies indicates:   Allergen Reactions    Meperidine      Other reaction(s): doctor said pt is allergic  Other reaction(s): Not Indicated       Outpatient Medications Marked as Taking for the 1/3/24 encounter (Office Visit) with Latanya Taylor MD   Medication Sig Dispense Refill    acetaminophen (TYLENOL ARTHRITIS PAIN ORAL) Take by mouth daily as needed.      alendronate (FOSAMAX) 70 MG tablet TAKE ONE TABLET BY MOUTH ONCE A WEEK 4 tablet 4    ascorbic acid (VITAMIN C ORAL) Take by mouth.      benzonatate (TESSALON) 200 MG capsule Take 1 capsule (200 mg total) by mouth 3 (three) times daily as needed for Cough. 30 capsule 5    cephALEXin (KEFLEX) 500 MG capsule Take 1 capsule (500 mg total) by mouth every 8 (eight) hours. for 10 days 30 capsule 0    cyanocobalamin, vitamin B-12, (VITAMIN B-12 ORAL) Take by mouth once daily at 6am.      diltiaZEM (CARDIZEM CD) 240 MG 24 hr capsule Take 1 capsule (240 mg total) by mouth once daily. 30 capsule 5    docusate sodium (COLACE) 100 MG capsule Take 100 mg by mouth 2 (two) times daily.      DULoxetine (CYMBALTA) 30 MG capsule Take 1 capsule (30 mg total) by mouth once daily. For neuropathy and mood 30 capsule 11    ECHINACEA 1X ORAL Take by mouth once daily at 6am.      fluticasone propionate (FLONASE) 50 mcg/actuation nasal spray 1 spray by Each Nostril route 2 (two) times daily.      hydroCHLOROthiazide (HYDRODIURIL) 12.5 MG Tab Take 1 tablet (12.5 mg total) by mouth every other day. 30 tablet 5    ipratropium (ATROVENT) 42 mcg (0.06 %) nasal spray SMARTSI Spray(s) By Mouth 3 Times Daily PRN      ketorolac (TORADOL) 10 mg tablet TAKE ONE TABLET BY MOUTH EVERY FOUR HOURS AS NEEDED FOR PAIN. MAX OF 4 PER DAY FOR FIVE DAYS 20 tablet 1    levothyroxine (SYNTHROID) 125 MCG tablet Take 1 tablet (125 mcg total) by mouth once daily. 30 tablet 9    LYSINE ORAL Take by  "mouth 2 (two) times a day.      montelukast (SINGULAIR) 10 mg tablet TAKE ONE TABLET BY MOUTH DAILY 30 tablet 11    multivit/folic acid/vit K1 (ONE-A-DAY WOMEN'S 50 PLUS ORAL) Take by mouth 2 (two) times a day.      polyethylene glycol 3350 (MIRALAX ORAL) Take by mouth.      RED YEAST RICE ORAL Take by mouth 2 (two) times a day.      sumatriptan (IMITREX) 100 MG tablet TAKE ONE TABLET BY MOUTH DAILY AS NEEDED FOR MIGRAINE HEADACHE (MAY REPEAT DOSE AFTER 2 HOURS UP TO A MAXIMUM  MG IN 24 HOURS) 9 tablet 3    vit A/vit C/vit E/zinc/copper (PRESERVISION AREDS ORAL) PreserVision AREDS         Patient Care Team:  Latanya Taylor MD as PCP - General (Internal Medicine)  Marisol Griffin DPM as Consulting Physician (Podiatry)  João Cuenca MD as Consulting Physician (Otolaryngology)  Haroon Monreal MD as Consulting Physician (Orthopedic Surgery)  Keyon Hernandez MD as Consulting Physician (Ophthalmology)  Kelvin Matias MD as Consulting Physician (Otolaryngology)  Barron Mabry Jr., MD as Consulting Physician (Pain Medicine)       Objective:     /66   Pulse 76   Temp 96.9 °F (36.1 °C) (Oral)   Resp 16   Ht 5' 6" (1.676 m)   Wt 94.8 kg (209 lb)   SpO2 (!) 93%   BMI 33.73 kg/m²     Physical Exam  HENT:      Head: Normocephalic and atraumatic.   Cardiovascular:      Rate and Rhythm: Normal rate.      Heart sounds: Murmur heard.   Pulmonary:      Effort: Pulmonary effort is normal.      Breath sounds: No wheezing or rales.   Abdominal:      General: Abdomen is flat.      Palpations: Abdomen is soft.      Tenderness: There is no abdominal tenderness.           Lab Visit on 01/02/2024   Component Date Value    Sodium Level 01/02/2024 144     Potassium Level 01/02/2024 4.5     Chloride 01/02/2024 106     Carbon Dioxide 01/02/2024 30     Glucose Level 01/02/2024 123 (H)     Blood Urea Nitrogen 01/02/2024 16.8     Creatinine 01/02/2024 0.79     Calcium Level Total 01/02/2024 9.9     " Protein Total 01/02/2024 6.4     Albumin Level 01/02/2024 4.0     Globulin 01/02/2024 2.4     Albumin/Globulin Ratio 01/02/2024 1.7     Bilirubin Total 01/02/2024 0.8     Alkaline Phosphatase 01/02/2024 91     Alanine Aminotransferase 01/02/2024 26     Aspartate Aminotransfera* 01/02/2024 29     eGFR 01/02/2024 >60     Hemoglobin A1c 01/02/2024 5.7     Estimated Average Glucose 01/02/2024 116.9          Assessment/Plan:     1. Essential hypertension  Comments:  Acceptable control, continue the diltiazem 240mg    2. Controlled type 2 diabetes mellitus without complication, without long-term current use of insulin  Comments:  Excellent A1C, diet control    3. Recurrent falls  Comments:  She has been doing better but not walking as much    4. Exudative age-related macular degeneration of left eye, unspecified stage  Comments:  Getting shots every 3 months still    5. Boil of vulva  Comments:  Improving on Keflex    6. Polyarthropathy  Comments:  Ankles, feet, hands, and shoulders she will find out what arthritis doctor her friend sees, she would like to be evaluated.             Follow up in about 3 months (around 4/3/2024). In addition to their scheduled follow up, the patient has also been instructed to follow up on as needed basis.     Signature:  Latanya Taylor MD  [facility]  [id number] [id number]

## 2024-02-28 ENCOUNTER — OFFICE VISIT (OUTPATIENT)
Dept: PRIMARY CARE CLINIC | Facility: CLINIC | Age: 79
End: 2024-02-28
Payer: MEDICARE

## 2024-02-28 ENCOUNTER — TELEPHONE (OUTPATIENT)
Dept: PRIMARY CARE CLINIC | Facility: CLINIC | Age: 79
End: 2024-02-28

## 2024-02-28 VITALS
TEMPERATURE: 98 F | DIASTOLIC BLOOD PRESSURE: 73 MMHG | HEIGHT: 66 IN | HEART RATE: 75 BPM | BODY MASS INDEX: 33.43 KG/M2 | OXYGEN SATURATION: 95 % | SYSTOLIC BLOOD PRESSURE: 148 MMHG | WEIGHT: 208 LBS
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
## 2024-02-28 DIAGNOSIS — S40.011A CONTUSION OF RIGHT SHOULDER, INITIAL ENCOUNTER: ICD-10-CM

## 2024-02-28 DIAGNOSIS — G44.86 CERVICOGENIC HEADACHE: ICD-10-CM

## 2024-02-28 PROCEDURE — 99213 OFFICE O/P EST LOW 20 MIN: CPT | Mod: ,,, | Performed by: INTERNAL MEDICINE

## 2024-02-28 NOTE — TELEPHONE ENCOUNTER
----- Message from Norah Gael sent at 2/28/2024  9:34 AM CST -----  Regarding: medical; advice  Type:  Needs Medical Advice    Who Called: Kari  Symptoms (please be specific): fell, back pain, shoulder pain and headache      How long has patient had these symptoms: pt fell yesterday afternoon     Pharmacy name and phone #:      Would the patient rather a call back or a response via [facility]?     Best Call Back Number: [phone]    Additional Information:    Pt wants to be checked out

## 2024-02-28 NOTE — PROGRESS NOTES
Latanya Taylor MD   [address] [address]     Patient ID: [id number]     Chief Complaint: Fall (Felled on right side of the body), Shoulder Pain (Right shoulder , caused by fall), and Headache        HPI:     Kari Gomez is a 78 y.o. female here today for a fall. It occurred yesterday. She was sleeping and her yard man asked her to show him what she wanted done and she went out by [place] and while standing she went straight back onto the grass. She wasn't dizzy. She did not loose her balance. Her ground was uneven and she didn't take her glasses out to the yard and thinks it's that that caused the fall. Her right shoulder is hurting and she has a headache but slight.       Subjective:     Review of Systems   Eyes:         She is getting an injection next week   Respiratory: Negative.     Cardiovascular: Negative.    Musculoskeletal:         Has to use cane in the right hand, she can't control on the left. She is getting a show adjusted to work as her brace   Neurological:  Positive for focal weakness and headaches. Negative for dizziness.       Past Medical History:   Diagnosis Date    Allergic sinusitis     Arthritis     Combined hyperlipidemia     Disc degeneration, lumbar     Diverticula of colon     DJD (degenerative joint disease)     External hemorrhoid     Fracture, fibula     Headache, chronic migraine without aura     HTN (hypertension)     Hypothyroidism     Macular degeneration     Neuropathy     Vitamin D deficiency         Past Surgical History:   Procedure Laterality Date    BREAST BIOPSY      BUNIONECTOMY      CATARACT EXTRACTION, BILATERAL      EYE SURGERY      MACULAR DEGENERATION    FRACTURE SURGERY Left 2021    OPEN REDUCTION AND INTERNAL FIXATION (ORIF) OF FRACTURE OF DISTAL RADIUS  10/01/2021    OPEN REDUCTION AND INTERNAL FIXATION (ORIF) OF INJURY OF ANKLE Right 02/01/2019       Family History   Problem Relation Age of Onset    Hyperlipidemia Mother     Lung cancer Mother      COPD Mother     Stroke Father     Hyperlipidemia Father     Glaucoma Paternal Aunt     Breast cancer Maternal Grandmother         Social History     Socioeconomic History    Marital status: Single   Tobacco Use    Smoking status: Never    Smokeless tobacco: Never   Substance and Sexual Activity    Alcohol use: Never    Drug use: Never    Sexual activity: Not Currently     Social Determinants of Health     Financial Resource Strain: Low Risk  (6/21/2023)    Overall Financial Resource Strain (CARDIA)     Difficulty of Paying Living Expenses: Not hard at all   Food Insecurity: No Food Insecurity (6/21/2023)    Hunger Vital Sign     Worried About Running Out of Food in the Last Year: Never true     Ran Out of Food in the Last Year: Never true   Transportation Needs: No Transportation Needs (6/21/2023)    PRAPARE - Transportation     Lack of Transportation (Medical): No     Lack of Transportation (Non-Medical): No   Physical Activity: Sufficiently Active (6/21/2023)    Exercise Vital Sign     Days of Exercise per Week: 5 days     Minutes of Exercise per Session: 30 min   Stress: No Stress Concern Present (6/21/2023)    Citizen of [place] of Occupational Health - Occupational Stress Questionnaire     Feeling of Stress : Not at all   Social Connections: Moderately Integrated (6/21/2023)    Social Connection and Isolation Panel [NHANES]     Frequency of Communication with Friends and Family: More than three times a week     Frequency of Social Gatherings with Friends and Family: Three times a week     Attends Church Services: More than 4 times per year     Active Member of Clubs or Organizations: Yes     Attends Club or Organization Meetings: 1 to 4 times per year     Marital Status: Never    Housing Stability: Low Risk  (6/21/2023)    Housing Stability Vital Sign     Unable to Pay for Housing in the Last Year: No     Number of Places Lived in the Last Year: 1     Unstable Housing in the Last Year: No       Review  of patient's allergies indicates:   Allergen Reactions    Meperidine      Other reaction(s): doctor said pt is allergic  Other reaction(s): Not Indicated       Outpatient Medications Marked as Taking for the 24 encounter (Office Visit) with Latanya Taylor MD   Medication Sig Dispense Refill    acetaminophen (TYLENOL ARTHRITIS PAIN ORAL) Take by mouth daily as needed.      alendronate (FOSAMAX) 70 MG tablet TAKE ONE TABLET BY MOUTH ONCE A WEEK 4 tablet 4    calcium carbonate/vitamin D3 (VITAMIN D-3 ORAL) Take 2,000 mg by mouth Daily.      CALCIUM-MAGNESIUM ORAL Take by mouth once daily at 6am.      cholecalciferol, vitamin D3, (VITAMIN D3) 50 mcg (2,000 unit) Cap capsule Take 4,000 Units by mouth once daily.      cyanocobalamin, vitamin B-12, (VITAMIN B-12 ORAL) Take by mouth once daily at 6am.      diltiaZEM (CARDIZEM CD) 240 MG 24 hr capsule Take 1 capsule (240 mg total) by mouth once daily. 30 capsule 5    docusate sodium (COLACE) 100 MG capsule Take 100 mg by mouth 2 (two) times daily.      DULoxetine (CYMBALTA) 30 MG capsule Take 1 capsule (30 mg total) by mouth once daily. For neuropathy and mood 30 capsule 11    ECHINACEA 1X ORAL Take by mouth once daily at 6am.      ferrous fumarate/vit Bcomp,C (SUPER B COMPLEX ORAL) Take by mouth.      fluticasone propionate (FLONASE) 50 mcg/actuation nasal spray 1 spray by Each Nostril route 2 (two) times daily.      hydroCHLOROthiazide (HYDRODIURIL) 12.5 MG Tab Take 1 tablet (12.5 mg total) by mouth every other day. 30 tablet 5    ipratropium (ATROVENT) 42 mcg (0.06 %) nasal spray SMARTSI Spray(s) By Mouth 3 Times Daily PRN      ketorolac (TORADOL) 10 mg tablet TAKE ONE TABLET BY MOUTH EVERY FOUR HOURS AS NEEDED FOR PAIN. MAX OF 4 PER DAY FOR FIVE DAYS 20 tablet 1    levothyroxine (SYNTHROID) 125 MCG tablet Take 1 tablet (125 mcg total) by mouth once daily. 30 tablet 9    LYSINE ORAL Take by mouth 2 (two) times a day.      montelukast (SINGULAIR) 10 mg tablet  "TAKE ONE TABLET BY MOUTH DAILY 30 tablet 11    multivit/folic acid/vit K1 (ONE-A-DAY WOMEN'S 50 PLUS ORAL) Take by mouth 2 (two) times a day.      mv-mn/iron/folic acid/herb 190 (VITAMIN D3 COMPLETE ORAL) Vitamin D      polyethylene glycol 3350 (MIRALAX ORAL) Take by mouth.      RED YEAST RICE ORAL Take by mouth 2 (two) times a day.      sumatriptan (IMITREX) 100 MG tablet TAKE ONE TABLET BY MOUTH DAILY AS NEEDED FOR MIGRAINE HEADACHE (MAY REPEAT DOSE AFTER 2 HOURS UP TO A MAXIMUM  MG IN 24 HOURS) 9 tablet 3    UNABLE TO FIND Take 1 capsule by mouth once daily. medication name: K+2 Potassium      vit A/vit C/vit E/zinc/copper (PRESERVISION AREDS ORAL) PreserVision AREDS      [DISCONTINUED] UNABLE TO FIND Take 1 capsule by mouth once daily. medication name: K+2 Potassium         Patient Care Team:  Latanya Taylor MD as PCP - General (Internal Medicine)  Marisol Griffin DPM as Consulting Physician (Podiatry)  João Cuenca MD as Consulting Physician (Otolaryngology)  Haroon Monreal MD as Consulting Physician (Orthopedic Surgery)  Keyon Hernandez MD as Consulting Physician (Ophthalmology)  Kelvin Matias MD as Consulting Physician (Otolaryngology)  Barron Mabry Jr., MD as Consulting Physician (Pain Medicine)       Objective:     BP (!) 148/73   Pulse 75   Temp 98 °F (36.7 °C) (Oral)   Ht 5' 6" (1.676 m)   Wt 94.3 kg (208 lb)   SpO2 95%   BMI 33.57 kg/m²     Physical Exam  Vitals reviewed.   Musculoskeletal:         General: Tenderness and deformity present.      Comments: Arthritic changes on the shoulder small effusion and has pain with ROM   Skin:     General: Skin is warm and dry.      Findings: No bruising.   Neurological:      Mental Status: She is alert and oriented to person, place, and time.      Motor: Weakness present.      Gait: Gait abnormal.               Assessment/Plan:     1. Accidental fall, initial encounter  Comments:  Aware of safety will not go out without " glasses. If another fall I will want her to see if there is adaptive walker that she could use.    2. Contusion of right shoulder, initial encounter  Comments:  No bruising at shoulder will not order xray, could add baclofen but she lives alone so will stay with topicals she has    3. Cervicogenic headache  Comments:  Continue Tylenol             Follow up for as scheduled in April. In addition to their scheduled follow up, the patient has also been instructed to follow up on as needed basis.     Signature:  Latanya Taylor MD  [facility]  [id number] [id number]

## 2024-03-01 ENCOUNTER — DOCUMENTATION ONLY (OUTPATIENT)
Dept: PRIMARY CARE CLINIC | Facility: CLINIC | Age: 79
End: 2024-03-01
Payer: MEDICARE

## 2024-04-01 RX ORDER — ALENDRONATE SODIUM 70 MG/1
TABLET ORAL
Qty: 4 TABLET | Refills: 4 | Status: SHIPPED | OUTPATIENT
Start: 2024-04-01

## 2024-04-01 NOTE — PROGRESS NOTES
Latanya Taylor MD   [address] [address]     Patient ID: [id number]     Chief Complaint: 3 Months follow up for medication management.   (Complain of recent fall complain head hurting.) and Hip Pain        HPI:     Kari Gomez is a 78 y.o. female here today for a follow up of DJD, DM, HTN, hypothyroid, hyperlipidemia, Macular degeneration, and vitamin d deficiency. She fell when a neighborhood dog jumped on her as she was walking to her friends. She got on a pillow and the lady at the house was small but was able to help her get up. She had a lump on the scalp and abrasions on her left hand and right elbow. She still has a lump on the head and is having headaches. She is using Neosporin on the abrasions and they are looking better but the left is deep. The headaches are doing better now and she's sleeping better.      Subjective:     Review of Systems   Constitutional:         Tired today she took her cousin to do her testing   HENT:          Seen at [facility] for sinus, they gave levocetirazine and it's really helped her rest.    Respiratory: Negative.     Cardiovascular:         BP up with pain she believes   Musculoskeletal:  Positive for back pain and myalgias.        The right is hurting now more than the left   Skin:         As per HPI   Neurological:  Positive for headaches. Negative for dizziness.       Past Medical History:   Diagnosis Date    Allergic sinusitis     Arthritis     Combined hyperlipidemia     Disc degeneration, lumbar     Diverticula of colon     DJD (degenerative joint disease)     External hemorrhoid     Fracture, fibula     Headache, chronic migraine without aura     HTN (hypertension)     Hypothyroidism     Macular degeneration     Neuropathy     Vitamin D deficiency         Past Surgical History:   Procedure Laterality Date    BREAST BIOPSY      BUNIONECTOMY      CATARACT EXTRACTION, BILATERAL      EYE SURGERY      MACULAR DEGENERATION    FRACTURE SURGERY Left 2021    OPEN  REDUCTION AND INTERNAL FIXATION (ORIF) OF FRACTURE OF DISTAL RADIUS  10/01/2021    OPEN REDUCTION AND INTERNAL FIXATION (ORIF) OF INJURY OF ANKLE Right 02/01/2019       Family History   Problem Relation Age of Onset    Hyperlipidemia Mother     Lung cancer Mother     COPD Mother     Stroke Father     Hyperlipidemia Father     Breast cancer Maternal Grandmother     Glaucoma Paternal Aunt     Kidney cancer Cousin     Cousin doing chemo for kidney cancer, she's been bleeding.    Social History     Socioeconomic History    Marital status: Single   Tobacco Use    Smoking status: Never    Smokeless tobacco: Never   Substance and Sexual Activity    Alcohol use: Never    Drug use: Never    Sexual activity: Not Currently     Social Determinants of Health     Financial Resource Strain: Low Risk  (6/21/2023)    Overall Financial Resource Strain (CARDIA)     Difficulty of Paying Living Expenses: Not hard at all   Food Insecurity: No Food Insecurity (6/21/2023)    Hunger Vital Sign     Worried About Running Out of Food in the Last Year: Never true     Ran Out of Food in the Last Year: Never true   Transportation Needs: No Transportation Needs (6/21/2023)    PRAPARE - Transportation     Lack of Transportation (Medical): No     Lack of Transportation (Non-Medical): No   Physical Activity: Sufficiently Active (6/21/2023)    Exercise Vital Sign     Days of Exercise per Week: 5 days     Minutes of Exercise per Session: 30 min   Stress: No Stress Concern Present (6/21/2023)    Citizen of [place] [employer] Occupational Health - Occupational Stress Questionnaire     Feeling of Stress : Not at all   Social Connections: Moderately Integrated (6/21/2023)    Social Connection and Isolation Panel [NHANES]     Frequency of Communication with Friends and Family: More than three times a week     Frequency of Social Gatherings with Friends and Family: Three times a week     Attends Shinto Services: More than 4 times per year     Active Member of  Clubs or Organizations: Yes     Attends Club or Organization Meetings: 1 to 4 times per year     Marital Status: Never    Housing Stability: Low Risk  (6/21/2023)    Housing Stability Vital Sign     Unable to Pay for Housing in the Last Year: No     Number of Places Lived in the Last Year: 1     Unstable Housing in the Last Year: No       Review of patient's allergies indicates:   Allergen Reactions    Meperidine      Other reaction(s): doctor said pt is allergic  Other reaction(s): Not Indicated       Outpatient Medications Marked as Taking for the 4/3/24 encounter (Office Visit) with Latanya Taylor MD   Medication Sig Dispense Refill    acetaminophen (TYLENOL ARTHRITIS PAIN ORAL) Take by mouth daily as needed.      alendronate (FOSAMAX) 70 MG tablet TAKE ONE TABLET BY MOUTH ONCE A WEEK 4 tablet 4    ascorbic acid (VITAMIN C ORAL) Take by mouth.      calcium carbonate/vitamin D3 (VITAMIN D-3 ORAL) Take 2,000 mg by mouth Daily.      CALCIUM-MAGNESIUM ORAL Take by mouth once daily at 6am.      cholecalciferol, vitamin D3, (VITAMIN D3) 50 mcg (2,000 unit) Cap capsule Take 4,000 Units by mouth once daily.      cyanocobalamin, vitamin B-12, (VITAMIN B-12 ORAL) Take by mouth once daily at 6am.      diltiaZEM (CARDIZEM CD) 240 MG 24 hr capsule Take 1 capsule (240 mg total) by mouth once daily. 30 capsule 5    docusate sodium (COLACE) 100 MG capsule Take 100 mg by mouth 2 (two) times daily.      DULoxetine (CYMBALTA) 30 MG capsule Take 1 capsule (30 mg total) by mouth once daily. For neuropathy and mood 30 capsule 11    ECHINACEA 1X ORAL Take by mouth once daily at 6am.      ferrous fumarate/vit Bcomp,C (SUPER B COMPLEX ORAL) Take by mouth.      fluticasone propionate (FLONASE) 50 mcg/actuation nasal spray 1 spray by Each Nostril route 2 (two) times daily.      hydroCHLOROthiazide (HYDRODIURIL) 12.5 MG Tab Take 1 tablet (12.5 mg total) by mouth every other day. 30 tablet 5    ipratropium (ATROVENT) 42 mcg (0.06 %)  "nasal spray SMARTSI Spray(s) By Mouth 3 Times Daily PRN      ketorolac (TORADOL) 10 mg tablet TAKE ONE TABLET BY MOUTH EVERY FOUR HOURS AS NEEDED FOR PAIN. MAX OF 4 PER DAY FOR FIVE DAYS 20 tablet 1    levothyroxine (SYNTHROID) 125 MCG tablet Take 1 tablet (125 mcg total) by mouth once daily. 30 tablet 9    LYSINE ORAL Take by mouth 2 (two) times a day.      magnesium salicylate (DOANS PILLS ORAL) Take by mouth.      montelukast (SINGULAIR) 10 mg tablet TAKE ONE TABLET BY MOUTH DAILY 30 tablet 11    multivit/folic acid/vit K1 (ONE-A-DAY WOMEN'S 50 PLUS ORAL) Take by mouth 2 (two) times a day.      mv-mn/iron/folic acid/herb 190 (VITAMIN D3 COMPLETE ORAL) Vitamin D      polyethylene glycol 3350 (MIRALAX ORAL) Take by mouth.      RED YEAST RICE ORAL Take by mouth 2 (two) times a day.      sumatriptan (IMITREX) 100 MG tablet TAKE ONE TABLET BY MOUTH DAILY AS NEEDED FOR MIGRAINE HEADACHE (MAY REPEAT DOSE AFTER 2 HOURS UP TO A MAXIMUM  MG IN 24 HOURS) 9 tablet 3    UNABLE TO FIND Take 1 capsule by mouth once daily. medication name: K+2 Potassium      vit A/vit C/vit E/zinc/copper (PRESERVISION AREDS ORAL) PreserVision AREDS      [DISCONTINUED] levocetirizine (XYZAL) 5 MG tablet Take 5 mg by mouth every evening.         Patient Care Team:  Latanya Taylor MD as PCP - General (Internal Medicine)  Marisol Griffin DPM as Consulting Physician (Podiatry)  João Cuenca MD as Consulting Physician (Otolaryngology)  Haroon Monreal MD as Consulting Physician (Orthopedic Surgery)  Keyon Hernandez MD as Consulting Physician (Ophthalmology)  Kelvin Matias MD as Consulting Physician (Otolaryngology)  Barron Mabry Jr., MD as Consulting Physician (Pain Medicine)       Objective:     BP (!) 154/78   Pulse 71   Temp 98.8 °F (37.1 °C) (Oral)   Resp 16   Ht 5' 6" (1.676 m)   Wt 95.3 kg (210 lb)   BMI 33.89 kg/m²     Physical Exam  Vitals reviewed.   Constitutional:       Appearance: She is " ill-appearing.   Cardiovascular:      Rate and Rhythm: Normal rate and regular rhythm.   Pulmonary:      Effort: Pulmonary effort is normal.      Breath sounds: Normal breath sounds.   Musculoskeletal:      Comments: ROM on elbows is full, neck has some resistance    Neurological:      Mental Status: She is alert.   Psychiatric:         Mood and Affect: Mood normal.         Behavior: Behavior normal.               Assessment/Plan:     1. Accidental fall, initial encounter  Comments:  Pushed over by dog, discussed safety.SHe would like a different walker that is elevated arm rests so she would be able to stand up straight, her's is too low.    2. Degeneration of intervertebral disc of lumbar region  Comments:  With her DJD in back and legs she would benefit from a walker that she stands upright. With recurrent falls the 4 prong cane is no longer enough support.    3. Essential hypertension  Comments:  Elevated with pain    4. Controlled type 2 diabetes mellitus without complication, without long-term current use of insulin    5. Acquired hypothyroidism    6. Primary osteoarthritis involving multiple joints    7. Allergic sinusitis  Comments:  Ok to continue Xyzal    Other orders  -     levocetirizine (XYZAL) 5 MG tablet; Take 1 tablet (5 mg total) by mouth every evening.  Dispense: 30 tablet; Refill: 5             Follow up in about 3 months (around 7/3/2024) for Wellness. In addition to their scheduled follow up, the patient has also been instructed to follow up on as needed basis.     Signature:  Latanya Taylor MD  [facility]  [id number] [id number]

## 2024-04-03 ENCOUNTER — OFFICE VISIT (OUTPATIENT)
Dept: PRIMARY CARE CLINIC | Facility: CLINIC | Age: 79
End: 2024-04-03
Payer: MEDICARE

## 2024-04-03 VITALS
DIASTOLIC BLOOD PRESSURE: 78 MMHG | HEIGHT: 66 IN | BODY MASS INDEX: 33.75 KG/M2 | RESPIRATION RATE: 16 BRPM | HEART RATE: 71 BPM | WEIGHT: 210 LBS | SYSTOLIC BLOOD PRESSURE: 154 MMHG | TEMPERATURE: 99 F

## 2024-04-03 DIAGNOSIS — E55.9 VITAMIN D DEFICIENCY, UNSPECIFIED: ICD-10-CM

## 2024-04-03 DIAGNOSIS — E11.9 CONTROLLED TYPE 2 DIABETES MELLITUS WITHOUT COMPLICATION, WITHOUT LONG-TERM CURRENT USE OF INSULIN: ICD-10-CM

## 2024-04-03 DIAGNOSIS — E03.9 ACQUIRED HYPOTHYROIDISM: ICD-10-CM

## 2024-04-03 DIAGNOSIS — M51.36 DEGENERATION OF INTERVERTEBRAL DISC OF LUMBAR REGION: ICD-10-CM

## 2024-04-03 DIAGNOSIS — M15.9 PRIMARY OSTEOARTHRITIS INVOLVING MULTIPLE JOINTS: ICD-10-CM

## 2024-04-03 DIAGNOSIS — I10 ESSENTIAL HYPERTENSION: ICD-10-CM

## 2024-04-03 DIAGNOSIS — T45.2X1S VITAMIN D OVERDOSE, ACCIDENTAL OR UNINTENTIONAL, SEQUELA: ICD-10-CM
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
## 2024-04-03 DIAGNOSIS — J30.9 ALLERGIC SINUSITIS: ICD-10-CM

## 2024-04-03 DIAGNOSIS — D50.8 HYPOCHROMIC ERYTHROCYTES: ICD-10-CM

## 2024-04-03 PROCEDURE — 99214 OFFICE O/P EST MOD 30 MIN: CPT | Mod: ,,, | Performed by: INTERNAL MEDICINE

## 2024-04-03 RX ORDER — CEPHALEXIN 500 MG/1
500 CAPSULE ORAL 3 TIMES DAILY
COMMUNITY
Start: 2024-03-11 | End: 2024-05-07 | Stop reason: SDUPTHER

## 2024-04-03 RX ORDER — LEVOCETIRIZINE DIHYDROCHLORIDE 5 MG/1
5 TABLET, FILM COATED ORAL NIGHTLY
COMMUNITY
Start: 2024-03-12 | End: 2024-04-03 | Stop reason: SDUPTHER

## 2024-04-03 RX ORDER — LEVOCETIRIZINE DIHYDROCHLORIDE 5 MG/1
5 TABLET, FILM COATED ORAL NIGHTLY
Qty: 30 TABLET | Refills: 5 | Status: SHIPPED | OUTPATIENT
Start: 2024-04-03

## 2024-04-29 RX ORDER — DILTIAZEM HYDROCHLORIDE 240 MG/1
240 CAPSULE, COATED, EXTENDED RELEASE ORAL
Qty: 30 CAPSULE | Refills: 5 | Status: SHIPPED | OUTPATIENT
Start: 2024-04-29

## 2024-05-07 ENCOUNTER — TELEPHONE (OUTPATIENT)
Dept: PRIMARY CARE CLINIC | Facility: CLINIC | Age: 79
End: 2024-05-07
Payer: MEDICARE

## 2024-05-07 RX ORDER — CEPHALEXIN 500 MG/1
500 CAPSULE ORAL 3 TIMES DAILY
Qty: 30 CAPSULE | Refills: 0 | Status: SHIPPED | OUTPATIENT
Start: 2024-05-07

## 2024-05-07 NOTE — TELEPHONE ENCOUNTER
----- Message from Win Echevarria sent at 5/7/2024 10:17 AM CDT -----  .Who Called: Kari Goemz    Refill or New Rx:Refill  RX Name and Strength:cephALEXin (KEFLEX) 500 MG capsule  How is the patient currently taking it? (ex. 1XDay):Take 500 mg by mouth 3 (three) times daily. - Oral  Is this a 30 day or 90 day RX:  Local or Mail Order:local  List of preferred pharmacies on file (remove unneeded): [facility] - [facility], [place] - [address] [place] [address]  Ordering Provider:Latanya Taylor MD      Preferred Method of Contact: Phone Call  Patient's Preferred Phone Number on File: [phone]   Best Call Back Number, if different:  Additional Information:

## 2024-05-20 RX ORDER — KETOROLAC TROMETHAMINE 10 MG/1
TABLET, FILM COATED ORAL
Qty: 20 TABLET | Refills: 1 | Status: SHIPPED | OUTPATIENT
Start: 2024-05-20

## 2024-06-26 ENCOUNTER — TELEPHONE (OUTPATIENT)
Dept: PRIMARY CARE CLINIC | Facility: CLINIC | Age: 79
End: 2024-06-26
Payer: MEDICARE

## 2024-06-26 NOTE — TELEPHONE ENCOUNTER
----- Message from Win Echevarria sent at 6/25/2024  9:36 AM CDT -----  .Who Called: Kari Gomez    Patient is returning phone call    Who Left Message for Patient:Veronica  Does the patient know what this is regarding?:referral?      Preferred Method of Contact: Phone Call  Patient's Preferred Phone Number on File: [phone]   Best Call Back Number, if different:  Additional Information:  pt states returning call, nothing listed in chart, pt states she will be able to answer her phone after 1 pm

## 2024-07-09 ENCOUNTER — TELEPHONE (OUTPATIENT)
Dept: PRIMARY CARE CLINIC | Facility: CLINIC | Age: 79
End: 2024-07-09
Payer: MEDICARE

## 2024-07-10 ENCOUNTER — APPOINTMENT (OUTPATIENT)
Dept: LAB | Facility: HOSPITAL | Age: 79
End: 2024-07-10
Attending: INTERNAL MEDICINE
Payer: MEDICARE

## 2024-07-11 RX ORDER — MONTELUKAST SODIUM 10 MG/1
TABLET ORAL
Qty: 30 TABLET | Refills: 11 | Status: SHIPPED | OUTPATIENT
Start: 2024-07-11

## 2024-07-14 PROBLEM — S82.409A FRACTURE OF FIBULA: Status: RESOLVED | Noted: 2022-05-09 | Resolved: 2024-07-14

## 2024-07-15 ENCOUNTER — OFFICE VISIT (OUTPATIENT)
Dept: PRIMARY CARE CLINIC | Facility: CLINIC | Age: 79
End: 2024-07-15
Payer: MEDICARE

## 2024-07-15 VITALS
OXYGEN SATURATION: 95 % | BODY MASS INDEX: 34.55 KG/M2 | HEIGHT: 66 IN | RESPIRATION RATE: 18 BRPM | WEIGHT: 215 LBS | SYSTOLIC BLOOD PRESSURE: 135 MMHG | DIASTOLIC BLOOD PRESSURE: 75 MMHG | HEART RATE: 65 BPM | TEMPERATURE: 98 F

## 2024-07-15 DIAGNOSIS — I83.93 ASYMPTOMATIC VARICOSE VEINS OF BOTH LOWER EXTREMITIES: ICD-10-CM

## 2024-07-15 DIAGNOSIS — E78.2 MIXED HYPERLIPIDEMIA: ICD-10-CM

## 2024-07-15 DIAGNOSIS — E11.9 CONTROLLED TYPE 2 DIABETES MELLITUS WITHOUT COMPLICATION, WITHOUT LONG-TERM CURRENT USE OF INSULIN: ICD-10-CM

## 2024-07-15 DIAGNOSIS — I10 ESSENTIAL HYPERTENSION: ICD-10-CM

## 2024-07-15 DIAGNOSIS — M15.9 PRIMARY OSTEOARTHRITIS INVOLVING MULTIPLE JOINTS: ICD-10-CM

## 2024-07-15 DIAGNOSIS — H35.3220 EXUDATIVE AGE-RELATED MACULAR DEGENERATION OF LEFT EYE, UNSPECIFIED STAGE: ICD-10-CM

## 2024-07-15 DIAGNOSIS — Z00.00 MEDICARE ANNUAL WELLNESS VISIT, SUBSEQUENT: Primary | ICD-10-CM

## 2024-07-15 DIAGNOSIS — E55.9 VITAMIN D DEFICIENCY: ICD-10-CM

## 2024-07-15 DIAGNOSIS — G63 PERIPHERAL NEUROPATHY DUE TO METABOLIC DISORDER: ICD-10-CM

## 2024-07-15 DIAGNOSIS — E03.9 ACQUIRED HYPOTHYROIDISM: ICD-10-CM

## 2024-07-15 DIAGNOSIS — D50.0 IRON DEFICIENCY ANEMIA DUE TO CHRONIC BLOOD LOSS: ICD-10-CM

## 2024-07-15 DIAGNOSIS — E88.9 PERIPHERAL NEUROPATHY DUE TO METABOLIC DISORDER: ICD-10-CM

## 2024-07-15 PROCEDURE — G0439 PPPS, SUBSEQ VISIT: HCPCS | Mod: ,,, | Performed by: INTERNAL MEDICINE

## 2024-07-15 RX ORDER — GABAPENTIN 100 MG/1
100 CAPSULE ORAL 3 TIMES DAILY
Qty: 90 CAPSULE | Refills: 2 | Status: SHIPPED | OUTPATIENT
Start: 2024-07-15 | End: 2024-10-13

## 2024-07-15 RX ORDER — MV-MIN/FA/VIT K/LUTEIN/ZEAXANT 200MCG-5MG
CAPSULE ORAL
COMMUNITY
End: 2024-07-15 | Stop reason: SDUPTHER

## 2024-07-15 NOTE — PROGRESS NOTES
Patient ID: [id number]     Chief Complaint: Medicare AWV and Spasms (Leg and back spasms)      HPI:     Kari Gomez is a 79 y.o. female here today for a Medicare Wellness.  She had canceled her ENT last week with side pain and she wasn't able to exercise or walk.       Opioid Screening: Patient medication list reviewed, patient is not taking prescription opioids. Patient is not using additional opioids than prescribed. Patient is at low risk of substance abuse based on this opioid use history.       Past Medical History:   Diagnosis Date    Allergic sinusitis     Arthritis     Combined hyperlipidemia     Disc degeneration, lumbar     Diverticula of colon     DJD (degenerative joint disease)     External hemorrhoid     Fracture of fibula 05/09/2022    right      Fracture, fibula     Headache, chronic migraine without aura     HTN (hypertension)     Hypothyroidism     Macular degeneration     Neuropathy     Vitamin D deficiency         Past Surgical History:   Procedure Laterality Date    BREAST BIOPSY      BUNIONECTOMY      CATARACT EXTRACTION, BILATERAL      EYE SURGERY      MACULAR DEGENERATION    FRACTURE SURGERY Left 2021    OPEN REDUCTION AND INTERNAL FIXATION (ORIF) OF FRACTURE OF DISTAL RADIUS  10/01/2021    OPEN REDUCTION AND INTERNAL FIXATION (ORIF) OF INJURY OF ANKLE Right 02/01/2019       Review of patient's allergies indicates:   Allergen Reactions    Meperidine      Other reaction(s): doctor said pt is allergic  Other reaction(s): Not Indicated       Outpatient Medications Marked as Taking for the 7/15/24 encounter (Office Visit) with Latanya Taylor MD   Medication Sig Dispense Refill    acetaminophen (TYLENOL ARTHRITIS PAIN ORAL) Take by mouth daily as needed.      alendronate (FOSAMAX) 70 MG tablet TAKE ONE TABLET BY MOUTH ONCE A WEEK 4 tablet 4    ascorbic acid (VITAMIN C ORAL) Take by mouth.      calcium carbonate/vitamin D3 (VITAMIN D-3 ORAL) Take 2,000 mg by mouth Daily.       CALCIUM-MAGNESIUM ORAL Take by mouth once daily at 6am.      cholecalciferol, vitamin D3, (VITAMIN D3) 50 mcg (2,000 unit) Cap capsule Take 4,000 Units by mouth once daily.      cyanocobalamin, vitamin B-12, (VITAMIN B-12 ORAL) Take by mouth once daily at 6am.      diltiaZEM (CARDIZEM CD) 240 MG 24 hr capsule TAKE ONE CAPSULE BY MOUTH DAILY 30 capsule 5    docusate sodium (COLACE) 100 MG capsule Take 100 mg by mouth 2 (two) times daily.      DULoxetine (CYMBALTA) 30 MG capsule Take 1 capsule (30 mg total) by mouth once daily. For neuropathy and mood 30 capsule 11    ECHINACEA 1X ORAL Take by mouth once daily at 6am.      ferrous fumarate/vit Bcomp,C (SUPER B COMPLEX ORAL) Take by mouth.      fluticasone propionate (FLONASE) 50 mcg/actuation nasal spray 1 spray by Each Nostril route 2 (two) times daily.      hydroCHLOROthiazide (HYDRODIURIL) 12.5 MG Tab Take 1 tablet (12.5 mg total) by mouth every other day. 30 tablet 5    ipratropium (ATROVENT) 42 mcg (0.06 %) nasal spray SMARTSI Spray(s) By Mouth 3 Times Daily PRN      ketorolac (TORADOL) 10 mg tablet TAKE ONE TABLET BY MOUTH EVERY FOUR HOURS AS NEEDED FOR PAIN. MAXIMUM OF FOUR TABLETS PER DAY FOR FIVE DAYS 20 tablet 1    levocetirizine (XYZAL) 5 MG tablet Take 1 tablet (5 mg total) by mouth every evening. 30 tablet 5    levothyroxine (SYNTHROID) 125 MCG tablet Take 1 tablet (125 mcg total) by mouth once daily. 30 tablet 9    LYSINE ORAL Take by mouth 2 (two) times a day.      magnesium salicylate (DOANS PILLS ORAL) Take by mouth.      montelukast (SINGULAIR) 10 mg tablet TAKE ONE TABLET BY MOUTH DAILY 30 tablet 11    multivit/folic acid/vit K1 (ONE-A-DAY WOMEN'S 50 PLUS ORAL) Take by mouth 2 (two) times a day.      mv-mn/iron/folic acid/herb 190 (VITAMIN D3 COMPLETE ORAL) Vitamin D      polyethylene glycol 3350 (MIRALAX ORAL) Take by mouth.      RED YEAST RICE ORAL Take by mouth 2 (two) times a day.      sumatriptan (IMITREX) 100 MG tablet TAKE ONE TABLET BY  MOUTH DAILY AS NEEDED FOR MIGRAINE HEADACHE (MAY REPEAT DOSE AFTER 2 HOURS UP TO A MAXIMUM  MG IN 24 HOURS) 9 tablet 3    vit A/vit C/vit E/zinc/copper (PRESERVISION AREDS ORAL) PreserVision AREDS      [DISCONTINUED] UNABLE TO FIND Take 1 capsule by mouth once daily. medication name: K+2 Potassium         Social History     Socioeconomic History    Marital status: Single   Tobacco Use    Smoking status: Never    Smokeless tobacco: Never   Substance and Sexual Activity    Alcohol use: Never    Drug use: Never    Sexual activity: Not Currently     Social Determinants of Health     Financial Resource Strain: Low Risk  (7/15/2024)    Overall Financial Resource Strain (CARDIA)     Difficulty of Paying Living Expenses: Not hard at all   Food Insecurity: No Food Insecurity (7/15/2024)    Hunger Vital Sign     Worried About Running Out of Food in the Last Year: Never true     Ran Out of Food in the Last Year: Never true   Transportation Needs: No Transportation Needs (7/15/2024)    TRANSPORTATION NEEDS     Transportation : No   Physical Activity: Insufficiently Active (7/15/2024)    Exercise Vital Sign     Days of Exercise per Week: 2 days     Minutes of Exercise per Session: 10 min   Stress: No Stress Concern Present (7/15/2024)    [employer] - Occupational Stress Questionnaire     Feeling of Stress : Only a little   Housing Stability: Low Risk  (7/15/2024)    Housing Stability Vital Sign     Unable to Pay for Housing in the Last Year: No     Homeless in the Last Year: No        Family History   Problem Relation Name Age of Onset    Hyperlipidemia Mother      Lung cancer Mother      COPD Mother      Stroke Father      Hyperlipidemia Father      Breast cancer Maternal Grandmother      Glaucoma Paternal Aunt      Kidney cancer Cousin          Patient Care Team:  Latanya Taylor MD as PCP - General (Internal Medicine)  Marisol Griffin DPM as Consulting Physician (Podiatry)  Bang  "João DUPREE MD as Consulting Physician (Otolaryngology)  Haroon Monreal MD as Consulting Physician (Orthopedic Surgery)  Keyon Hernandez MD as Consulting Physician (Ophthalmology)  Kelvin Matias MD as Consulting Physician (Otolaryngology)  Barron Mabry Jr., MD as Consulting Physician (Pain Medicine)       Subjective:     Review of Systems   Constitutional:         She fell asleep on the exercise machine   HENT:          Congestion   Eyes:         She had injection in eye 2 months ago, she's going back in August, he wanted to see in 2 mo   Respiratory: Negative.     Cardiovascular:  Positive for leg swelling. Negative for chest pain and palpitations.   Gastrointestinal: Negative.    Musculoskeletal:  Positive for back pain and myalgias.        Muscle spasm in the thigh, she had first after she went to sleep on the exercises but then she tried it again and it started. She used heating pad.    Skin:  Negative for rash.   Neurological:         Balance is worse in the morning.    Endo/Heme/Allergies:         Sugars good       Objective:     /75 (BP Location: Left arm, Patient Position: Sitting, BP Method: Large (Manual))   Pulse 65   Temp 97.5 °F (36.4 °C)   Resp 18   Ht 5' 6" (1.676 m)   Wt 97.5 kg (215 lb)   SpO2 95%   BMI 34.70 kg/m²     Physical Exam  Vitals reviewed.   HENT:      Head: Normocephalic and atraumatic.      Mouth/Throat:      Mouth: Mucous membranes are moist.      Pharynx: No oropharyngeal exudate.   Eyes:      Extraocular Movements: Extraocular movements intact.      Conjunctiva/sclera: Conjunctivae normal.      Pupils: Pupils are equal, round, and reactive to light.   Cardiovascular:      Rate and Rhythm: Normal rate and regular rhythm.      Pulses:           Dorsalis pedis pulses are 3+ on the right side and 3+ on the left side.        Posterior tibial pulses are 3+ on the right side and 3+ on the left side.   Pulmonary:      Effort: Pulmonary effort is normal.      " Breath sounds: No wheezing.   Abdominal:      General: Bowel sounds are normal.      Palpations: Abdomen is soft.      Tenderness: There is no abdominal tenderness. There is no guarding.   Musculoskeletal:         General: Tenderness and deformity present.      Right foot: Decreased range of motion. Deformity present.      Left foot: Decreased range of motion. Deformity present.   Feet:      Right foot:      Protective Sensation: 10 sites tested.  10 sites sensed.      Skin integrity: Skin integrity normal.      Toenail Condition: Right toenails are long.      Left foot:      Protective Sensation: 10 sites tested.  8 sites sensed.      Skin integrity: Skin integrity normal.      Toenail Condition: Left toenails are long.      Comments: Sensation decreased in both feet, L>R pes planus, veins are dilated along feet  Skin:     General: Skin is warm and dry.      Coloration: Skin is pale.   Neurological:      General: No focal deficit present.      Mental Status: She is alert. Mental status is at baseline.      Motor: No weakness.   Psychiatric:         Mood and Affect: Mood normal.         Behavior: Behavior normal.         Thought Content: Thought content normal.         Judgment: Judgment normal.           A comprehensive HEALTH RISK ASSESSMENT was completed today. Results are summarized below:    There are NO EMOTIONAL/SOCIAL CONCERNS identified on today's screening for Social Isolation, Depression and Anxiety.    There are NO COGNITIVE FUNCTION CONCERNS identified on today's screening.  The following FUNCTIONAL AND/OR SAFETY CONCERNS were identified on today's screening for Physical Symptoms, Nutritional, Home Safety/Living Situation, Fall Risk, Activities of Daily Living, Independent Activities of Daily Living, Physical Activity,Timed Up and Go test and Whisper test::  *Patient reports recent HEARING/VISION DIFFICULTIES. (Have you noticed any hearing or vision difficulties?: (!) Yes)  *Patient reports PROBLEMS  WITH BLADDER FUNCTION. (Do you have any problems with urination like going too frequently, trouble urinating, leakage or accidents?: (!) Yes)  *Patient reports she has FALLEN TWO OR MORE TIMES IN THE PAST YEAR. (Have you fallen two or more times in the past year?: (!) Yes)  *Patient reports she NEEDS AMBULATION ASSISTANCE. (Do you use an assistance device to easily get around?: (!) Yes)(Ambulation Assistance: Cane)          The patient is NOT A TOBACCO USER.  The patient reports NO SIGNIFICANT ALCOHOL USE.     All Questions regarding food, transportation or housing were not answered today.  No visits with results within 1 Month(s) from this visit.   Latest known visit with results is:   Office Visit on 04/03/2024   Component Date Value    Vitamin D 07/10/2024 62     Sodium 07/10/2024 139     Potassium 07/10/2024 4.2     Chloride 07/10/2024 107     CO2 07/10/2024 26     Glucose 07/10/2024 108     Blood Urea Nitrogen 07/10/2024 20.1     Creatinine 07/10/2024 0.69     Calcium 07/10/2024 9.7     Protein Total 07/10/2024 6.1     Albumin 07/10/2024 3.7     Globulin 07/10/2024 2.4     Albumin/Globulin Ratio 07/10/2024 1.5     Bilirubin Total 07/10/2024 0.5     ALP 07/10/2024 93     ALT 07/10/2024 31     AST 07/10/2024 27     eGFR 07/10/2024 >60     Anion Gap 07/10/2024 6.0     BUN/Creatinine Ratio 07/10/2024 29     Hemoglobin A1c 07/10/2024 5.8     Estimated Average Glucose 07/10/2024 119.8     Cholesterol Total 07/10/2024 200     HDL Cholesterol 07/10/2024 52     Triglyceride 07/10/2024 92     Cholesterol/HDL Ratio 07/10/2024 4     Very Low Density Lipopro* 07/10/2024 18     LDL Cholesterol 07/10/2024 130.00     Iron Binding Capacity Un* 07/10/2024 299     Iron Level 07/10/2024 37 (L)     Transferrin 07/10/2024 317     Iron Binding Capacity To* 07/10/2024 336     Iron Saturation 07/10/2024 11 (L)     WBC 07/10/2024 9.98     RBC 07/10/2024 3.88 (L)     Hgb 07/10/2024 10.2 (L)     Hct 07/10/2024 32.4 (L)     MCV 07/10/2024  83.5     MCH 07/10/2024 26.3 (L)     MCHC 07/10/2024 31.5 (L)     RDW 07/10/2024 15.1     Platelet 07/10/2024 244     MPV 07/10/2024 10.1     Neut % 07/10/2024 62.1     Lymph % 07/10/2024 30.1     Mono % 07/10/2024 6.1     Eos % 07/10/2024 1.2     Basophil % 07/10/2024 0.3     Lymph # 07/10/2024 3.00     Neut # 07/10/2024 6.20     Mono # 07/10/2024 0.61     Eos # 07/10/2024 0.12     Baso # 07/10/2024 0.03     IG# 07/10/2024 0.02     IG% 07/10/2024 0.2        Assessment/Plan:     1. Medicare annual wellness visit, subsequent  Comments:  Limited mobility has decreased her activity but her sugars remain great    2. Essential hypertension  Comments:  Good control, high fall risk would not push further    3. Controlled type 2 diabetes mellitus without complication, without long-term current use of insulin  Comments:  A1C still great, will get urine today. Saw Retinal eye 2 mo ago  Orders:  -     Microalbumin/Creatinine Ratio, Urine  -     Urinalysis, Reflex to Urine Culture  -     gabapentin (NEURONTIN) 100 MG capsule; Take 1 capsule (100 mg total) by mouth 3 (three) times daily. For neuropathy  Dispense: 90 capsule; Refill: 2    4. Acquired hypothyroidism  Comments:  TSH due after September, she may need refills she just dropped the bottle at pharmacy    5. Mixed hyperlipidemia  Comments:  Slight increase but with current muscular issues will leave on Red Yeast Rice Extract    6. Asymptomatic varicose veins of both lower extremities  Comments:  stable, she's now unable to use the compression stocking with her back problems    7. Exudative age-related macular degeneration of left eye, unspecified stage  Comments:  Due visit [facility] in August, likely needs another shot    8. Vitamin D deficiency    9. Primary osteoarthritis involving multiple joints    10. Iron deficiency anemia due to chronic blood loss  Comments:  She had been doing [facility] which was found to have diclofenac in it.    11. Peripheral neuropathy due  to metabolic disorder  -     gabapentin (NEURONTIN) 100 MG capsule; Take 1 capsule (100 mg total) by mouth 3 (three) times daily. For neuropathy  Dispense: 90 capsule; Refill: 2           Medicare Annual Wellness and Personalized Prevention Plan:   Fall Risk + Home Safety + Hearing Impairment + Depression Screen + Opioid and Substance Abuse Screening + Cognitive Impairment Screen + Health Risk Assessment all reviewed.     Health Maintenance Topics with due status: Not Due       Topic Last Completion Date    Influenza Vaccine 11/02/2020    DEXA Scan 01/11/2022    Lipid Panel 07/10/2024    Hemoglobin A1c 07/10/2024      The patient's Health Maintenance was reviewed and the following appears to be due at this time:   Health Maintenance Due   Topic Date Due    TETANUS VACCINE  Never done    Diabetes Urine Screening  07/28/2023    Eye Exam  02/16/2024    Foot Exam  05/16/2024       Advance Care Planning   I attest to discussing Advance Care Planning with patient and/or family member.  Education was provided including the importance of the Health Care Power of , Advance Directives, and/or [place] documentation.  The patient expressed understanding to the importance of this information and discussion.         Follow up in about 3 months (around 10/15/2024) for Recheck. In addition to their scheduled follow up, the patient has also been instructed to follow up on as needed basis.

## 2024-07-15 NOTE — PATIENT INSTRUCTIONS
Federico Pierce,     If you are due for any health screening(s) below please notify me so we can arrange them to be ordered and scheduled. Most healthy patients at your age complete them, but you are free to accept or refuse.     If you can't do it, I'll definitely understand. If you can, I'd certainly appreciate it!    Tests to Keep You Healthy    Eye Exam: DUE  Last Blood Pressure <= 139/89 (4/3/2024): NO      Your diabetic retinal eye exam is due     Diabetes is the #1 cause of blindness in the [place] - early detection before signs or symptoms develop can prevent debilitating blindness.     Our records indicate that you may be overdue for your annual diabetic eye exam. Eye screening can help identify patients at risk for developing vision loss which is common in diabetes. This simple screening is an important step to keeping you healthy and preventing complications from diabetes.     This recommended diabetic eye exam should take place once per year and can prevent and treat diabetes complications in the eye before developing symptoms. This can be done with a special camera is used to take photographs of the back of your eye without having to dilate them, or you can see an eye doctor for a full dilated exam.     If you recently had your yearly diabetic eye exam performed outside of [facility], please let your Health care team know so that they can update your health record.         5-10 year preventative plan discussed

## 2024-07-17 ENCOUNTER — TELEPHONE (OUTPATIENT)
Dept: PRIMARY CARE CLINIC | Facility: CLINIC | Age: 79
End: 2024-07-17
Payer: MEDICARE

## 2024-07-17 LAB
ALBUMIN/CREAT UR: NORMAL MG/G CREAT (ref 0–29)
APPEARANCE UR: CLEAR
BACTERIA #/AREA URNS HPF: ABNORMAL /[HPF]
BACTERIA UR CULT: NORMAL
BACTERIA UR CULT: NORMAL
BILIRUB UR QL STRIP: NEGATIVE
COLOR UR: YELLOW
CREAT UR-MCNC: 92.1 MG/DL
CRYSTALS URNS MICRO: ABNORMAL
EPI CELLS #/AREA URNS HPF: >10 /HPF (ref 0–10)
GLUCOSE UR QL STRIP: NEGATIVE
HGB UR QL STRIP: NEGATIVE
KETONES UR QL STRIP: NEGATIVE
LEUKOCYTE ESTERASE UR QL STRIP: ABNORMAL
MICRO URNS: ABNORMAL
MICROALBUMIN UR-MCNC: <12 UG/ML
NITRITE UR QL STRIP: NEGATIVE
PH UR STRIP: 6.5 [PH] (ref 5–7.5)
PROT UR QL STRIP: NEGATIVE
RBC #/AREA URNS HPF: ABNORMAL /HPF (ref 0–2)
SP GR UR STRIP: 1.02 (ref 1–1.03)
URINALYSIS REFLEX: ABNORMAL
UROBILINOGEN UR STRIP-MCNC: 1 MG/DL (ref 0.2–1)
WBC #/AREA URNS HPF: ABNORMAL /HPF (ref 0–5)

## 2024-07-17 NOTE — TELEPHONE ENCOUNTER
----- Message from Latanya Taylor MD sent at 7/17/2024  2:09 PM CDT -----  The urine was clear and no sign of damage from diabetes. Veronica tried to call to get the recipe for that banana bread, I remembered what was in it but not how much.

## 2024-08-06 ENCOUNTER — OFFICE VISIT (OUTPATIENT)
Dept: PRIMARY CARE CLINIC | Facility: CLINIC | Age: 79
End: 2024-08-06
Payer: MEDICARE

## 2024-08-06 VITALS
BODY MASS INDEX: 33.11 KG/M2 | WEIGHT: 206 LBS | RESPIRATION RATE: 16 BRPM | SYSTOLIC BLOOD PRESSURE: 124 MMHG | HEART RATE: 70 BPM | OXYGEN SATURATION: 95 % | DIASTOLIC BLOOD PRESSURE: 68 MMHG | TEMPERATURE: 98 F | HEIGHT: 66 IN

## 2024-08-06 DIAGNOSIS — J00 ACUTE NASOPHARYNGITIS: Primary | ICD-10-CM

## 2024-08-06 DIAGNOSIS — R29.898 WEAKNESS OF LOWER EXTREMITY, UNSPECIFIED LATERALITY: ICD-10-CM

## 2024-08-06 PROCEDURE — 99214 OFFICE O/P EST MOD 30 MIN: CPT | Mod: ,,, | Performed by: INTERNAL MEDICINE

## 2024-08-06 RX ORDER — AZITHROMYCIN 250 MG/1
TABLET, FILM COATED ORAL
Qty: 6 TABLET | Refills: 0 | Status: SHIPPED | OUTPATIENT
Start: 2024-08-06

## 2024-08-12 ENCOUNTER — TELEPHONE (OUTPATIENT)
Dept: PRIMARY CARE CLINIC | Facility: CLINIC | Age: 79
End: 2024-08-12
Payer: MEDICARE

## 2024-08-12 RX ORDER — ALBUTEROL SULFATE 90 UG/1
2 INHALANT RESPIRATORY (INHALATION) EVERY 6 HOURS PRN
Qty: 8 G | Refills: 2 | Status: SHIPPED | OUTPATIENT
Start: 2024-08-12

## 2024-08-12 NOTE — TELEPHONE ENCOUNTER
Is there increased color coming up? We don't have a lot for cough since she is allergic to Meperidine, it blocks all the stronger cough medications as cross reacting. If she hears wheezing I could add an inhaler. Has she tried Nyquil over the counter?

## 2024-08-12 NOTE — TELEPHONE ENCOUNTER
----- Message from Chadd Schuster sent at 8/12/2024  9:41 AM CDT -----  Who Called: Kari Gomez    Caller is requesting assistance/information from provider's office.    Symptoms (please be specific): cough   How long has patient had these symptoms:    List of preferred pharmacies on file (remove unneeded): [facility] - [place], [place] - [address] [place] [address].   Phone: [phone]  Fax: [phone]        If different, enter pharmacy into here including location and phone number:       Preferred Method of Contact: Phone Call  Patient's Preferred Phone Number on File: [phone]   Best Call Back Number, if different:[phone]  Additional Information: Pt states she has a lingering cough from covid, stated she has finished her medication prescribed but the cough has not gotten any better. Pt would like a different medication called in if possible.

## 2024-08-19 ENCOUNTER — TELEPHONE (OUTPATIENT)
Dept: PRIMARY CARE CLINIC | Facility: CLINIC | Age: 79
End: 2024-08-19
Payer: MEDICARE

## 2024-08-19 ENCOUNTER — HOSPITAL ENCOUNTER (OUTPATIENT)
Dept: RADIOLOGY | Facility: HOSPITAL | Age: 79
Discharge: HOME | End: 2024-08-19
Attending: INTERNAL MEDICINE
Payer: MEDICARE

## 2024-08-19 DIAGNOSIS — R05.9 COUGH, UNSPECIFIED TYPE: Primary | ICD-10-CM

## 2024-08-19 DIAGNOSIS — R05.9 COUGH, UNSPECIFIED TYPE: ICD-10-CM

## 2024-08-19 PROCEDURE — 71046 X-RAY EXAM CHEST 2 VIEWS: CPT | Mod: TC

## 2024-08-19 RX ORDER — AMOXICILLIN AND CLAVULANATE POTASSIUM 875; 125 MG/1; MG/1
1 TABLET, FILM COATED ORAL 2 TIMES DAILY
Qty: 20 TABLET | Refills: 0 | Status: SHIPPED | OUTPATIENT
Start: 2024-08-19 | End: 2024-08-29

## 2024-08-19 NOTE — TELEPHONE ENCOUNTER
Spoke to patient states she is taking mucinex and albuterol inhaler still coughing up whitish to grey mucus also complain of pain on left side its not rib area more in lung area.

## 2024-08-19 NOTE — TELEPHONE ENCOUNTER
----- Message from Latanya Taylor MD sent at 8/19/2024  4:26 PM CDT -----  No pneumonia, it looks ok.

## 2024-08-19 NOTE — TELEPHONE ENCOUNTER
----- Message from Jazmyne Shrestha sent at 8/19/2024  9:54 AM CDT -----  .Who Called: Kari Gomez    Caller is requesting assistance/information from provider's office.    Symptoms (please be specific): Ongoing Cough    How long has patient had these symptoms:  2 weeks   List of preferred pharmacies on file (remove unneeded):       Preferred Method of Contact: Phone Call  Patient's Preferred Phone Number on File: [phone]   Best Call Back Number, if different:or [phone]  Additional Information: Pt stated she had covid for two weeks and has been taking albuterol as well as an allergy medication and Mucinex daily and has not been able to get rid of this cough. Pt is requesting to speak with a nurse. Offered an appointment for Wednesday and Pt would like to be seen sooner . Please call to advise

## 2024-08-20 NOTE — PROGRESS NOTES
Latanya Taylor MD   [address] [address]     Patient ID: [id number]     Chief Complaint: Follow up for coughing        HPI:     Kari Gomez is a 79 y.o. female here today for a persistent cough. She started with issues around 8/2/24 after taking her communion rounds . She's not sleeping well. She coughs all night. If she sleeps in chair she coughs less. She is taking her Xyzal and montelukast. The podiatrist suggested taking Vitamin C and Zinc.  She has restarted it to see. The Augmentin makes her cough a lot up.       Subjective:     Review of Systems   Respiratory:  Positive for cough and sputum production.    Cardiovascular: Negative.    Musculoskeletal:  Negative for falls.   Skin: Negative.        Past Medical History:   Diagnosis Date    Allergic sinusitis     Arthritis     Combined hyperlipidemia     Disc degeneration, lumbar     Diverticula of colon     DJD (degenerative joint disease)     External hemorrhoid     Fracture of fibula 05/09/2022    right      Fracture, fibula     Headache, chronic migraine without aura     HTN (hypertension)     Hypothyroidism     Macular degeneration     Neuropathy     Vitamin D deficiency         Past Surgical History:   Procedure Laterality Date    BREAST BIOPSY      BUNIONECTOMY      CATARACT EXTRACTION, BILATERAL      EYE SURGERY      MACULAR DEGENERATION    FRACTURE SURGERY Left 2021    OPEN REDUCTION AND INTERNAL FIXATION (ORIF) OF FRACTURE OF DISTAL RADIUS  10/01/2021    OPEN REDUCTION AND INTERNAL FIXATION (ORIF) OF INJURY OF ANKLE Right 02/01/2019       Family History   Problem Relation Name Age of Onset    Hyperlipidemia Mother      Lung cancer Mother      COPD Mother      Stroke Father      Hyperlipidemia Father      Breast cancer Maternal Grandmother      Glaucoma Paternal Aunt      Kidney cancer Cousin          Social History     Socioeconomic History    Marital status: Single   Tobacco Use    Smoking status: Never    Smokeless tobacco: Never    Substance and Sexual Activity    Alcohol use: Never    Drug use: Never    Sexual activity: Not Currently     Social Determinants of Health     Financial Resource Strain: Low Risk  (7/15/2024)    Overall Financial Resource Strain (CARDIA)     Difficulty of Paying Living Expenses: Not hard at all   Food Insecurity: No Food Insecurity (7/15/2024)    Hunger Vital Sign     Worried About Running Out of Food in the Last Year: Never true     Ran Out of Food in the Last Year: Never true   Transportation Needs: No Transportation Needs (7/15/2024)    TRANSPORTATION NEEDS     Transportation : No   Physical Activity: Insufficiently Active (7/15/2024)    Exercise Vital Sign     Days of Exercise per Week: 2 days     Minutes of Exercise per Session: 10 min   Stress: No Stress Concern Present (7/15/2024)    [employer] - Occupational Stress Questionnaire     Feeling of Stress : Only a little   Housing Stability: Low Risk  (7/15/2024)    Housing Stability Vital Sign     Unable to Pay for Housing in the Last Year: No     Homeless in the Last Year: No       Review of patient's allergies indicates:   Allergen Reactions    Meperidine      Other reaction(s): doctor said pt is allergic  Other reaction(s): Not Indicated       Outpatient Medications Marked as Taking for the 8/21/24 encounter (Office Visit) with Latanya Taylor MD   Medication Sig Dispense Refill    acetaminophen (TYLENOL ARTHRITIS PAIN ORAL) Take by mouth daily as needed.      albuterol (PROAIR HFA) 90 mcg/actuation inhaler Inhale 2 puffs into the lungs every 6 (six) hours as needed for Wheezing. Covid associated bronchospasm 8 g 2    alendronate (FOSAMAX) 70 MG tablet TAKE ONE TABLET BY MOUTH ONCE A WEEK 4 tablet 4    amoxicillin-clavulanate 875-125mg (AUGMENTIN) 875-125 mg per tablet Take 1 tablet by mouth 2 (two) times daily. for 10 days 20 tablet 0    ascorbic acid (VITAMIN C ORAL) Take by mouth.      calcium carbonate/vitamin D3 (VITAMIN  D-3 ORAL) Take 2,000 mg by mouth Daily.      CALCIUM-MAGNESIUM ORAL Take by mouth once daily at 6am.      cholecalciferol, vitamin D3, (VITAMIN D3) 50 mcg (2,000 unit) Cap capsule Take 4,000 Units by mouth once daily.      cyanocobalamin, vitamin B-12, (VITAMIN B-12 ORAL) Take by mouth once daily at 6am.      diltiaZEM (CARDIZEM CD) 240 MG 24 hr capsule TAKE ONE CAPSULE BY MOUTH DAILY 30 capsule 5    docusate sodium (COLACE) 100 MG capsule Take 100 mg by mouth 2 (two) times daily.      DULoxetine (CYMBALTA) 30 MG capsule Take 1 capsule (30 mg total) by mouth once daily. For neuropathy and mood 30 capsule 11    ECHINACEA 1X ORAL Take by mouth once daily at 6am.      ferrous fumarate/vit Bcomp,C (SUPER B COMPLEX ORAL) Take by mouth.      fluticasone propionate (FLONASE) 50 mcg/actuation nasal spray 1 spray by Each Nostril route 2 (two) times daily.      gabapentin (NEURONTIN) 100 MG capsule Take 1 capsule (100 mg total) by mouth 3 (three) times daily. For neuropathy 90 capsule 2    hydroCHLOROthiazide (HYDRODIURIL) 12.5 MG Tab Take 1 tablet (12.5 mg total) by mouth every other day. 30 tablet 5    ipratropium (ATROVENT) 42 mcg (0.06 %) nasal spray SMARTSI Spray(s) By Mouth 3 Times Daily PRN      ketorolac (TORADOL) 10 mg tablet TAKE ONE TABLET BY MOUTH EVERY FOUR HOURS AS NEEDED FOR PAIN. MAXIMUM OF FOUR TABLETS PER DAY FOR FIVE DAYS 20 tablet 1    levocetirizine (XYZAL) 5 MG tablet Take 1 tablet (5 mg total) by mouth every evening. 30 tablet 5    levothyroxine (SYNTHROID) 125 MCG tablet Take 1 tablet (125 mcg total) by mouth once daily. 30 tablet 9    LYSINE ORAL Take by mouth 2 (two) times a day.      magnesium salicylate (DOANS PILLS ORAL) Take by mouth.      montelukast (SINGULAIR) 10 mg tablet TAKE ONE TABLET BY MOUTH DAILY 30 tablet 11    multivit/folic acid/vit K1 (ONE-A-DAY WOMEN'S 50 PLUS ORAL) Take by mouth 2 (two) times a day.      mv-mn/iron/folic acid/herb 190 (VITAMIN D3 COMPLETE ORAL) Vitamin D       "polyethylene glycol 3350 (MIRALAX ORAL) Take by mouth.      RED YEAST RICE ORAL Take by mouth 2 (two) times a day.      sumatriptan (IMITREX) 100 MG tablet TAKE ONE TABLET BY MOUTH DAILY AS NEEDED FOR MIGRAINE HEADACHE (MAY REPEAT DOSE AFTER 2 HOURS UP TO A MAXIMUM  MG IN 24 HOURS) 9 tablet 3    vit A/vit C/vit E/zinc/copper (PRESERVISION AREDS ORAL) PreserVision AREDS         Patient Care Team:  Latanya Taylor MD as PCP - General (Internal Medicine)  Marisol Griffin DPM as Consulting Physician (Podiatry)  João Cuenca MD as Consulting Physician (Otolaryngology)  Haroon Monreal MD as Consulting Physician (Orthopedic Surgery)  Keyon Hernandez MD as Consulting Physician (Ophthalmology)  Kelvin Matias MD as Consulting Physician (Otolaryngology)  Barron Mabry Jr., MD as Consulting Physician (Pain Medicine)       Objective:     BP (!) 141/66   Pulse 74   Temp 97.9 °F (36.6 °C) (Oral)   Resp 16   Ht 5' 6" (1.676 m)   Wt 93.4 kg (206 lb)   SpO2 95%   BMI 33.25 kg/m²     Physical Exam  Vitals reviewed.   HENT:      Ears:      Comments: TM's full     Mouth/Throat:      Mouth: Mucous membranes are moist.      Pharynx: No oropharyngeal exudate or posterior oropharyngeal erythema.   Eyes:      General: No scleral icterus.     Extraocular Movements: Extraocular movements intact.   Cardiovascular:      Rate and Rhythm: Normal rate and regular rhythm.   Pulmonary:      Breath sounds: No wheezing or rhonchi.      Comments: Cough late during the visit  Neurological:      Mental Status: She is alert.               Assessment/Plan:     1. Post-COVID chronic cough  Comments:  Can take extra Xyzal but it may cause congestion             Follow up for scheduled visit in Oct. In addition to their scheduled follow up, the patient has also been instructed to follow up on as needed basis.     Signature:  Latanya Taylor MD  [facility]  [address] [address]    "

## 2024-08-21 ENCOUNTER — OFFICE VISIT (OUTPATIENT)
Dept: PRIMARY CARE CLINIC | Facility: CLINIC | Age: 79
End: 2024-08-21
Payer: MEDICARE

## 2024-08-21 VITALS
HEART RATE: 74 BPM | DIASTOLIC BLOOD PRESSURE: 66 MMHG | OXYGEN SATURATION: 95 % | WEIGHT: 206 LBS | HEIGHT: 66 IN | RESPIRATION RATE: 16 BRPM | SYSTOLIC BLOOD PRESSURE: 141 MMHG | TEMPERATURE: 98 F | BODY MASS INDEX: 33.11 KG/M2

## 2024-08-21 DIAGNOSIS — R05.3 POST-COVID CHRONIC COUGH: Primary | ICD-10-CM

## 2024-08-21 DIAGNOSIS — U09.9 POST-COVID CHRONIC COUGH: Primary | ICD-10-CM

## 2024-08-21 PROCEDURE — 99213 OFFICE O/P EST LOW 20 MIN: CPT | Mod: ,,, | Performed by: INTERNAL MEDICINE

## 2024-08-22 RX ORDER — ALENDRONATE SODIUM 70 MG/1
TABLET ORAL
Qty: 4 TABLET | Refills: 4 | Status: SHIPPED | OUTPATIENT
Start: 2024-08-22

## 2024-09-16 ENCOUNTER — TELEPHONE (OUTPATIENT)
Dept: PRIMARY CARE CLINIC | Facility: CLINIC | Age: 79
End: 2024-09-16
Payer: MEDICARE

## 2024-09-16 RX ORDER — LEVOTHYROXINE SODIUM 125 UG/1
125 TABLET ORAL
Qty: 30 TABLET | Refills: 2 | Status: SHIPPED | OUTPATIENT
Start: 2024-09-16

## 2024-09-16 NOTE — TELEPHONE ENCOUNTER
----- Message from Chadd Schuster sent at 9/16/2024  1:34 PM CDT -----  Who Called: Kari Gomez    Caller is requesting assistance/information from provider's office.    Symptoms (please be specific): dizziness   How long has patient had these symptoms:    List of preferred pharmacies on file (remove unneeded): [unfilled]  If different, enter pharmacy into here including location and phone number:       Preferred Method of Contact: Phone Call  Patient's Preferred Phone Number on File: [phone]   Best Call Back Number, if different:[phone]  Additional Information: Pt is requesting a call form nurse states she experienced dizziness last week due to missing a dosage because of not having any more medication states she received her refill, but she is scared to take medication due to what she experienced. Pt would like to hear from nurse that she in fact has to take the medication 3x a day.

## 2024-09-16 NOTE — TELEPHONE ENCOUNTER
Spoke to patient  complain of dizziness which she never had before she started the gabapentin 100 mg I TID states she stop taking it should she restart it.The day she complain of dizziness she had not taken the medication.

## 2024-10-09 ENCOUNTER — TELEPHONE (OUTPATIENT)
Dept: PRIMARY CARE CLINIC | Facility: CLINIC | Age: 79
End: 2024-10-09
Payer: MEDICARE

## 2024-10-15 ENCOUNTER — OFFICE VISIT (OUTPATIENT)
Dept: PRIMARY CARE CLINIC | Facility: CLINIC | Age: 79
End: 2024-10-15
Payer: MEDICARE

## 2024-10-15 VITALS
HEIGHT: 66 IN | BODY MASS INDEX: 32.95 KG/M2 | HEART RATE: 69 BPM | TEMPERATURE: 98 F | WEIGHT: 205 LBS | RESPIRATION RATE: 16 BRPM | DIASTOLIC BLOOD PRESSURE: 66 MMHG | OXYGEN SATURATION: 95 % | SYSTOLIC BLOOD PRESSURE: 139 MMHG

## 2024-10-15 DIAGNOSIS — H35.3220 EXUDATIVE AGE-RELATED MACULAR DEGENERATION OF LEFT EYE, UNSPECIFIED STAGE: ICD-10-CM

## 2024-10-15 DIAGNOSIS — I10 ESSENTIAL HYPERTENSION: Primary | ICD-10-CM

## 2024-10-15 DIAGNOSIS — E11.9 CONTROLLED TYPE 2 DIABETES MELLITUS WITHOUT COMPLICATION, WITHOUT LONG-TERM CURRENT USE OF INSULIN: ICD-10-CM

## 2024-10-15 DIAGNOSIS — F43.21 SITUATIONAL DEPRESSION: ICD-10-CM

## 2024-10-15 DIAGNOSIS — E03.9 ACQUIRED HYPOTHYROIDISM: ICD-10-CM

## 2024-10-15 DIAGNOSIS — Z78.0 POST-MENOPAUSAL: ICD-10-CM

## 2024-10-15 PROCEDURE — 36415 COLL VENOUS BLD VENIPUNCTURE: CPT | Mod: ,,, | Performed by: INTERNAL MEDICINE

## 2024-10-15 PROCEDURE — 99214 OFFICE O/P EST MOD 30 MIN: CPT | Mod: ,,, | Performed by: INTERNAL MEDICINE

## 2024-10-15 NOTE — PROGRESS NOTES
Latanya Taylor MD   [address] [address]     Patient ID: [id number]     Chief Complaint: Follow-up (3 month fu)        HPI:     Kari Gomez is a 79 y.o. female here today for a follow up of DM, HTN and DJD. She's feeling depressed as her 95 year old is in hospital after a fall. She had argued with the sitter about parking where no one can get in the driveway. She tried to tell her she could not come visit when she was there.       Subjective:     Review of Systems   HENT:          Saliva leaks when she talks since she had a fall hitting her chin. It's embarrassing her. She got an ulcer on her tongue   Eyes:         Eye was late with Covid, she does every three months   Respiratory: Negative.          Cough finally resolved with post Covid issues.    Cardiovascular: Negative.    Musculoskeletal:  Negative for falls.        Foot has been doing better, she's doing a prayer for healing for it   Skin:  Positive for itching.        At times she will itch   Psychiatric/Behavioral:  Positive for depression.         Poor sleep       Past Medical History:   Diagnosis Date    Allergic sinusitis     Arthritis     Combined hyperlipidemia     Disc degeneration, lumbar     Diverticula of colon     DJD (degenerative joint disease)     External hemorrhoid     Fracture of fibula 05/09/2022    right      Fracture, fibula     Headache, chronic migraine without aura     HTN (hypertension)     Hypothyroidism     Macular degeneration     Neuropathy     Vitamin D deficiency         Past Surgical History:   Procedure Laterality Date    BREAST BIOPSY      BUNIONECTOMY      CATARACT EXTRACTION, BILATERAL      EYE SURGERY      MACULAR DEGENERATION    FRACTURE SURGERY Left 2021    OPEN REDUCTION AND INTERNAL FIXATION (ORIF) OF FRACTURE OF DISTAL RADIUS  10/01/2021    OPEN REDUCTION AND INTERNAL FIXATION (ORIF) OF INJURY OF ANKLE Right 02/01/2019       Family History   Problem Relation Name Age of Onset    Hyperlipidemia Mother       Lung cancer Mother      COPD Mother      Stroke Father      Hyperlipidemia Father      Breast cancer Maternal Grandmother      Glaucoma Paternal Aunt      Kidney cancer Cousin          Social History     Socioeconomic History    Marital status: Single   Tobacco Use    Smoking status: Never    Smokeless tobacco: Never   Substance and Sexual Activity    Alcohol use: Never    Drug use: Never    Sexual activity: Not Currently     Social Drivers of Health     Financial Resource Strain: Low Risk  (7/15/2024)    Overall Financial Resource Strain (CARDIA)     Difficulty of Paying Living Expenses: Not hard at all   Food Insecurity: No Food Insecurity (7/15/2024)    Hunger Vital Sign     Worried About Running Out of Food in the Last Year: Never true     Ran Out of Food in the Last Year: Never true   Transportation Needs: No Transportation Needs (7/15/2024)    TRANSPORTATION NEEDS     Transportation : No   Physical Activity: Insufficiently Active (7/15/2024)    Exercise Vital Sign     Days of Exercise per Week: 2 days     Minutes of Exercise per Session: 10 min   Stress: No Stress Concern Present (7/15/2024)    [employer] - Occupational Stress Questionnaire     Feeling of Stress : Only a little   Housing Stability: Low Risk  (7/15/2024)    Housing Stability Vital Sign     Unable to Pay for Housing in the Last Year: No     Homeless in the Last Year: No       Review of patient's allergies indicates:   Allergen Reactions    Meperidine      Other reaction(s): doctor said pt is allergic  Other reaction(s): Not Indicated       Outpatient Medications Marked as Taking for the 10/15/24 encounter (Office Visit) with Latanya Taylor MD   Medication Sig Dispense Refill    acetaminophen (TYLENOL ARTHRITIS PAIN ORAL) Take by mouth daily as needed.      albuterol (PROAIR HFA) 90 mcg/actuation inhaler Inhale 2 puffs into the lungs every 6 (six) hours as needed for Wheezing. Covid associated bronchospasm 8 g  2    alendronate (FOSAMAX) 70 MG tablet TAKE ONE TABLET BY MOUTH ONCE A WEEK 4 tablet 4    ascorbic acid (VITAMIN C ORAL) Take by mouth.      calcium carbonate/vitamin D3 (VITAMIN D-3 ORAL) Take 2,000 mg by mouth Daily.      CALCIUM-MAGNESIUM ORAL Take by mouth once daily at 6am.      cholecalciferol, vitamin D3, (VITAMIN D3) 50 mcg (2,000 unit) Cap capsule Take 4,000 Units by mouth once daily.      cyanocobalamin, vitamin B-12, (VITAMIN B-12 ORAL) Take by mouth once daily at 6am.      diltiaZEM (CARDIZEM CD) 240 MG 24 hr capsule TAKE ONE CAPSULE BY MOUTH DAILY 30 capsule 5    docusate sodium (COLACE) 100 MG capsule Take 100 mg by mouth 2 (two) times daily.      DULoxetine (CYMBALTA) 30 MG capsule Take 1 capsule (30 mg total) by mouth once daily. For neuropathy and mood 30 capsule 11    ECHINACEA 1X ORAL Take by mouth once daily at 6am.      ferrous fumarate/vit Bcomp,C (SUPER B COMPLEX ORAL) Take by mouth.      fluticasone propionate (FLONASE) 50 mcg/actuation nasal spray 1 spray by Each Nostril route 2 (two) times daily.      hydroCHLOROthiazide (HYDRODIURIL) 12.5 MG Tab Take 1 tablet (12.5 mg total) by mouth every other day. 30 tablet 5    ipratropium (ATROVENT) 42 mcg (0.06 %) nasal spray SMARTSI Spray(s) By Mouth 3 Times Daily PRN      ketorolac (TORADOL) 10 mg tablet TAKE ONE TABLET BY MOUTH EVERY FOUR HOURS AS NEEDED FOR PAIN. MAXIMUM OF FOUR TABLETS PER DAY FOR FIVE DAYS 20 tablet 1    levocetirizine (XYZAL) 5 MG tablet Take 1 tablet (5 mg total) by mouth every evening. 30 tablet 5    levothyroxine (SYNTHROID) 125 MCG tablet TAKE ONE TABLET BY MOUTH ONCE DAILY 30 tablet 2    LYSINE ORAL Take by mouth 2 (two) times a day.      magnesium salicylate (DOANS PILLS ORAL) Take by mouth.      montelukast (SINGULAIR) 10 mg tablet TAKE ONE TABLET BY MOUTH DAILY 30 tablet 11    multivit/folic acid/vit K1 (ONE-A-DAY WOMEN'S 50 PLUS ORAL) Take by mouth 2 (two) times a day.      mv-mn/iron/folic acid/herb 190 (VITAMIN  "D3 COMPLETE ORAL) Vitamin D      polyethylene glycol 3350 (MIRALAX ORAL) Take by mouth.      RED YEAST RICE ORAL Take by mouth 2 (two) times a day.      sumatriptan (IMITREX) 100 MG tablet TAKE ONE TABLET BY MOUTH DAILY AS NEEDED FOR MIGRAINE HEADACHE (MAY REPEAT DOSE AFTER 2 HOURS UP TO A MAXIMUM  MG IN 24 HOURS) 9 tablet 3    vit A/vit C/vit E/zinc/copper (PRESERVISION AREDS ORAL) PreserVision AREDS         Patient Care Team:  Latanya Taylor MD as PCP - General (Internal Medicine)  Marisol Griffin DPM as Consulting Physician (Podiatry)  João Cuenca MD as Consulting Physician (Otolaryngology)  Haroon Monreal MD as Consulting Physician (Orthopedic Surgery)  Keyon Hernandez MD as Consulting Physician (Ophthalmology)  Kelvin Matias MD as Consulting Physician (Otolaryngology)  Barron Mabry Jr., MD as Consulting Physician (Pain Medicine)       Objective:     /66   Pulse 69   Temp 98.2 °F (36.8 °C)   Resp 16   Ht 5' 6" (1.676 m)   Wt 93 kg (205 lb)   SpO2 95%   BMI 33.09 kg/m²     Physical Exam  Vitals reviewed.   Cardiovascular:      Rate and Rhythm: Normal rate and regular rhythm.   Pulmonary:      Effort: Pulmonary effort is normal.      Breath sounds: Normal breath sounds.   Neurological:      General: No focal deficit present.      Mental Status: She is alert.   Psychiatric:         Mood and Affect: Mood normal.         Behavior: Behavior normal.         Thought Content: Thought content normal.         Judgment: Judgment normal.      Comments: Mood comes up with talking               Assessment/Plan:     1. Essential hypertension    2. Controlled type 2 diabetes mellitus without complication, without long-term current use of insulin    3. Exudative age-related macular degeneration of left eye, unspecified stage  Comments:  Vision stable with injections    4. Situational depression  Comments:  She has been having poor sleep, will not add since she is already on " gabapentin             Follow up in about 3 months (around 1/15/2025) for Medication Managment. In addition to their scheduled follow up, the patient has also been instructed to follow up on as needed basis.     Signature:  Latanya Taylor MD  [facility]  [id number] [address]

## 2024-10-16 ENCOUNTER — TELEPHONE (OUTPATIENT)
Dept: PRIMARY CARE CLINIC | Facility: CLINIC | Age: 79
End: 2024-10-16
Payer: MEDICARE

## 2024-10-16 LAB
BUN SERPL-MCNC: 17 MG/DL (ref 8–27)
BUN/CREAT SERPL: 22 (ref 12–28)
CALCIUM SERPL-MCNC: 10.2 MG/DL (ref 8.7–10.3)
CHLORIDE SERPL-SCNC: 102 MMOL/L (ref 96–106)
CO2 SERPL-SCNC: 22 MMOL/L (ref 20–29)
CREAT SERPL-MCNC: 0.78 MG/DL (ref 0.57–1)
EST. GFR  (NO RACE VARIABLE): 77 ML/MIN/1.73
GLUCOSE SERPL-MCNC: 103 MG/DL (ref 70–99)
HBA1C MFR BLD: 5.8 % (ref 4.8–5.6)
POTASSIUM SERPL-SCNC: 4.2 MMOL/L (ref 3.5–5.2)
SODIUM SERPL-SCNC: 140 MMOL/L (ref 134–144)
TSH SERPL DL<=0.005 MIU/L-ACNC: 2.7 UIU/ML (ref 0.45–4.5)

## 2024-10-16 NOTE — TELEPHONE ENCOUNTER
----- Message from Latanya Taylor MD sent at 10/16/2024 12:33 PM CDT -----  The sugar and thyroid look good. Thank her for the cake and jellys!

## 2024-10-23 RX ORDER — DILTIAZEM HYDROCHLORIDE 240 MG/1
240 CAPSULE, COATED, EXTENDED RELEASE ORAL
Qty: 30 CAPSULE | Refills: 5 | Status: SHIPPED | OUTPATIENT
Start: 2024-10-23

## 2024-10-23 RX ORDER — DULOXETIN HYDROCHLORIDE 30 MG/1
CAPSULE, DELAYED RELEASE ORAL
Qty: 30 CAPSULE | Refills: 11 | Status: SHIPPED | OUTPATIENT
Start: 2024-10-23

## 2024-10-25 ENCOUNTER — HOSPITAL ENCOUNTER (OUTPATIENT)
Dept: RADIOLOGY | Facility: HOSPITAL | Age: 79
Discharge: HOME | End: 2024-10-25
Attending: INTERNAL MEDICINE
Payer: MEDICARE

## 2024-10-25 DIAGNOSIS — Z78.0 POST-MENOPAUSAL: ICD-10-CM

## 2024-10-25 PROCEDURE — 77080 DXA BONE DENSITY AXIAL: CPT | Mod: TC

## 2024-10-28 ENCOUNTER — TELEPHONE (OUTPATIENT)
Dept: PRIMARY CARE CLINIC | Facility: CLINIC | Age: 79
End: 2024-10-28
Payer: MEDICARE

## 2024-11-25 RX ORDER — BENZONATATE 200 MG/1
CAPSULE ORAL
Qty: 30 CAPSULE | Refills: 5 | Status: SHIPPED | OUTPATIENT
Start: 2024-11-25

## 2024-12-09 DIAGNOSIS — E11.9 CONTROLLED TYPE 2 DIABETES MELLITUS WITHOUT COMPLICATION, WITHOUT LONG-TERM CURRENT USE OF INSULIN: ICD-10-CM

## 2024-12-09 DIAGNOSIS — G63 PERIPHERAL NEUROPATHY DUE TO METABOLIC DISORDER: ICD-10-CM

## 2024-12-09 DIAGNOSIS — E88.9 PERIPHERAL NEUROPATHY DUE TO METABOLIC DISORDER: ICD-10-CM

## 2024-12-09 RX ORDER — GABAPENTIN 100 MG/1
CAPSULE ORAL
Qty: 90 CAPSULE | Refills: 2 | Status: SHIPPED | OUTPATIENT
Start: 2024-12-09

## 2024-12-09 RX ORDER — LEVOTHYROXINE SODIUM 125 UG/1
125 TABLET ORAL
Qty: 30 TABLET | Refills: 2 | Status: SHIPPED | OUTPATIENT
Start: 2024-12-09

## 2025-01-06 RX ORDER — ALENDRONATE SODIUM 70 MG/1
TABLET ORAL
Qty: 4 TABLET | Refills: 4 | Status: SHIPPED | OUTPATIENT
Start: 2025-01-06

## 2025-01-08 ENCOUNTER — OFFICE VISIT (OUTPATIENT)
Dept: PRIMARY CARE CLINIC | Facility: CLINIC | Age: 80
End: 2025-01-08
Payer: MEDICARE

## 2025-01-08 VITALS
WEIGHT: 205 LBS | HEIGHT: 66 IN | OXYGEN SATURATION: 94 % | BODY MASS INDEX: 32.95 KG/M2 | SYSTOLIC BLOOD PRESSURE: 145 MMHG | DIASTOLIC BLOOD PRESSURE: 80 MMHG | TEMPERATURE: 98 F | RESPIRATION RATE: 16 BRPM | HEART RATE: 62 BPM

## 2025-01-08 DIAGNOSIS — M25.561 ACUTE PAIN OF RIGHT KNEE: Primary | ICD-10-CM

## 2025-01-08 DIAGNOSIS — M71.20 SYNOVIAL CYST OF POPLITEAL SPACE, UNSPECIFIED LATERALITY: ICD-10-CM

## 2025-01-08 DIAGNOSIS — M67.972: ICD-10-CM

## 2025-01-08 PROCEDURE — 99213 OFFICE O/P EST LOW 20 MIN: CPT | Mod: ,,, | Performed by: INTERNAL MEDICINE

## 2025-01-08 NOTE — PROGRESS NOTES
Latanya Taylor MD   [address] [facility] [place] [address]     Patient ID: [id number]     Chief Complaint: Left foot and right knee pain        HPI:     Kari Gomez is a 79 y.o. female here today for knee pain. It's her right knee this time. It was swollen last night and she used Voltaren Gel and today it is a little better. She felt it was going to give out so she skipped Mass today. She also notes a mass on the left foot. She has been praying that the tendon that was stretched would heal and now she sees this. No other complaints today.       Subjective:     ROS    Past Medical History:   Diagnosis Date    Allergic sinusitis     Arthritis     Combined hyperlipidemia     Disc degeneration, lumbar     Diverticula of colon     DJD (degenerative joint disease)     External hemorrhoid     Fracture of fibula 05/09/2022    right      Fracture, fibula     Headache, chronic migraine without aura     HTN (hypertension)     Hypothyroidism     Macular degeneration     Neuropathy     Vitamin D deficiency         Past Surgical History:   Procedure Laterality Date    BREAST BIOPSY      BUNIONECTOMY      CATARACT EXTRACTION, BILATERAL      EYE SURGERY      MACULAR DEGENERATION    FRACTURE SURGERY Left 2021    OPEN REDUCTION AND INTERNAL FIXATION (ORIF) OF FRACTURE OF DISTAL RADIUS  10/01/2021    OPEN REDUCTION AND INTERNAL FIXATION (ORIF) OF INJURY OF ANKLE Right 02/01/2019       Family History   Problem Relation Name Age of Onset    Hyperlipidemia Mother      Lung cancer Mother      COPD Mother      Stroke Father      Hyperlipidemia Father      Breast cancer Maternal Grandmother      Glaucoma Paternal Aunt      Kidney cancer Cousin          Social History     Socioeconomic History    Marital status: Single   Tobacco Use    Smoking status: Never    Smokeless tobacco: Never   Substance and Sexual Activity    Alcohol use: Never    Drug use: Never    Sexual activity: Not Currently     Social Drivers of Health     Financial Resource  "Strain: Low Risk  (7/15/2024)    Overall Financial Resource Strain (CARDIA)     Difficulty of Paying Living Expenses: Not hard at all   Food Insecurity: No Food Insecurity (7/15/2024)    Hunger Vital Sign     Worried About Running Out of Food in the Last Year: Never true     Ran Out of Food in the Last Year: Never true   Transportation Needs: No Transportation Needs (7/15/2024)    TRANSPORTATION NEEDS     Transportation : No   Physical Activity: Insufficiently Active (7/15/2024)    Exercise Vital Sign     Days of Exercise per Week: 2 days     Minutes of Exercise per Session: 10 min   Stress: No Stress Concern Present (7/15/2024)    [employer] - Occupational Stress Questionnaire     Feeling of Stress : Only a little   Housing Stability: Low Risk  (7/15/2024)    Housing Stability Vital Sign     Unable to Pay for Housing in the Last Year: No     Homeless in the Last Year: No       Review of patient's allergies indicates:   Allergen Reactions    Meperidine      Other reaction(s): doctor said pt is allergic  Other reaction(s): Not Indicated       No outpatient medications have been marked as taking for the 1/8/25 encounter (Office Visit) with Latanya Taylor MD.       Patient Care Team:  Latanya Taylor MD as PCP - General (Internal Medicine)  Marisol Griffin DPM as Consulting Physician (Podiatry)  João Cuenca MD as Consulting Physician (Otolaryngology)  Haroon Monreal MD as Consulting Physician (Orthopedic Surgery)  Keyon Hernandez MD as Consulting Physician (Ophthalmology)  Kelvin Matias MD as Consulting Physician (Otolaryngology)  Barron Mabry Jr., MD as Consulting Physician (Pain Medicine)       Objective:     BP (!) 145/80   Pulse 62   Temp 97.5 °F (36.4 °C) (Oral)   Resp 16   Ht 5' 6" (1.676 m)   Wt 93 kg (205 lb)   SpO2 (!) 94%   BMI 33.09 kg/m²     Physical Exam  Musculoskeletal:         General: Swelling present.      Comments: Cyst behind " the right knee, there is click with the right knee   Skin:     General: Skin is warm and dry.      Comments: Venous dilations               Assessment/Plan:     1. Acute pain of right knee  Comments:  Use the Voltaren Gel two or three times a day    2. Synovial cyst of popliteal space, unspecified laterality  Comments:  Doesn't want to consider surgery    3. Tendinopathy of left foot  Comments:  Will see the podiatrist and repeat xray there, it doesn't look like an issue that is new             No follow-ups on file. In addition to their scheduled follow up, the patient has also been instructed to follow up on as needed basis.     Signature:  Latanya Taylor MD  [facility]  [id number] [place] [address]

## 2025-01-09 ENCOUNTER — TELEPHONE (OUTPATIENT)
Dept: PRIMARY CARE CLINIC | Facility: CLINIC | Age: 80
End: 2025-01-09
Payer: MEDICARE

## 2025-01-09 NOTE — TELEPHONE ENCOUNTER
Spoke to patient states she was straining and constipated had bright red rectal bleed after bowel movement she no longer bleeding. Taking huff miralax and stool softener.

## 2025-01-09 NOTE — TELEPHONE ENCOUNTER
----- Message from Agnes sent at 1/9/2025 11:34 AM CST -----  Who Called: Kari Gomez    Caller is requesting assistance/information from provider's office.    Symptoms (please be specific): n/a   How long has patient had these symptoms:  n/a  List of preferred pharmacies on file (remove unneeded): [unfilled]  If different, enter pharmacy into here including location and phone number: n/a      Preferred Method of Contact: Phone Call  Patient's Preferred Phone Number on File: [phone]   Best Call Back Number, if different:  Additional Information: medical advise, pt called to discuss with Veronica in regards to current medical status, please advise, thanks

## 2025-02-24 RX ORDER — HYDROCHLOROTHIAZIDE 12.5 MG/1
12.5 TABLET ORAL EVERY OTHER DAY
Qty: 15 TABLET | Refills: 5 | Status: SHIPPED | OUTPATIENT
Start: 2025-02-24

## 2025-03-11 RX ORDER — LEVOTHYROXINE SODIUM 125 UG/1
125 TABLET ORAL
Qty: 30 TABLET | Refills: 2 | Status: SHIPPED | OUTPATIENT
Start: 2025-03-11

## 2025-03-26 ENCOUNTER — OFFICE VISIT (OUTPATIENT)
Dept: ORTHOPEDICS | Facility: CLINIC | Age: 80
End: 2025-03-26
Payer: MEDICARE

## 2025-03-26 VITALS
HEART RATE: 80 BPM | HEIGHT: 66 IN | WEIGHT: 205 LBS | BODY MASS INDEX: 32.95 KG/M2 | TEMPERATURE: 98 F | DIASTOLIC BLOOD PRESSURE: 73 MMHG | SYSTOLIC BLOOD PRESSURE: 148 MMHG

## 2025-03-26 DIAGNOSIS — M17.12 PRIMARY OSTEOARTHRITIS OF LEFT KNEE: ICD-10-CM

## 2025-03-26 DIAGNOSIS — M25.562 LEFT KNEE PAIN, UNSPECIFIED CHRONICITY: Primary | ICD-10-CM

## 2025-03-26 PROCEDURE — 20610 DRAIN/INJ JOINT/BURSA W/O US: CPT | Mod: LT,,, | Performed by: ORTHOPAEDIC SURGERY

## 2025-03-26 PROCEDURE — 99204 OFFICE O/P NEW MOD 45 MIN: CPT | Mod: 25,,, | Performed by: ORTHOPAEDIC SURGERY

## 2025-03-26 RX ADMIN — METHYLPREDNISOLONE ACETATE 80 MG: 80 INJECTION, SUSPENSION INTRA-ARTICULAR; INTRALESIONAL; INTRAMUSCULAR; SOFT TISSUE at 02:03

## 2025-03-26 RX ADMIN — LIDOCAINE HYDROCHLORIDE 2 MG: 20 INJECTION, SOLUTION INFILTRATION; PERINEURAL at 02:03

## 2025-03-26 NOTE — PROGRESS NOTES
"Subjective:    CC: Knee Pain (LINCOLN knee pain. Bakers Cyst on Right knee and pain and swelling in left.Pain in left knee started in 2 weeks ago. Patient has been using Votran gel and icing and heating knee.)       HPI:  Patient comes in today for her 1st visit.  Patient complains of left knee pain.  Patient states it has gotten worse over the last couple of weeks.  She has been having some pain when getting up from a seated position long standing and walking.  She denies any instability occasionally have some swelling.  She has tried rest medication, icing without relief.    ROS: Refer to HPI for pertinent ROS. All other 12 point systems negative.    Objective:  Vitals:    03/26/25 1435   BP: (!) 148/73   BP Location: Left arm   Patient Position: Sitting   Pulse: 80   Temp: 97.9 °F (36.6 °C)   Weight: 93 kg (205 lb 0.4 oz)   Height: 5' 6" (1.676 m)        Physical Exam:  The patient is well-nourished, well-developed and in no apparent distress, pleasant and cooperative. Examination of the left lower extremity compartments are soft and warm. Skin is intact. There are no signs or symptoms of DVT or infection. There is a small joint effusion. There is no erythema. Tender to palpation along the medial and lateral joint line , left knee range of motion is 5-100. The knee is stable to exam with varus and valgus stressing. Negative anterior and posterior drawer. Negative Lachman´s.  Negative Jairo's test. Patella grind is positive, Negative for apprehension. Neurovascularly intact distally.    Images:  X-rays three views left knee demonstrate some degenerative changes. Images Reviewed and discussed with patient.    Assessment:  1. Left knee pain, unspecified chronicity  - X-Ray Knee 3 View Left; Future    2. Primary osteoarthritis of left knee  - Large Joint Aspiration/Injection: L knee        Plan:  At this time we have discussed her physical exam and x-ray findings.  We have discussed her underlying arthritis.  We will " continue conservative treatment we have discussed a knee brace, she was given a pamphlet on knee range of motion strengthening exercises.  She tolerated the steroid injection very well through the anterolateral portal of the left knee.  I would like see her back in 6 weeks to see how she is progressing.    Follow UP: No follow-ups on file.    Large Joint Aspiration/Injection: L knee    Date/Time: 3/26/2025 2:30 PM    Performed by: Haroon Monreal MD  Authorized by: Haroon Monreal MD    Consent Done?:  Yes (Verbal)  Indications:  Pain  Site marked: the procedure site was marked    Prep: patient was prepped and draped in usual sterile fashion    Approach:  Anterolateral  Location:  Knee  Site:  L knee  Medications:  2 mg LIDOcaine HCL 20 mg/ml (2%) 20 mg/mL (2 %); 80 mg methylPREDNISolone acetate 80 mg/mL  Patient tolerance:  Patient tolerated the procedure well with no immediate complications

## 2025-03-26 NOTE — PROCEDURES
Large Joint Aspiration/Injection: L knee    Date/Time: 3/26/2025 2:30 PM    Performed by: Haroon Monreal MD  Authorized by: Haroon Monreal MD    Consent Done?:  Yes (Verbal)  Indications:  Pain  Site marked: the procedure site was marked    Prep: patient was prepped and draped in usual sterile fashion    Approach:  Anterolateral  Location:  Knee  Site:  L knee  Medications:  2 mg LIDOcaine HCL 20 mg/ml (2%) 20 mg/mL (2 %); 80 mg methylPREDNISolone acetate 80 mg/mL  Patient tolerance:  Patient tolerated the procedure well with no immediate complications

## 2025-03-30 RX ORDER — METHYLPREDNISOLONE ACETATE 80 MG/ML
80 INJECTION, SUSPENSION INTRA-ARTICULAR; INTRALESIONAL; INTRAMUSCULAR; SOFT TISSUE
Status: DISCONTINUED | OUTPATIENT
Start: 2025-03-26 | End: 2025-03-30 | Stop reason: HOSPADM

## 2025-03-30 RX ORDER — LIDOCAINE HYDROCHLORIDE 20 MG/ML
2 INJECTION, SOLUTION INFILTRATION; PERINEURAL
Status: DISCONTINUED | OUTPATIENT
Start: 2025-03-26 | End: 2025-03-30 | Stop reason: HOSPADM

## 2025-04-07 NOTE — PROGRESS NOTES
Latanya Taylor MD   [id number] [place] [address]     Patient ID: [id number]     Chief Complaint: 6 Months follow up for medication management (Complain of mouth and lip ulcer.) and Headache        HPI:     Kari Gomez is a 79 y.o. female here today for a follow up of HTN, DM, DJD, Macular Degeneration and Hypothyroidism. She has been getting ulcers on her mouth. She started on the upper lip and then it was on gum. It made her have trouble talking. She had to have injections in her left knee and then the left heel. She just can't walk that much without flaring them up. No other complaints today.       Subjective:     Review of Systems   Constitutional:         She has been thinking about where things in her house will go.    HENT:          Mouth hurts with the ulcers, dentist is sending a formulation but it's not ready yet.    Eyes:         She has to do shot in the left again at the end of this month   Cardiovascular:  Positive for chest pain.        Tight right chest pain. She goes Thursday to see Dr Kim. It lasted about 30 minutes. She prayed and held her crucifix and it did go eventually.    Musculoskeletal:  Positive for joint pain and myalgias. Negative for falls.   Neurological:  Positive for headaches.        Top of the head hurts, she had hit her head on cement with last fall.        Past Medical History:   Diagnosis Date    Allergic sinusitis     Arthritis     Combined hyperlipidemia     Disc degeneration, lumbar     Diverticula of colon     DJD (degenerative joint disease)     External hemorrhoid     Fracture of fibula 05/09/2022    right      Fracture, fibula     Headache, chronic migraine without aura     HTN (hypertension)     Hypothyroidism     Macular degeneration     Neuropathy     Vitamin D deficiency         Past Surgical History:   Procedure Laterality Date    BREAST BIOPSY      BUNIONECTOMY      CATARACT EXTRACTION, BILATERAL      EYE SURGERY      MACULAR DEGENERATION    FRACTURE  SURGERY Left     OPEN REDUCTION AND INTERNAL FIXATION (ORIF) OF FRACTURE OF DISTAL RADIUS  10/01/2021    OPEN REDUCTION AND INTERNAL FIXATION (ORIF) OF INJURY OF ANKLE Right 2019       Family History   Problem Relation Name Age of Onset    Hyperlipidemia Mother      Lung cancer Mother      COPD Mother      Stroke Father      Hyperlipidemia Father      Breast cancer Maternal Grandmother      Glaucoma Paternal Aunt      Kidney cancer Cousin          Social History[1]    Review of patient's allergies indicates:   Allergen Reactions    Meperidine      Other reaction(s): doctor said pt is allergic  Other reaction(s): Not Indicated   She is setting up to be cremated, she doesn't want a  just the mass at [employer].     Outpatient Medications Marked as Taking for the 25 encounter (Office Visit) with Latanya Taylor MD   Medication Sig Dispense Refill    acetaminophen (TYLENOL ARTHRITIS PAIN ORAL) Take by mouth daily as needed.      albuterol (PROAIR HFA) 90 mcg/actuation inhaler Inhale 2 puffs into the lungs every 6 (six) hours as needed for Wheezing. Covid associated bronchospasm 8 g 2    alendronate (FOSAMAX) 70 MG tablet TAKE ONE TABLET BY MOUTH ONCE A WEEK 4 tablet 4    ascorbic acid (VITAMIN C ORAL) Take by mouth.      benzonatate (TESSALON) 200 MG capsule TAKE ONE CAPSULE BY MOUTH THREE TIMES A DAY AS NEEDED FOR COUGH. MAY CAUSE DROWSINESS. 30 capsule 5    calcium carbonate/vitamin D3 (VITAMIN D-3 ORAL) Take 2,000 mg by mouth Daily.      CALCIUM-MAGNESIUM ORAL Take by mouth once daily at 6am.      cholecalciferol, vitamin D3, (VITAMIN D3) 50 mcg (2,000 unit) Cap capsule Take 4,000 Units by mouth once daily.      cyanocobalamin, vitamin B-12, (VITAMIN B-12 ORAL) Take by mouth once daily at 6am.      diltiaZEM (CARDIZEM CD) 240 MG 24 hr capsule TAKE ONE CAPSULE BY MOUTH DAILY 30 capsule 5    docusate sodium (COLACE) 100 MG capsule Take 100 mg by mouth 2 (two) times daily.      DULoxetine (CYMBALTA) 30  MG capsule TAKE ONE CAPSULE BY MOUTH DAILY FOR NEUROPATHY AND MOOD DO NOT CRUSH OR CHEW 30 capsule 11    fluticasone propionate (FLONASE) 50 mcg/actuation nasal spray 1 spray by Each Nostril route as needed.      gabapentin (NEURONTIN) 100 MG capsule TAKE ONE CAPSULE BY MOUTH THREE TIMES A DAY FOR NEUROPATHY (Patient taking differently: Take 100 mg by mouth 2 (two) times daily.) 90 capsule 2    hydroCHLOROthiazide 12.5 MG Tab TAKE ONE TABLET BY MOUTH EVERY OTHER DAY 15 tablet 5    ketorolac (TORADOL) 10 mg tablet TAKE ONE TABLET BY MOUTH EVERY FOUR HOURS AS NEEDED FOR PAIN. MAXIMUM OF FOUR TABLETS PER DAY FOR FIVE DAYS 20 tablet 1    levothyroxine (SYNTHROID) 125 MCG tablet TAKE ONE TABLET BY MOUTH ONCE DAILY 30 tablet 2    LYSINE ORAL Take by mouth 2 (two) times a day.      montelukast (SINGULAIR) 10 mg tablet TAKE ONE TABLET BY MOUTH DAILY 30 tablet 11    multivit/folic acid/vit K1 (ONE-A-DAY WOMEN'S 50 PLUS ORAL) Take by mouth 2 (two) times a day.      mv-mn/iron/folic acid/herb 190 (VITAMIN D3 COMPLETE ORAL) Vitamin D      polyethylene glycol 3350 (MIRALAX ORAL) Take by mouth.      RED YEAST RICE ORAL Take by mouth 2 (two) times a day.      sumatriptan (IMITREX) 100 MG tablet TAKE ONE TABLET BY MOUTH DAILY AS NEEDED FOR MIGRAINE HEADACHE (MAY REPEAT DOSE AFTER 2 HOURS UP TO A MAXIMUM  MG IN 24 HOURS) 9 tablet 3    vit A/vit C/vit E/zinc/copper (PRESERVISION AREDS ORAL) PreserVision AREDS         Patient Care Team:  Latanya Taylor MD as PCP - General (Internal Medicine)  Marisol Griffin DPM as Consulting Physician (Podiatry)  João Cuenca MD as Consulting Physician (Otolaryngology)  Haroon Monreal MD as Consulting Physician (Orthopedic Surgery)  Keyon Hernandez MD as Consulting Physician (Ophthalmology)  Kelvin Matias MD as Consulting Physician (Otolaryngology)  Barron Mabry Jr., MD as Consulting Physician (Pain Medicine)       Objective:     /74   Pulse 63   Temp 99  "°F (37.2 °C) (Oral)   Resp 16   Ht 5' 6" (1.676 m)   Wt 87.1 kg (192 lb)   SpO2 97%   BMI 30.99 kg/m²     Physical Exam  HENT:      Mouth/Throat:      Mouth: Mucous membranes are moist.      Comments: Ulcer on the gums  Cardiovascular:      Rate and Rhythm: Normal rate and regular rhythm.   Pulmonary:      Effort: Pulmonary effort is normal.      Breath sounds: Normal breath sounds.   Neurological:      Mental Status: She is oriented to person, place, and time. Mental status is at baseline.      Motor: Weakness present.      Gait: Gait abnormal.               Assessment/Plan:     1. Essential hypertension  Comments:  Better control today, continue HCTZ and diltiazem    2. Well controlled type 2 diabetes mellitus  Comments:  Lab today  Orders:  -     Hemoglobin A1C, POCT    3. Acquired hypothyroidism    4. Exudative age-related macular degeneration of left eye with active choroidal neovascularization  Comments:  Getting injection this month    5. Primary osteoarthritis involving multiple joints  Comments:  Had injections in foot and knee    6. Aphthous ulcer  Comments:  Can try vitamins but will send viscous Xylocaine to paint them and help her eat.    7. Type 2 diabetes mellitus with diabetic polyneuropathy, without long-term current use of insulin    Other orders  -     LIDOcaine viscous HCl 2% (XYLOCAINE) 2 % Soln; by Mucous Membrane route every 6 (six) hours. Pain ulcers with small amount  Dispense: 20 mL; Refill: 1             Follow up in about 3 months (around 7/8/2025) for DM. In addition to their scheduled follow up, the patient has also been instructed to follow up on as needed basis.     Signature:  Latanya Taylor MD  [facility]  [address] [address]         [1]   Social History  Socioeconomic History    Marital status: Single   Occupational History    Occupation: teacher   Tobacco Use    Smoking status: Never    Smokeless tobacco: Never   Substance and Sexual Activity    Alcohol " use: Never    Drug use: Never    Sexual activity: Not Currently     Social Drivers of Health     Financial Resource Strain: Low Risk  (7/15/2024)    Overall Financial Resource Strain (CARDIA)     Difficulty of Paying Living Expenses: Not hard at all   Food Insecurity: No Food Insecurity (7/15/2024)    Hunger Vital Sign     Worried About Running Out of Food in the Last Year: Never true     Ran Out of Food in the Last Year: Never true   Transportation Needs: No Transportation Needs (7/15/2024)    TRANSPORTATION NEEDS     Transportation : No   Physical Activity: Insufficiently Active (7/15/2024)    Exercise Vital Sign     Days of Exercise per Week: 2 days     Minutes of Exercise per Session: 10 min   Stress: No Stress Concern Present (7/15/2024)    [employer] [place] [employer] - Occupational Stress Questionnaire     Feeling of Stress : Only a little   Housing Stability: Unknown (7/15/2024)    Housing Stability Vital Sign     Unable to Pay for Housing in the Last Year: No     Homeless in the Last Year: No

## 2025-04-08 ENCOUNTER — RESULTS FOLLOW-UP (OUTPATIENT)
Dept: PRIMARY CARE CLINIC | Facility: CLINIC | Age: 80
End: 2025-04-08

## 2025-04-08 ENCOUNTER — OFFICE VISIT (OUTPATIENT)
Dept: PRIMARY CARE CLINIC | Facility: CLINIC | Age: 80
End: 2025-04-08
Payer: MEDICARE

## 2025-04-08 ENCOUNTER — TELEPHONE (OUTPATIENT)
Dept: PRIMARY CARE CLINIC | Facility: CLINIC | Age: 80
End: 2025-04-08

## 2025-04-08 VITALS
OXYGEN SATURATION: 97 % | WEIGHT: 192 LBS | HEIGHT: 66 IN | SYSTOLIC BLOOD PRESSURE: 129 MMHG | TEMPERATURE: 99 F | DIASTOLIC BLOOD PRESSURE: 74 MMHG | HEART RATE: 63 BPM | BODY MASS INDEX: 30.86 KG/M2 | RESPIRATION RATE: 16 BRPM

## 2025-04-08 DIAGNOSIS — E11.9 WELL CONTROLLED TYPE 2 DIABETES MELLITUS: ICD-10-CM

## 2025-04-08 DIAGNOSIS — E11.42 TYPE 2 DIABETES MELLITUS WITH DIABETIC POLYNEUROPATHY, WITHOUT LONG-TERM CURRENT USE OF INSULIN: ICD-10-CM

## 2025-04-08 DIAGNOSIS — H35.3221 EXUDATIVE AGE-RELATED MACULAR DEGENERATION OF LEFT EYE WITH ACTIVE CHOROIDAL NEOVASCULARIZATION: ICD-10-CM

## 2025-04-08 DIAGNOSIS — K12.0 APHTHOUS ULCER: ICD-10-CM

## 2025-04-08 DIAGNOSIS — M15.0 PRIMARY OSTEOARTHRITIS INVOLVING MULTIPLE JOINTS: ICD-10-CM

## 2025-04-08 DIAGNOSIS — I10 ESSENTIAL HYPERTENSION: Primary | ICD-10-CM

## 2025-04-08 DIAGNOSIS — E03.9 ACQUIRED HYPOTHYROIDISM: ICD-10-CM

## 2025-04-08 LAB — HBA1C MFR BLD: 5.5 %

## 2025-04-08 RX ORDER — LIDOCAINE HYDROCHLORIDE 20 MG/ML
SOLUTION OROPHARYNGEAL EVERY 6 HOURS
Qty: 20 ML | Refills: 1 | Status: SHIPPED | OUTPATIENT
Start: 2025-04-08

## 2025-04-08 NOTE — TELEPHONE ENCOUNTER
----- Message from Latanya Taylor MD sent at 4/8/2025  1:15 PM CDT -----  Excellent, she's in the normal range, she can actually have that apple fritter.   ----- Message -----  From: Maria Alejandra Fountain MA  Sent: 4/8/2025  11:00 AM CDT  To: Latanya Taylor MD

## 2025-04-14 DIAGNOSIS — G63 PERIPHERAL NEUROPATHY DUE TO METABOLIC DISORDER: ICD-10-CM

## 2025-04-14 DIAGNOSIS — E88.9 PERIPHERAL NEUROPATHY DUE TO METABOLIC DISORDER: ICD-10-CM

## 2025-04-14 DIAGNOSIS — E11.9 CONTROLLED TYPE 2 DIABETES MELLITUS WITHOUT COMPLICATION, WITHOUT LONG-TERM CURRENT USE OF INSULIN: ICD-10-CM

## 2025-04-14 RX ORDER — GABAPENTIN 100 MG/1
CAPSULE ORAL
Qty: 90 CAPSULE | Refills: 2 | Status: SHIPPED | OUTPATIENT
Start: 2025-04-14

## 2025-04-22 RX ORDER — DILTIAZEM HYDROCHLORIDE 240 MG/1
240 CAPSULE, COATED, EXTENDED RELEASE ORAL
Qty: 30 CAPSULE | Refills: 5 | Status: SHIPPED | OUTPATIENT
Start: 2025-04-22

## 2025-04-24 ENCOUNTER — TELEPHONE (OUTPATIENT)
Dept: PRIMARY CARE CLINIC | Facility: CLINIC | Age: 80
End: 2025-04-24
Payer: MEDICARE

## 2025-04-24 RX ORDER — LIDOCAINE HYDROCHLORIDE 20 MG/ML
SOLUTION OROPHARYNGEAL EVERY 6 HOURS
Qty: 20 ML | Refills: 1 | Status: SHIPPED | OUTPATIENT
Start: 2025-04-24

## 2025-04-24 NOTE — TELEPHONE ENCOUNTER
----- Message from Justine sent at 4/24/2025 10:34 AM CDT -----  Regarding: med refill  Who Called: Kari GomezRefill or New Rx:RefillRX Name and Strength:   LIDOcaine viscous HCl 2% (XYLOCAINE) 2 % SolnHow is the patient currently taking it? (ex. 1XDay):Is this a 30 day or 90 day RX:Local or Mail Order:List of preferred pharmacies on file (remove unneeded): [unfilled]If different Pharmacy is requested, enter Pharmacy information here including location and phone number:  Ordering Provider:Preferred Method of Contact: Phone CallPatient's Preferred Phone Number on File: [phone] Best Call Back Number, if different:Additional Information:

## 2025-04-29 ENCOUNTER — TELEPHONE (OUTPATIENT)
Dept: PRIMARY CARE CLINIC | Facility: CLINIC | Age: 80
End: 2025-04-29
Payer: MEDICARE

## 2025-04-29 ENCOUNTER — HOSPITAL ENCOUNTER (EMERGENCY)
Facility: HOSPITAL | Age: 80
Discharge: HOME | End: 2025-04-29
Attending: SPECIALIST
Payer: MEDICARE

## 2025-04-29 VITALS
HEIGHT: 66 IN | BODY MASS INDEX: 31.18 KG/M2 | SYSTOLIC BLOOD PRESSURE: 154 MMHG | DIASTOLIC BLOOD PRESSURE: 85 MMHG | OXYGEN SATURATION: 98 % | WEIGHT: 194 LBS | HEART RATE: 79 BPM | TEMPERATURE: 98 F | RESPIRATION RATE: 18 BRPM

## 2025-04-29 DIAGNOSIS — M25.571 RIGHT ANKLE PAIN: ICD-10-CM

## 2025-04-29 LAB
ALBUMIN SERPL-MCNC: 3.6 G/DL (ref 3.4–4.8)
ALBUMIN/GLOB SERPL: 1.2 RATIO (ref 1.1–2)
ALP SERPL-CCNC: 90 UNIT/L (ref 40–150)
ALT SERPL-CCNC: 25 UNIT/L (ref 0–55)
ANION GAP SERPL CALC-SCNC: 7 MEQ/L
AST SERPL-CCNC: 20 UNIT/L (ref 11–45)
BASOPHILS # BLD AUTO: 0.02 X10(3)/MCL
BASOPHILS NFR BLD AUTO: 0.2 %
BILIRUB SERPL-MCNC: 0.5 MG/DL
BUN SERPL-MCNC: 21.2 MG/DL (ref 9.8–20.1)
CALCIUM SERPL-MCNC: 10.3 MG/DL (ref 8.4–10.2)
CHLORIDE SERPL-SCNC: 107 MMOL/L (ref 98–107)
CO2 SERPL-SCNC: 26 MMOL/L (ref 23–31)
CREAT SERPL-MCNC: 0.68 MG/DL (ref 0.55–1.02)
CREAT/UREA NIT SERPL: 31
D DIMER PPP IA.FEU-MCNC: 0.32 UG/ML FEU (ref 0–0.5)
EOSINOPHIL # BLD AUTO: 0.01 X10(3)/MCL (ref 0–0.9)
EOSINOPHIL NFR BLD AUTO: 0.1 %
ERYTHROCYTE [DISTWIDTH] IN BLOOD BY AUTOMATED COUNT: 17.3 % (ref 11.5–17)
GFR SERPLBLD CREATININE-BSD FMLA CKD-EPI: >60 ML/MIN/1.73/M2
GLOBULIN SER-MCNC: 3 GM/DL (ref 2.4–3.5)
GLUCOSE SERPL-MCNC: 122 MG/DL (ref 82–115)
HCT VFR BLD AUTO: 31.5 % (ref 37–47)
HGB BLD-MCNC: 9.2 G/DL (ref 12–16)
IMM GRANULOCYTES # BLD AUTO: 0.04 X10(3)/MCL (ref 0–0.04)
IMM GRANULOCYTES NFR BLD AUTO: 0.4 %
LYMPHOCYTES # BLD AUTO: 3.27 X10(3)/MCL (ref 0.6–4.6)
LYMPHOCYTES NFR BLD AUTO: 29 %
MCH RBC QN AUTO: 22.5 PG (ref 27–31)
MCHC RBC AUTO-ENTMCNC: 29.2 G/DL (ref 33–36)
MCV RBC AUTO: 77.2 FL (ref 80–94)
MONOCYTES # BLD AUTO: 0.9 X10(3)/MCL (ref 0.1–1.3)
MONOCYTES NFR BLD AUTO: 8 %
NEUTROPHILS # BLD AUTO: 7.04 X10(3)/MCL (ref 2.1–9.2)
NEUTROPHILS NFR BLD AUTO: 62.3 %
PLATELET # BLD AUTO: 259 X10(3)/MCL (ref 130–400)
PMV BLD AUTO: 10 FL (ref 7.4–10.4)
POTASSIUM SERPL-SCNC: 4.2 MMOL/L (ref 3.5–5.1)
PROT SERPL-MCNC: 6.6 GM/DL (ref 5.8–7.6)
RBC # BLD AUTO: 4.08 X10(6)/MCL (ref 4.2–5.4)
SODIUM SERPL-SCNC: 140 MMOL/L (ref 136–145)
WBC # BLD AUTO: 11.28 X10(3)/MCL (ref 4.5–11.5)

## 2025-04-29 PROCEDURE — 85379 FIBRIN DEGRADATION QUANT: CPT | Performed by: SPECIALIST

## 2025-04-29 PROCEDURE — 85025 COMPLETE CBC W/AUTO DIFF WBC: CPT | Performed by: SPECIALIST

## 2025-04-29 PROCEDURE — 99284 EMERGENCY DEPT VISIT MOD MDM: CPT | Mod: 25

## 2025-04-29 PROCEDURE — 25000003 PHARM REV CODE 250: Performed by: SPECIALIST

## 2025-04-29 PROCEDURE — 80053 COMPREHEN METABOLIC PANEL: CPT | Performed by: SPECIALIST

## 2025-04-29 RX ORDER — KETOROLAC TROMETHAMINE 10 MG/1
10 TABLET, FILM COATED ORAL
Status: COMPLETED | OUTPATIENT
Start: 2025-04-29 | End: 2025-04-29

## 2025-04-29 RX ORDER — KETOROLAC TROMETHAMINE 10 MG/1
10 TABLET, FILM COATED ORAL EVERY 6 HOURS PRN
Qty: 15 TABLET | Refills: 0 | Status: SHIPPED | OUTPATIENT
Start: 2025-04-29 | End: 2025-05-04

## 2025-04-29 RX ADMIN — KETOROLAC TROMETHAMINE 10 MG: 10 TABLET, FILM COATED ORAL at 08:04

## 2025-04-29 NOTE — ED NOTES
Pt c/o rt Le pain worse w walking - the pain is just above ankle and anterior and medial- mary truama- mild dependant edema noted to bilat LE-skin pink/warm/bilat- sensation intact

## 2025-04-29 NOTE — TELEPHONE ENCOUNTER
----- Message from Maryana sent at 4/29/2025  3:00 PM CDT -----  Who Called: Kari GomezPatient is returning phone callWho Left Message for Patient:Does the patient know what this is regarding?:Preferred Method of Contact: Phone CallPatient's Preferred Phone Number on File: [phone] Best Call Back Number, if different:Additional Information: pt called to speak with nurse regarding  pain in left leg/calve difficulty in walking pls advise [phone] house phone [phone]

## 2025-04-29 NOTE — TELEPHONE ENCOUNTER
Spoke to patient complain pain in one area of her calf agree to go to ER.   Use jawbra 8 hrs  x week.

## 2025-04-30 ENCOUNTER — TELEPHONE (OUTPATIENT)
Dept: ORTHOPEDICS | Facility: CLINIC | Age: 80
End: 2025-04-30
Payer: MEDICARE

## 2025-04-30 NOTE — TELEPHONE ENCOUNTER
Patient called stating she is having pain and can not weight bear on her right ankle that she had surgery on. Patient went to ER yesterday 4/29/25. She states that the ER told her she has a loose screw from surgery in ankle that is causing symptoms. She would like to come in today to have it checked.

## 2025-04-30 NOTE — ED PROVIDER NOTES
Encounter Date: 4/29/2025       History     Chief Complaint   Patient presents with    Leg Pain     C/o pain to the R calf that started today.  Pt noted to have faint pedal pulses to the R foot, edema to R leg and + Bautista's sign.      79 year old female with tenderness over her right lower leg and ankle area that she states began today when walking; the pain hurts when she puts weight on her right ankle and that the pain is not really in the entire calf it is more lower ankle and anterior ankle areas; no chest pain, no shortness of breath; she admits to history of right ankle ORIF in 2019 following injury    The history is provided by the patient.     Review of patient's allergies indicates:   Allergen Reactions    Meperidine      Other reaction(s): doctor said pt is allergic  Other reaction(s): Not Indicated     Past Medical History:   Diagnosis Date    Allergic sinusitis     Arthritis     Combined hyperlipidemia     Disc degeneration, lumbar     Diverticula of colon     DJD (degenerative joint disease)     External hemorrhoid     Fracture of fibula 05/09/2022    right      Fracture, fibula     Headache, chronic migraine without aura     HTN (hypertension)     Hypothyroidism     Macular degeneration     Neuropathy     Vitamin D deficiency      Past Surgical History:   Procedure Laterality Date    BREAST BIOPSY      BUNIONECTOMY      CATARACT EXTRACTION, BILATERAL      EYE SURGERY      MACULAR DEGENERATION    FRACTURE SURGERY Left 2021    OPEN REDUCTION AND INTERNAL FIXATION (ORIF) OF FRACTURE OF DISTAL RADIUS  10/01/2021    OPEN REDUCTION AND INTERNAL FIXATION (ORIF) OF INJURY OF ANKLE Right 02/01/2019     Family History   Problem Relation Name Age of Onset    Hyperlipidemia Mother      Lung cancer Mother      COPD Mother      Stroke Father      Hyperlipidemia Father      Breast cancer Maternal Grandmother      Glaucoma Paternal Aunt      Kidney cancer Cousin       Social History[1]  Review of Systems    Constitutional: Negative.    HENT:  Positive for rhinorrhea.    Respiratory: Negative.     Cardiovascular: Negative.    Gastrointestinal: Negative.    Musculoskeletal:  Positive for arthralgias.   Skin: Negative.    Neurological:  Positive for numbness and headaches.   All other systems reviewed and are negative.      Physical Exam     Initial Vitals [04/29/25 1626]   BP Pulse Resp Temp SpO2   (!) 154/85 79 18 98.1 °F (36.7 °C) 98 %      MAP       --         Physical Exam    Nursing note and vitals reviewed.  Constitutional: She appears well-developed and well-nourished.   HENT:   Head: Normocephalic and atraumatic.   Eyes: EOM are normal. Pupils are equal, round, and reactive to light.   Neck: Neck supple.   Normal range of motion.  Cardiovascular:  Normal rate, regular rhythm, normal heart sounds and intact distal pulses.           Pulmonary/Chest: Breath sounds normal.   Abdominal: Abdomen is soft.   Musculoskeletal:         General: Normal range of motion.      Cervical back: Normal range of motion and neck supple.      Comments: Right lower leg tender to palpation above the ankle both anterior medial and lateral; Homans exam negative; trace edema pretibial pitting; distal radial pulse palpated, good capillary refill     Neurological: She is alert and oriented to person, place, and time. She has normal strength.   Skin: Skin is warm and dry.         ED Course   Procedures  Labs Reviewed   COMPREHENSIVE METABOLIC PANEL - Abnormal       Result Value    Sodium 140      Potassium 4.2      Chloride 107      CO2 26      Glucose 122 (*)     Blood Urea Nitrogen 21.2 (*)     Creatinine 0.68      Calcium 10.3 (*)     Protein Total 6.6      Albumin 3.6      Globulin 3.0      Albumin/Globulin Ratio 1.2      Bilirubin Total 0.5      ALP 90      ALT 25      AST 20      eGFR >60      Anion Gap 7.0      BUN/Creatinine Ratio 31     CBC WITH DIFFERENTIAL - Abnormal    WBC 11.28      RBC 4.08 (*)     Hgb 9.2 (*)     Hct 31.5 (*)      MCV 77.2 (*)     MCH 22.5 (*)     MCHC 29.2 (*)     RDW 17.3 (*)     Platelet 259      MPV 10.0      Neut % 62.3      Lymph % 29.0      Mono % 8.0      Eos % 0.1      Basophil % 0.2      Imm Grans % 0.4      Neut # 7.04      Lymph # 3.27      Mono # 0.90      Eos # 0.01      Baso # 0.02      Imm Gran # 0.04     D DIMER, QUANTITATIVE - Normal    D-Dimer 0.32     CBC W/ AUTO DIFFERENTIAL    Narrative:     The following orders were created for panel order CBC auto differential.  Procedure                               Abnormality         Status                     ---------                               -----------         ------                     CBC with Differential[[id number]]       Abnormal            Final result                 Please view results for these tests on the individual orders.          Imaging Results              X-Ray Ankle Complete Right (Final result)  Result time 04/29/25 20:38:25      Final result by Elieser Enriquez MD (04/29/25 20:38:25)                   Impression:      Good alignment of distal fibular fracture.      Electronically signed by: Elieser Enriquez MD  Date:    04/29/2025  Time:    20:38               Narrative:    EXAMINATION:  XR ANKLE COMPLETE 3 VIEW RIGHT    CLINICAL HISTORY:  Pain in right ankle and joints of right foot    TECHNIQUE:  AP, lateral, and oblique images of the right ankle were performed.    COMPARISON:  04/24/2019    FINDINGS:  Patient is post ORIF distal fibular fracture.  The alignment is within normal limits.                                       Medications   ketorolac tablet 10 mg (10 mg Oral Given 4/29/25 2011)     Medical Decision Making  79 year old female with tenderness over her right lower leg and ankle area that she states began today when walking; the pain hurts when she puts weight on her right ankle and that the pain is not really in the entire calf it is more lower ankle and anterior ankle areas; no chest pain, no shortness of breath; she  "admits to history of right ankle ORIF in 2019 following injury    DIFFERENTIAL DIAGNOSIS- arthritis, DVT, muscle strain, claudication    Amount and/or Complexity of Data Reviewed  Labs: ordered. Decision-making details documented in ED Course.  Radiology: ordered. Decision-making details documented in ED Course.    Risk  Prescription drug management.  Risk Details: Patient does not have elevated D-dimer and does not have a positive Homans test; does not appear to be DVT as the pain is closer to the ankle area; she had a past surgery in the area this could likely be result of that with arthritis; there appears to be a screw out of place on the x-ray; patient will be referred to Orthopedics and given Toradol 10 mg p.o.       Patient Vitals for the past 24 hrs:   BP Temp Temp src Pulse Resp SpO2 Height Weight   04/29/25 1626 (!) 154/85 98.1 °F (36.7 °C) Oral 79 18 98 % 5' 6.14" (1.68 m) 88 kg (194 lb)              The patient is resting comfortably and in no acute distress.  She states that her symptoms have improved after treatment in Emergency Department. I personally discussed her test results and treatment plan.  Gave strict ED precautions.  Specific conditions for return to the emergency department and importance of follow up with her primary care provided or the physician listed on the discharge instructions.  Patient voices understanding and agrees to the plan discussed. All patients' questions have been answered at this time.   She has remained hemodynamically stable throughout entire stay in ED and is stable for discharge home.                      Clinical Impression:  Final diagnoses:  [M25.571] Right ankle pain          ED Disposition Condition    Discharge Stable          ED Prescriptions       Medication Sig Dispense Start Date End Date Auth. Provider    ketorolac (TORADOL) 10 mg tablet Take 1 tablet (10 mg total) by mouth every 6 (six) hours as needed for Pain. 15 tablet 4/29/2025 5/4/2025 Tima Jacobo " MD [facility]          Follow-up Information       Follow up With Specialties Details Why Contact Info    Haroon Monreal MD Orthopedic Surgery In 1 day  [address] [place] [address]  [phone]                 [1]   Social History  Tobacco Use    Smoking status: Never    Smokeless tobacco: Never   Substance Use Topics    Alcohol use: Never    Drug use: Never        Tima Jacobo MD  04/30/25 1077

## 2025-04-30 NOTE — TELEPHONE ENCOUNTER
Patient called stating she is having pain and can not weight bear on her right ankle that she had surgery on. Patient went to ER yesterday 4/29/25. She states that the ER told her she has a loose screw from surgery in ankle that is causing symptoms. She would like to come in today to have it accessed.

## 2025-05-07 ENCOUNTER — OFFICE VISIT (OUTPATIENT)
Dept: ORTHOPEDICS | Facility: CLINIC | Age: 80
End: 2025-05-07
Payer: MEDICARE

## 2025-05-07 DIAGNOSIS — S82.61XS: Primary | ICD-10-CM

## 2025-05-07 NOTE — PROGRESS NOTES
Subjective:    CC: Pain of the Right Ankle (R ankle pain. Pt states she went to ER last week due to not being able to walk. Had US done and was told it's a loose screw. Pt states she got inj in her heel a few weeks ago and started having pain. No swelling today. No numbness. Ambulating with cane. No other concerns with it.)       HPI:  Patient returns today for repeat exam.  Patient states she had difficulty walking on her right ankle last week, denies any trauma or specific injury, was seen in the ER, was told had a screw that was loose.  She has had a previous ankle fracture fixation in the past.  She states it feels much better today.  She has no pain on the outside part of her ankle, minimal tenderness along the inside part of her ankle.  She denies any swelling or erythema, she does ambulate with a cane.  She does have a history of a podiatrist treatment for her left foot and ankle.    ROS: Refer to HPI for pertinent ROS. All other 12 point systems negative.    Objective:  There were no vitals filed for this visit.     Physical Exam:  Right lower extremity compartment soft and warm.  Skin is intact.  There was no signs or symptoms of DVT or infection.  She is nontender along the lateral malleolus.  Her incision is well healed has 35° of ankle motion she is stable to stressing.  She has minimal tenderness along the inside part of her ankle, neurovascular intact distally.    Images:  Outside x-rays right ankle were reviewed. Images Reviewed and discussed with patient.    Assessment:  1. Closed fracture of distal lateral malleolus of ankle, right, sequela        Plan:  At this time we discussed her physical exam and outside x-rays.  Has no hardware loosening.  We have discussed how her fixation has not changed, in the screw was placed due to her fracture pattern, and looks appropriate today.  I do not suspect her hardware is giving her difficulties.  We have discussed following up as needed, she will continue  low-impact activities, continuing with the cane as needed.  We have discussed appropriate shoe wear, ankle exercises, low-impact activities.    Follow UP: No follow-ups on file.

## 2025-06-03 ENCOUNTER — OFFICE VISIT (OUTPATIENT)
Dept: PRIMARY CARE CLINIC | Facility: CLINIC | Age: 80
End: 2025-06-03
Payer: MEDICARE

## 2025-06-03 VITALS
TEMPERATURE: 99 F | HEIGHT: 66 IN | HEART RATE: 71 BPM | BODY MASS INDEX: 32.95 KG/M2 | RESPIRATION RATE: 15 BRPM | OXYGEN SATURATION: 97 % | SYSTOLIC BLOOD PRESSURE: 133 MMHG | DIASTOLIC BLOOD PRESSURE: 77 MMHG | WEIGHT: 205 LBS

## 2025-06-03 DIAGNOSIS — S63.91XA SPRAIN OF RIGHT HAND, INITIAL ENCOUNTER: ICD-10-CM

## 2025-06-03 DIAGNOSIS — S46.911A STRAIN OF RIGHT SHOULDER, INITIAL ENCOUNTER: Primary | ICD-10-CM

## 2025-06-03 PROCEDURE — 99214 OFFICE O/P EST MOD 30 MIN: CPT | Mod: ,,, | Performed by: INTERNAL MEDICINE

## 2025-06-11 RX ORDER — LEVOTHYROXINE SODIUM 125 UG/1
125 TABLET ORAL
Qty: 30 TABLET | Refills: 2 | Status: SHIPPED | OUTPATIENT
Start: 2025-06-11

## 2025-07-01 RX ORDER — ALENDRONATE SODIUM 70 MG/1
70 TABLET ORAL WEEKLY
Qty: 4 TABLET | Refills: 11 | Status: SHIPPED | OUTPATIENT
Start: 2025-07-01

## 2025-07-14 ENCOUNTER — OFFICE VISIT (OUTPATIENT)
Dept: PRIMARY CARE CLINIC | Facility: CLINIC | Age: 80
End: 2025-07-14
Payer: MEDICARE

## 2025-07-14 VITALS
TEMPERATURE: 97 F | OXYGEN SATURATION: 95 % | SYSTOLIC BLOOD PRESSURE: 129 MMHG | BODY MASS INDEX: 32.95 KG/M2 | DIASTOLIC BLOOD PRESSURE: 75 MMHG | RESPIRATION RATE: 15 BRPM | WEIGHT: 205 LBS | HEART RATE: 82 BPM | HEIGHT: 66 IN

## 2025-07-14 DIAGNOSIS — I10 ESSENTIAL HYPERTENSION: ICD-10-CM

## 2025-07-14 DIAGNOSIS — R82.81 PYURIA: ICD-10-CM

## 2025-07-14 DIAGNOSIS — I83.93 ASYMPTOMATIC VARICOSE VEINS OF BOTH LOWER EXTREMITIES: ICD-10-CM

## 2025-07-14 DIAGNOSIS — Z00.00 MEDICARE ANNUAL WELLNESS VISIT, SUBSEQUENT: Primary | ICD-10-CM

## 2025-07-14 DIAGNOSIS — E11.42 TYPE 2 DIABETES MELLITUS WITH DIABETIC POLYNEUROPATHY, WITHOUT LONG-TERM CURRENT USE OF INSULIN: ICD-10-CM

## 2025-07-14 DIAGNOSIS — H35.3221 EXUDATIVE AGE-RELATED MACULAR DEGENERATION OF LEFT EYE WITH ACTIVE CHOROIDAL NEOVASCULARIZATION: ICD-10-CM

## 2025-07-14 DIAGNOSIS — E03.9 ACQUIRED HYPOTHYROIDISM: ICD-10-CM

## 2025-07-14 DIAGNOSIS — M15.0 PRIMARY OSTEOARTHRITIS INVOLVING MULTIPLE JOINTS: ICD-10-CM

## 2025-07-14 DIAGNOSIS — E78.2 MIXED HYPERLIPIDEMIA: ICD-10-CM

## 2025-07-14 PROCEDURE — 36415 COLL VENOUS BLD VENIPUNCTURE: CPT | Mod: ,,, | Performed by: INTERNAL MEDICINE

## 2025-07-14 PROCEDURE — G0439 PPPS, SUBSEQ VISIT: HCPCS | Mod: ,,, | Performed by: INTERNAL MEDICINE

## 2025-07-14 NOTE — PROGRESS NOTES
Patient ID: [id number]     Chief Complaint: Medicare AWV (Complain of low grade temp every morning.)      HPI:     Kari Gomez is a 80 y.o. female here today for a Medicare Wellness.       Opioid Screening: Patient medication list reviewed, patient is not taking prescription opioids. Patient is not using additional opioids than prescribed. Patient is at low risk of substance abuse based on this opioid use history.       Past Medical History:   Diagnosis Date    Allergic sinusitis     Arthritis     Combined hyperlipidemia     Disc degeneration, lumbar     Diverticula of colon     DJD (degenerative joint disease)     External hemorrhoid     Fracture of fibula 05/09/2022    right      Fracture, fibula     Headache, chronic migraine without aura     HTN (hypertension)     Hypothyroidism     Macular degeneration     Neuropathy     Vitamin D deficiency         Past Surgical History:   Procedure Laterality Date    BREAST BIOPSY      BUNIONECTOMY      CATARACT EXTRACTION, BILATERAL      EYE SURGERY      MACULAR DEGENERATION    FRACTURE SURGERY Left 2021    OPEN REDUCTION AND INTERNAL FIXATION (ORIF) OF FRACTURE OF DISTAL RADIUS  10/01/2021    OPEN REDUCTION AND INTERNAL FIXATION (ORIF) OF INJURY OF ANKLE Right 02/01/2019       Review of patient's allergies indicates:   Allergen Reactions    Meperidine      Other reaction(s): doctor said pt is allergic  Other reaction(s): Not Indicated       Outpatient Medications Marked as Taking for the 7/14/25 encounter (Office Visit) with Latanya Taylor MD   Medication Sig Dispense Refill    acetaminophen (TYLENOL ARTHRITIS PAIN ORAL) Take by mouth daily as needed.      albuterol (PROAIR HFA) 90 mcg/actuation inhaler Inhale 2 puffs into the lungs every 6 (six) hours as needed for Wheezing. Covid associated bronchospasm 8 g 2    alendronate (FOSAMAX) 70 MG tablet TAKE ONE TABLET BY MOUTH ONCE A WEEK 4 tablet 11    ascorbic acid (VITAMIN C ORAL) Take by mouth.      benzonatate  (TESSALON) 200 MG capsule TAKE ONE CAPSULE BY MOUTH THREE TIMES A DAY AS NEEDED FOR COUGH. MAY CAUSE DROWSINESS. 30 capsule 5    calcium carbonate/vitamin D3 (VITAMIN D-3 ORAL) Take 2,000 mg by mouth Daily.      CALCIUM-MAGNESIUM ORAL Take by mouth once daily at 6am.      cholecalciferol, vitamin D3, (VITAMIN D3) 50 mcg (2,000 unit) Cap capsule Take 4,000 Units by mouth once daily.      cyanocobalamin, vitamin B-12, (VITAMIN B-12 ORAL) Take by mouth once daily at 6am.      diltiaZEM (CARDIZEM CD) 240 MG 24 hr capsule TAKE ONE CAPSULE BY MOUTH DAILY 30 capsule 5    docusate sodium (COLACE) 100 MG capsule Take 100 mg by mouth 2 (two) times daily.      DULoxetine (CYMBALTA) 30 MG capsule TAKE ONE CAPSULE BY MOUTH DAILY FOR NEUROPATHY AND MOOD DO NOT CRUSH OR CHEW 30 capsule 11    fluticasone propionate (FLONASE) 50 mcg/actuation nasal spray 1 spray by Each Nostril route as needed.      gabapentin (NEURONTIN) 100 MG capsule TAKE ONE CAPSULE BY MOUTH THREE TIMES A DAY FOR NEUROPATHY 90 capsule 2    hydroCHLOROthiazide 12.5 MG Tab TAKE ONE TABLET BY MOUTH EVERY OTHER DAY 15 tablet 5    ketorolac (TORADOL) 10 mg tablet TAKE ONE TABLET BY MOUTH EVERY FOUR HOURS AS NEEDED FOR PAIN. MAXIMUM OF FOUR TABLETS PER DAY FOR FIVE DAYS 20 tablet 1    levothyroxine (SYNTHROID) 125 MCG tablet TAKE ONE TABLET BY MOUTH ONCE DAILY 30 tablet 2    LIDOcaine viscous HCl 2% (XYLOCAINE) 2 % Soln by Mucous Membrane route every 6 (six) hours. Pain ulcers with small amount 20 mL 1    LYSINE ORAL Take by mouth 2 (two) times a day.      montelukast (SINGULAIR) 10 mg tablet TAKE ONE TABLET BY MOUTH DAILY 30 tablet 11    multivit/folic acid/vit K1 (ONE-A-DAY WOMEN'S 50 PLUS ORAL) Take by mouth 2 (two) times a day.      mv-mn/iron/folic acid/herb 190 (VITAMIN D3 COMPLETE ORAL) Vitamin D      polyethylene glycol 3350 (MIRALAX ORAL) Take by mouth.      RED YEAST RICE ORAL Take by mouth 2 (two) times a day.      sumatriptan (IMITREX) 100 MG tablet TAKE  "ONE TABLET BY MOUTH DAILY AS NEEDED FOR MIGRAINE HEADACHE (MAY REPEAT DOSE AFTER 2 HOURS UP TO A MAXIMUM  MG IN 24 HOURS) 9 tablet 3    vit A/vit C/vit E/zinc/copper (PRESERVISION AREDS ORAL) PreserVision AREDS         Social History[1]     Family History   Problem Relation Name Age of Onset    Hyperlipidemia Mother      Lung cancer Mother      COPD Mother      Stroke Father      Hyperlipidemia Father      Breast cancer Maternal Grandmother      Glaucoma Paternal Aunt      Kidney cancer Cousin          Patient Care Team:  Latanya Taylor MD as PCP - General (Internal Medicine)  Marisol Griffin DPM as Consulting Physician (Podiatry)  João Cuenca MD as Consulting Physician (Otolaryngology)  Haroon Monreal MD as Consulting Physician (Orthopedic Surgery)  Keyon Hernandez MD as Consulting Physician (Ophthalmology)  Kelvin Matias MD as Consulting Physician (Otolaryngology)  Barron Mabry Jr., MD as Consulting Physician (Pain Medicine)       Subjective:     Review of Systems   Constitutional:  Negative for fever.   HENT: Negative.     Eyes:  Positive for blurred vision. Negative for discharge.   Respiratory: Negative.     Cardiovascular: Negative.    Gastrointestinal: Negative.    Genitourinary:  Positive for frequency.   Musculoskeletal:  Positive for joint pain and myalgias.        Swelling with left ankle and foot, she has both knees a Baker's Cyst.    Skin:  Positive for rash.        Left calf   Neurological:  Positive for sensory change and weakness.   Endo/Heme/Allergies:  Bruises/bleeds easily.   Psychiatric/Behavioral:          Feeling down with all her issues           Objective:     /75   Pulse 82   Temp 97.3 °F (36.3 °C) (Oral)   Resp 15   Ht 5' 6" (1.676 m)   Wt 93 kg (205 lb)   SpO2 95%   BMI 33.09 kg/m²     Physical Exam  Vitals reviewed.   Constitutional:       Appearance: She is ill-appearing.   HENT:      Head: Normocephalic and atraumatic.      Right Ear: " Tympanic membrane, ear canal and external ear normal.      Left Ear: Tympanic membrane and external ear normal.      Ears:      Comments: Still some scaling     Mouth/Throat:      Mouth: Mucous membranes are moist.      Pharynx: No oropharyngeal exudate or posterior oropharyngeal erythema.   Eyes:      General: No scleral icterus.     Extraocular Movements: Extraocular movements intact.      Conjunctiva/sclera: Conjunctivae normal.   Cardiovascular:      Rate and Rhythm: Normal rate and regular rhythm.      Pulses:           Dorsalis pedis pulses are 2+ on the right side and 2+ on the left side.        Posterior tibial pulses are 2+ on the right side and 2+ on the left side.   Pulmonary:      Effort: Pulmonary effort is normal.      Breath sounds: Normal breath sounds.   Abdominal:      General: Bowel sounds are normal.      Palpations: Abdomen is soft.      Tenderness: There is no abdominal tenderness. There is no guarding.   Musculoskeletal:         General: Swelling, tenderness and deformity present.      Left lower leg: Edema present.      Right foot: Decreased range of motion. Deformity present.      Left foot: Decreased range of motion. Deformity present.   Feet:      Right foot:      Protective Sensation: 10 sites tested.  10 sites sensed.      Skin integrity: Dry skin present.      Toenail Condition: Right toenails are normal.      Left foot:      Protective Sensation: 10 sites tested.        Skin integrity: Dry skin present.      Toenail Condition: Left toenails are normal.      Comments: Pes planus, there is deformity in the left ankle with bilateral effusions  Lymphadenopathy:      Cervical: No cervical adenopathy.   Skin:     General: Skin is warm and dry.      Comments: Marked varicosities, red area on the left lower calf anterior and medial   Neurological:      Mental Status: She is alert and oriented to person, place, and time. Mental status is at baseline.      Motor: Weakness present.      Gait:  Gait abnormal.   Psychiatric:      Comments: Mood is down, stressing with the wild cats at her house and loss of her dog.            A comprehensive HEALTH RISK ASSESSMENT was completed today. Results are summarized below:    There are NO EMOTIONAL/SOCIAL CONCERNS identified on today's screening for Social Isolation, Depression and Anxiety.    There are NO COGNITIVE FUNCTION CONCERNS identified on today's screening.  The following FUNCTIONAL AND/OR SAFETY CONCERNS were identified on today's screening for Physical Symptoms, Nutritional, Home Safety/Living Situation, Fall Risk, Activities of Daily Living, Independent Activities of Daily Living, Physical Activity,Timed Up and Go test and Whisper test::  *Patient reports NO ROUTINE EXERCISE. (On average, how many days per week do you engage in moderate to strenuous exercise (like a brisk walk)?: (!) 0)      The patient reports NO OPIOID PRESCRIPTIONS. This was confirmed through medication reconciliation.    The patient is NOT A TOBACCO USER.  The patient reports NO SIGNIFICANT ALCOHOL USE.     All Questions regarding food, transportation or housing were not answered today.  Lab Results   Component Value Date    HGBA1C 5.8 (H) 10/15/2024       Assessment/Plan:     1. Medicare annual wellness visit, subsequent    2. Type 2 diabetes mellitus with diabetic polyneuropathy, without long-term current use of insulin  Comments:  Excellent control with diet  Orders:  -     Comprehensive Metabolic Panel; Future; Expected date: 07/15/2025  -     Hemoglobin A1C; Future; Expected date: 07/15/2025  -     Lipid Panel; Future; Expected date: 07/15/2025  -     Microalbumin/Creatinine Ratio, Urine; Future; Expected date: 07/15/2025  -     Urinalysis, Reflex to Urine Culture; Future; Expected date: 07/15/2025  -     TSH; Future; Expected date: 07/15/2025  -     T4, Free; Future; Expected date: 07/15/2025    3. Acquired hypothyroidism  Comments:  TSH rising in October, recheck with T4 with  lab. Continue Synthroid 125 mcg daily.  Orders:  -     TSH; Future; Expected date: 07/15/2025  -     T4, Free; Future; Expected date: 07/15/2025    4. Essential hypertension  Comments:  Good control with Diltiazem and HCTZ    5. Mixed hyperlipidemia  Comments:  Lipid due  Orders:  -     Lipid Panel; Future; Expected date: 07/15/2025    6. Asymptomatic varicose veins of both lower extremities  Comments:  Unable to use the compression stockings they were too tight    7. Primary osteoarthritis involving multiple joints    8. Exudative age-related macular degeneration of left eye with active choroidal neovascularization  Comments:  On injections every 3 months    9. Pyuria  -     Urinalysis, Reflex to Urine Culture; Future; Expected date: 07/15/2025           Medicare Annual Wellness and Personalized Prevention Plan:   Fall Risk + Home Safety + Hearing Impairment + Depression Screen + Opioid and Substance Abuse Screening + Cognitive Impairment Screen + Health Risk Assessment all reviewed.     Health Maintenance Topics with due status: Not Due       Topic Last Completion Date    Influenza Vaccine 11/02/2020    DEXA Scan 10/25/2024    Diabetic Eye Exam 12/03/2024    Hemoglobin A1c 04/08/2025      The patient's Health Maintenance was reviewed and the following appears to be due at this time:   Health Maintenance Due   Topic Date Due    TETANUS VACCINE  Never done    Lipid Panel  07/10/2025    Diabetes Urine Screening  07/15/2025    Foot Exam  07/15/2025       Advance Care Planning   I attest to discussing Advance Care Planning with patient and/or family member.  Education was provided including the importance of the Health Care Power of , Advance Directives, and/or [place] documentation.  The patient expressed understanding to the importance of this information and discussion.   Advance Care Planning     Date: 07/14/2025    Living Will  During this visit, I engaged the patient  in the voluntary advance care planning  process.  The patient and I reviewed the role for advance directives and their purpose in directing future healthcare if the patient's unable to speak for him/herself.  At this point in time, the patient does have full decision-making capacity.  We discussed different extreme health states that she could experience, and reviewed what kind of medical care she would want in those situations.  The patient communicated that if she were comatose and had little chance of a meaningful recovery, she would not want machines/life-sustaining treatments used. In addition to the above preference, other important end-of-life issues for the patient include Quality of life and limited resources for help. The patient has completed a living will to reflect these preferences.  I spent a total of 3 minutes engaging the patient in this advance care planning discussion.                 Follow up in about 3 months (around 10/14/2025) for Recheck mood. In addition to their scheduled follow up, the patient has also been instructed to follow up on as needed basis.            [1]   Social History  Socioeconomic History    Marital status: Single   Occupational History    Occupation: teacher   Tobacco Use    Smoking status: Never    Smokeless tobacco: Never   Substance and Sexual Activity    Alcohol use: Never    Drug use: Never    Sexual activity: Not Currently     Social Drivers of Health     Financial Resource Strain: Low Risk  (7/14/2025)    Overall Financial Resource Strain (CARDIA)     Difficulty of Paying Living Expenses: Not hard at all   Food Insecurity: No Food Insecurity (7/14/2025)    Hunger Vital Sign     Worried About Running Out of Food in the Last Year: Never true     Ran Out of Food in the Last Year: Never true   Transportation Needs: No Transportation Needs (7/14/2025)    PRAPARE - Transportation     Lack of Transportation (Medical): No     Lack of Transportation (Non-Medical): No   Physical Activity: Inactive (7/14/2025)     Exercise Vital Sign     Days of Exercise per Week: 0 days     Minutes of Exercise per Session: 0 min   Stress: No Stress Concern Present (7/14/2025)    [employer] [place] [employer] - Occupational Stress Questionnaire     Feeling of Stress : Not at all   Housing Stability: High Risk (7/14/2025)    Housing Stability Vital Sign     Unable to Pay for Housing in the Last Year: No     Number of Times Moved in the Last Year: 0     Homeless in the Last Year: Yes

## 2025-07-14 NOTE — PATIENT INSTRUCTIONS
Federico Pierce,     If you are due for any health screening(s) below please notify me so we can arrange them to be ordered and scheduled. Most healthy patients at your age complete them, but you are free to accept or refuse.     If you can't do it, I'll definitely understand. If you can, I'd certainly appreciate it!    All of your core healthy metrics are met.    5-10 year preventative plan discussed.

## 2025-07-17 LAB
ALBUMIN SERPL-MCNC: 4 G/DL (ref 3.7–4.7)
ALP SERPL-CCNC: 88 IU/L (ref 44–121)
ALT SERPL-CCNC: 17 IU/L (ref 0–32)
APPEARANCE UR: CLEAR
AST SERPL-CCNC: 20 IU/L (ref 0–40)
BACTERIA #/AREA URNS HPF: ABNORMAL /[HPF]
BILIRUB SERPL-MCNC: 0.6 MG/DL (ref 0–1.2)
BILIRUB UR QL STRIP: NEGATIVE
BUN SERPL-MCNC: 17 MG/DL (ref 8–27)
BUN/CREAT SERPL: 29 (ref 12–28)
CALCIUM SERPL-MCNC: 9.6 MG/DL (ref 8.7–10.3)
CHLORIDE SERPL-SCNC: 104 MMOL/L (ref 96–106)
CHOLEST SERPL-MCNC: 192 MG/DL (ref 100–199)
CO2 SERPL-SCNC: 25 MMOL/L (ref 20–29)
COLOR UR: YELLOW
CREAT SERPL-MCNC: 0.59 MG/DL (ref 0.57–1)
CRYSTALS URNS MICRO: ABNORMAL
EPI CELLS #/AREA URNS HPF: ABNORMAL /HPF (ref 0–10)
GFR SERPLBLD CREATININE-BSD FMLA CKD-EPI: 90 ML/MIN/1.73
GLOBULIN SER CALC-MCNC: 1.8 G/DL (ref 1.5–4.5)
GLUCOSE SERPL-MCNC: 108 MG/DL (ref 70–99)
GLUCOSE UR QL STRIP: NEGATIVE
HBA1C MFR BLD: 6 % (ref 4.8–5.6)
HDLC SERPL-MCNC: 57 MG/DL
HGB UR QL STRIP: NEGATIVE
KETONES UR QL STRIP: NEGATIVE
LDLC SERPL CALC-MCNC: 118 MG/DL (ref 0–99)
LEUKOCYTE ESTERASE UR QL STRIP: NEGATIVE
MICRO URNS: ABNORMAL
MICRO URNS: ABNORMAL
MUCOUS THREADS URNS QL MICRO: PRESENT
NITRITE UR QL STRIP: NEGATIVE
PH UR STRIP: 8 [PH] (ref 5–7.5)
POTASSIUM SERPL-SCNC: 4 MMOL/L (ref 3.5–5.2)
PROT SERPL-MCNC: 5.8 G/DL (ref 6–8.5)
PROT UR QL STRIP: NEGATIVE
RBC #/AREA URNS HPF: ABNORMAL /HPF (ref 0–2)
SODIUM SERPL-SCNC: 141 MMOL/L (ref 134–144)
SP GR UR STRIP: 1.01 (ref 1–1.03)
T4 FREE SERPL-MCNC: 1.35 NG/DL (ref 0.82–1.77)
TRIGL SERPL-MCNC: 95 MG/DL (ref 0–149)
TSH SERPL DL<=0.005 MIU/L-ACNC: 1.17 UIU/ML (ref 0.45–4.5)
UNIDENT CRYS URNS QL MICRO: PRESENT
URINALYSIS REFLEX: ABNORMAL
UROBILINOGEN UR STRIP-MCNC: 1 MG/DL (ref 0.2–1)
VLDLC SERPL CALC-MCNC: 17 MG/DL (ref 5–40)
WBC #/AREA URNS HPF: ABNORMAL /HPF (ref 0–5)

## 2025-07-18 LAB
ALBUMIN/CREAT UR: NORMAL MG/G CREAT (ref 0–29)
CREAT UR-MCNC: 54.8 MG/DL
MICROALBUMIN UR-MCNC: <12 UG/ML

## 2025-07-22 ENCOUNTER — TELEPHONE (OUTPATIENT)
Dept: PRIMARY CARE CLINIC | Facility: CLINIC | Age: 80
End: 2025-07-22
Payer: MEDICARE

## 2025-07-22 DIAGNOSIS — Z79.899 ON STATIN THERAPY: ICD-10-CM

## 2025-07-22 DIAGNOSIS — E78.2 MIXED HYPERLIPIDEMIA: Primary | ICD-10-CM

## 2025-07-22 RX ORDER — ROSUVASTATIN CALCIUM 5 MG/1
5 TABLET, COATED ORAL DAILY
Qty: 30 TABLET | Refills: 5 | Status: SHIPPED | OUTPATIENT
Start: 2025-07-22

## 2025-07-22 NOTE — TELEPHONE ENCOUNTER
----- Message from Latanya Taylor MD sent at 7/20/2025  8:28 PM CDT -----  Urine without infection but shows crystals so stay hydrated to keep those from forming up to stones. The cholesterol is not bad but the bad cholesterol is higher than we like. Adding a statin would   reduce the risk of blockages that could cause stroke or heart attack if she's willing I'll send rosuvastatin. Sugars are up a little but still good control. Protein is lower so make sure to keep   enough in diet, peanut butter, and dried beans count just like meat. Thyroid is good.   ----- Message -----  From: Cyndi De La Cruz  Sent: 7/17/2025   7:09 PM CDT  To: Latanya Taylor MD

## 2025-07-22 NOTE — TELEPHONE ENCOUNTER
Copied from CRM #[id number]. Topic: General Inquiry - Return Call  >> Jul 21, 2025  2:58 PM Daniel wrote:  Who Called: Kari Gomez    Patient is returning phone call    Who Left Message for Patient:Veronica  Does the patient know what this is regarding?:test results      Preferred Method of Contact: Phone Call  Patient's Preferred Phone Number on File: [phone]   Best Call Back Number, if different:  Additional Information: Pt returning call about test results

## 2025-07-28 RX ORDER — MONTELUKAST SODIUM 10 MG/1
10 TABLET ORAL
Qty: 30 TABLET | Refills: 11 | Status: SHIPPED | OUTPATIENT
Start: 2025-07-28

## 2025-08-05 ENCOUNTER — TELEPHONE (OUTPATIENT)
Dept: PRIMARY CARE CLINIC | Facility: CLINIC | Age: 80
End: 2025-08-05
Payer: MEDICARE

## 2025-08-05 NOTE — TELEPHONE ENCOUNTER
Copied from CRM #[id number]. Topic: General Inquiry - Patient Advice  >> Aug 5, 2025 10:28 AM Maryana wrote:  Who Called: Kari Gomez    Caller is requesting assistance/information from provider's office.    Symptoms (please be specific): swollen ankle   How long has patient had these symptoms:    List of preferred pharmacies on file (remove unneeded): [unfilled]  If different, enter pharmacy into here including location and phone number:       Preferred Method of Contact: Phone Call  Patient's Preferred Phone Number on File: [phone]   Best Call Back Number, if different:  Additional Information: pt called to speak with nurse swollen ankle(left)very hard like a knot pls advise  [phone]